# Patient Record
Sex: FEMALE | Race: WHITE | NOT HISPANIC OR LATINO | Employment: FULL TIME | URBAN - METROPOLITAN AREA
[De-identification: names, ages, dates, MRNs, and addresses within clinical notes are randomized per-mention and may not be internally consistent; named-entity substitution may affect disease eponyms.]

---

## 2019-11-05 ENCOUNTER — OFFICE VISIT (OUTPATIENT)
Dept: PAIN MEDICINE | Facility: CLINIC | Age: 54
End: 2019-11-05

## 2019-11-05 ENCOUNTER — RESULTS ENCOUNTER (OUTPATIENT)
Dept: PAIN MEDICINE | Facility: HOSPITAL | Age: 54
End: 2019-11-05

## 2019-11-05 VITALS
WEIGHT: 149 LBS | RESPIRATION RATE: 16 BRPM | BODY MASS INDEX: 22.58 KG/M2 | OXYGEN SATURATION: 100 % | HEART RATE: 60 BPM | HEIGHT: 68 IN | DIASTOLIC BLOOD PRESSURE: 78 MMHG | TEMPERATURE: 98.3 F | SYSTOLIC BLOOD PRESSURE: 125 MMHG

## 2019-11-05 DIAGNOSIS — G43.719 INTRACTABLE CHRONIC MIGRAINE WITHOUT AURA AND WITHOUT STATUS MIGRAINOSUS: Primary | ICD-10-CM

## 2019-11-05 DIAGNOSIS — F19.90 CURRENT DRUG USE: ICD-10-CM

## 2019-11-05 DIAGNOSIS — M25.50 PAIN IN JOINT INVOLVING MULTIPLE SITES: ICD-10-CM

## 2019-11-05 DIAGNOSIS — F19.90 CURRENT DRUG USE: Primary | ICD-10-CM

## 2019-11-05 DIAGNOSIS — M32.9 SYSTEMIC LUPUS ERYTHEMATOSUS, UNSPECIFIED SLE TYPE, UNSPECIFIED ORGAN INVOLVEMENT STATUS (HCC): ICD-10-CM

## 2019-11-05 DIAGNOSIS — R51.9 CHRONIC DAILY HEADACHE: ICD-10-CM

## 2019-11-05 PROCEDURE — G0463 HOSPITAL OUTPT CLINIC VISIT: HCPCS | Performed by: PHYSICAL MEDICINE & REHABILITATION

## 2019-11-05 PROCEDURE — 99204 OFFICE O/P NEW MOD 45 MIN: CPT | Performed by: PHYSICAL MEDICINE & REHABILITATION

## 2019-11-05 RX ORDER — DIPHENHYDRAMINE HCL 25 MG
25 TABLET ORAL NIGHTLY PRN
COMMUNITY
Start: 2017-01-01

## 2019-11-05 RX ORDER — MECLIZINE HYDROCHLORIDE 25 MG/1
25 TABLET ORAL 3 TIMES DAILY PRN
COMMUNITY

## 2019-11-05 RX ORDER — NITROGLYCERIN 0.4 MG/1
TABLET SUBLINGUAL
COMMUNITY
Start: 2010-06-01 | End: 2022-10-28 | Stop reason: SDUPTHER

## 2019-11-05 RX ORDER — FOLIC ACID 0.8 MG
500 TABLET ORAL DAILY
COMMUNITY
Start: 2014-01-01

## 2019-11-05 RX ORDER — TOPIRAMATE 100 MG/1
100 TABLET, FILM COATED ORAL 2 TIMES DAILY
COMMUNITY
Start: 2019-09-18 | End: 2021-03-11 | Stop reason: SDUPTHER

## 2019-11-05 RX ORDER — OMEPRAZOLE 20 MG/1
20 CAPSULE, DELAYED RELEASE ORAL DAILY
COMMUNITY
Start: 2019-10-07 | End: 2021-04-22 | Stop reason: SDUPTHER

## 2019-11-05 RX ORDER — POTASSIUM CITRATE 15 MEQ/1
TABLET, EXTENDED RELEASE ORAL
COMMUNITY
Start: 2018-08-01 | End: 2021-02-17

## 2019-11-05 RX ORDER — SUMATRIPTAN 100 MG/1
100 TABLET, FILM COATED ORAL ONCE AS NEEDED
Qty: 9 TABLET | Refills: 2 | Status: SHIPPED | OUTPATIENT
Start: 2019-11-05 | End: 2020-01-21 | Stop reason: SDUPTHER

## 2019-11-05 RX ORDER — DILTIAZEM HYDROCHLORIDE 360 MG/1
360 CAPSULE, EXTENDED RELEASE ORAL DAILY
COMMUNITY
Start: 2019-10-21 | End: 2023-02-23

## 2019-11-05 RX ORDER — HYDROXYCHLOROQUINE SULFATE 200 MG/1
200 TABLET, FILM COATED ORAL 2 TIMES DAILY
COMMUNITY
Start: 2012-06-01

## 2019-11-05 RX ORDER — ESTRADIOL 0.5 MG/1
0.5 TABLET ORAL DAILY
COMMUNITY
Start: 2019-04-01 | End: 2021-08-20

## 2019-11-05 RX ORDER — LEVOTHYROXINE SODIUM 75 MCG
TABLET ORAL
COMMUNITY
Start: 2019-08-22 | End: 2020-12-31 | Stop reason: SDUPTHER

## 2019-11-05 RX ORDER — HYDROCODONE BITARTRATE AND ACETAMINOPHEN 10; 325 MG/1; MG/1
TABLET ORAL
COMMUNITY
Start: 2012-08-01 | End: 2019-11-05 | Stop reason: SDUPTHER

## 2019-11-05 RX ORDER — SUMATRIPTAN 100 MG/1
TABLET, FILM COATED ORAL
COMMUNITY
Start: 2001-01-01 | End: 2019-11-05 | Stop reason: SDUPTHER

## 2019-11-05 RX ORDER — ONDANSETRON 4 MG/1
TABLET, FILM COATED ORAL
COMMUNITY
Start: 2001-01-01 | End: 2020-07-21 | Stop reason: SDUPTHER

## 2019-11-05 RX ORDER — CYANOCOBALAMIN 1000 UG/ML
INJECTION, SOLUTION INTRAMUSCULAR; SUBCUTANEOUS
COMMUNITY
Start: 2019-10-21 | End: 2021-03-22

## 2019-11-05 RX ORDER — HYDROCODONE BITARTRATE AND ACETAMINOPHEN 10; 325 MG/1; MG/1
1 TABLET ORAL EVERY 6 HOURS PRN
Qty: 28 TABLET | Refills: 0 | Status: SHIPPED | OUTPATIENT
Start: 2019-11-05 | End: 2019-11-21 | Stop reason: SDUPTHER

## 2019-11-05 RX ORDER — CARVEDILOL 3.12 MG/1
3.12 TABLET ORAL 2 TIMES DAILY WITH MEALS
COMMUNITY
Start: 2010-10-10 | End: 2022-12-13

## 2019-11-05 NOTE — PROGRESS NOTES
Subjective   Tasneem Valentino is a 54 y.o. female.     Chronic daily headaches, also widespread joint pain with SLE, 10/10 at worst, 1/10 at best, always present, varies, aching, stabbing, worse with weather changes, interferes with ADLs, sleep, failed chiropractor, PT, massage, occipital neurotomy, headaches stim trial. MRI brain wnl. Prior notes reviewed, as above, was seeing Dr. Laguerre with Norco 10mg QID prn and Imitrex 100mg #9/month with some relief, also started Amovig 70mg, uses Zofran 4mg prn. No FH of substance abuse.         The following portions of the patient's history were reviewed and updated as appropriate: allergies, current medications, past family history, past medical history, past social history, past surgical history and problem list.    Review of Systems   Constitutional: Negative for chills, fatigue and fever.   HENT: Positive for hearing loss. Negative for trouble swallowing.    Eyes: Positive for visual disturbance.   Respiratory: Negative for shortness of breath.    Cardiovascular: Negative for chest pain.   Gastrointestinal: Positive for constipation. Negative for abdominal pain, diarrhea, nausea and vomiting.   Genitourinary: Negative for urinary incontinence.   Musculoskeletal: Negative for arthralgias, back pain, joint swelling, myalgias and neck pain.   Neurological: Positive for dizziness and headache. Negative for weakness and numbness.       Objective   Physical Exam   Constitutional: She is oriented to person, place, and time. She appears well-developed and well-nourished.   HENT:   Head: Normocephalic and atraumatic.   Eyes: EOM are normal. Pupils are equal, round, and reactive to light.   Neck: Normal range of motion.   Cardiovascular: Normal rate, regular rhythm and intact distal pulses.   Murmur heard.  Pulmonary/Chest: Breath sounds normal.   Abdominal: Soft. Bowel sounds are normal. She exhibits no distension. There is no tenderness.   Neurological: She is alert and oriented to  person, place, and time. She has normal strength and normal reflexes. She displays normal reflexes. No sensory deficit.   Psychiatric: She has a normal mood and affect. Her behavior is normal. Thought content normal.         Assessment/Plan   Tasneem was seen today for headache.    Diagnoses and all orders for this visit:    Intractable chronic migraine without aura and without status migrainosus    Chronic daily headache    Systemic lupus erythematosus, unspecified SLE type, unspecified organ involvement status (CMS/HCC)    Pain in joint involving multiple sites        Discussed risks and benefits of opioid treatment for chonic pain with patient, including expectations related to prescription requests, alternative modalities to opioids for managing pain, her treatment plan, risks of dependency and addiction, and safe storage practices for prescribed opioids, as well as proper and improper disposal of all medications.  Will obtain UDS today, pain contract today.  Inspect report reviewed, prior notes reviewed, consistent with patient's stated history.  Treatment plan will consist of continuing current medication as long as it remains effective and is necessary, while evaluating patient at each visit and determining if the medication can be lowered or discontinued, while also using nonopioid therapies to reduce reliance on opioids.  Cont Norco 10mg QID prn, Imitrex 100mg #9.  Failed procedures.  RTC 2-3 months for f/u.

## 2019-11-21 RX ORDER — HYDROCODONE BITARTRATE AND ACETAMINOPHEN 10; 325 MG/1; MG/1
1 TABLET ORAL EVERY 6 HOURS PRN
Qty: 120 TABLET | Refills: 0 | Status: SHIPPED | OUTPATIENT
Start: 2019-11-21 | End: 2020-01-21 | Stop reason: SDUPTHER

## 2019-11-21 RX ORDER — HYDROCODONE BITARTRATE AND ACETAMINOPHEN 10; 325 MG/1; MG/1
1 TABLET ORAL EVERY 6 HOURS PRN
Qty: 120 TABLET | Refills: 0 | Status: SHIPPED | OUTPATIENT
Start: 2019-11-21 | End: 2019-11-21 | Stop reason: SDUPTHER

## 2020-01-21 ENCOUNTER — OFFICE VISIT (OUTPATIENT)
Dept: PAIN MEDICINE | Facility: CLINIC | Age: 55
End: 2020-01-21

## 2020-01-21 VITALS
HEIGHT: 68 IN | HEART RATE: 56 BPM | OXYGEN SATURATION: 100 % | WEIGHT: 144 LBS | DIASTOLIC BLOOD PRESSURE: 85 MMHG | SYSTOLIC BLOOD PRESSURE: 127 MMHG | RESPIRATION RATE: 16 BRPM | TEMPERATURE: 98.3 F | BODY MASS INDEX: 21.82 KG/M2

## 2020-01-21 DIAGNOSIS — M25.50 PAIN IN JOINT INVOLVING MULTIPLE SITES: ICD-10-CM

## 2020-01-21 DIAGNOSIS — G43.719 INTRACTABLE CHRONIC MIGRAINE WITHOUT AURA AND WITHOUT STATUS MIGRAINOSUS: Primary | ICD-10-CM

## 2020-01-21 PROCEDURE — 99213 OFFICE O/P EST LOW 20 MIN: CPT | Performed by: PHYSICAL MEDICINE & REHABILITATION

## 2020-01-21 PROCEDURE — G0463 HOSPITAL OUTPT CLINIC VISIT: HCPCS | Performed by: PHYSICAL MEDICINE & REHABILITATION

## 2020-01-21 RX ORDER — HYDROCODONE BITARTRATE AND ACETAMINOPHEN 10; 325 MG/1; MG/1
1 TABLET ORAL EVERY 6 HOURS PRN
Qty: 120 TABLET | Refills: 0 | Status: SHIPPED | OUTPATIENT
Start: 2020-01-21 | End: 2020-01-21 | Stop reason: SDUPTHER

## 2020-01-21 RX ORDER — SUMATRIPTAN 100 MG/1
100 TABLET, FILM COATED ORAL ONCE AS NEEDED
Qty: 9 TABLET | Refills: 2 | Status: SHIPPED | OUTPATIENT
Start: 2020-01-21 | End: 2020-04-28 | Stop reason: SDUPTHER

## 2020-01-21 RX ORDER — HYDROCODONE BITARTRATE AND ACETAMINOPHEN 10; 325 MG/1; MG/1
1 TABLET ORAL EVERY 6 HOURS PRN
Qty: 120 TABLET | Refills: 0 | Status: SHIPPED | OUTPATIENT
Start: 2020-01-21 | End: 2020-04-28 | Stop reason: SDUPTHER

## 2020-01-21 NOTE — PROGRESS NOTES
Subjective   Tasneem Valentino is a 55 y.o. female.     Chronic daily headaches, also widespread joint pain with SLE, 10/10 at worst, 1/10 at best, always present, varies, aching, stabbing, worse with weather changes, interferes with ADLs, sleep, failed chiropractor, PT, massage, occipital neurotomy, headaches stim trial. MRI brain wnl. Prior notes reviewed, as above, was seeing Dr. Laguerre with Norco 10mg QID prn and Imitrex 100mg #9/month with some relief, also started Amovig 70mg, uses Zofran 4mg prn. No FH of substance abuse.       The following portions of the patient's history were reviewed and updated as appropriate: allergies, current medications, past family history, past medical history, past social history, past surgical history and problem list.    Review of Systems   Constitutional: Negative for chills, fatigue and fever.   HENT: Positive for hearing loss. Negative for trouble swallowing.    Eyes: Positive for visual disturbance.   Respiratory: Negative for shortness of breath.    Cardiovascular: Negative for chest pain.   Gastrointestinal: Positive for constipation. Negative for abdominal pain, diarrhea, nausea and vomiting.   Genitourinary: Negative for urinary incontinence.   Musculoskeletal: Negative for arthralgias, back pain, joint swelling, myalgias and neck pain.   Neurological: Positive for dizziness and headache. Negative for weakness and numbness.       Objective   Physical Exam   Constitutional: She is oriented to person, place, and time. She appears well-developed and well-nourished.   HENT:   Head: Normocephalic and atraumatic.   Eyes: Pupils are equal, round, and reactive to light. EOM are normal.   Neck: Normal range of motion.   Cardiovascular: Normal rate, regular rhythm and intact distal pulses.   Murmur heard.  Pulmonary/Chest: Breath sounds normal.   Abdominal: Soft. Bowel sounds are normal. She exhibits no distension. There is no tenderness.   Neurological: She is alert and oriented to  person, place, and time. She has normal strength and normal reflexes. She displays normal reflexes. No sensory deficit.   Psychiatric: She has a normal mood and affect. Her behavior is normal. Thought content normal.         Assessment/Plan   Tasneem was seen today for migraine.    Diagnoses and all orders for this visit:    Intractable chronic migraine without aura and without status migrainosus    Pain in joint involving multiple sites        UDS in order 11/5/19. Inspect reviewed, in order.  Treatment plan will consist of continuing current medication as long as it remains effective and is necessary, while evaluating patient at each visit and determining if the medication can be lowered or discontinued, while also using nonopioid therapies to reduce reliance on opioids.  Cont Norco 10mg QID prn, Imitrex 100mg #9.  Failed procedures.  RTC 3 months for f/u.

## 2020-04-28 ENCOUNTER — TELEMEDICINE (OUTPATIENT)
Dept: PAIN MEDICINE | Facility: CLINIC | Age: 55
End: 2020-04-28

## 2020-04-28 DIAGNOSIS — R51.9 CHRONIC DAILY HEADACHE: Primary | ICD-10-CM

## 2020-04-28 DIAGNOSIS — M25.50 PAIN IN JOINT INVOLVING MULTIPLE SITES: ICD-10-CM

## 2020-04-28 PROCEDURE — 99213 OFFICE O/P EST LOW 20 MIN: CPT | Performed by: PHYSICAL MEDICINE & REHABILITATION

## 2020-04-28 RX ORDER — SUMATRIPTAN 100 MG/1
100 TABLET, FILM COATED ORAL ONCE AS NEEDED
Qty: 9 TABLET | Refills: 2 | Status: SHIPPED | OUTPATIENT
Start: 2020-04-28 | End: 2020-07-21 | Stop reason: SDUPTHER

## 2020-04-28 RX ORDER — HYDROCODONE BITARTRATE AND ACETAMINOPHEN 10; 325 MG/1; MG/1
1 TABLET ORAL EVERY 6 HOURS PRN
Qty: 120 TABLET | Refills: 0 | Status: SHIPPED | OUTPATIENT
Start: 2020-04-28 | End: 2020-06-19 | Stop reason: SDUPTHER

## 2020-04-28 NOTE — PROGRESS NOTES
Subjective   Tasneem Valentino is a 55 y.o. female.     Chronic daily headaches, also widespread joint pain with SLE, 10/10 at worst, 1/10 at best, always present, varies, aching, stabbing, worse with weather changes, interferes with ADLs, sleep, failed chiropractor, PT, massage, occipital neurotomy, headaches stim trial. MRI brain wnl. Prior notes reviewed, as above, was seeing Dr. Laguerre with Norco 10mg QID prn and Imitrex 100mg #9/month with some relief, also started Amovig 70mg, uses Zofran 4mg prn. No FH of substance abuse.       The following portions of the patient's history were reviewed and updated as appropriate: allergies, current medications, past family history, past medical history, past social history, past surgical history and problem list.    Review of Systems   Constitutional: Negative for chills, fatigue and fever.   HENT: Positive for hearing loss. Negative for trouble swallowing.    Eyes: Positive for visual disturbance.   Respiratory: Negative for shortness of breath.    Cardiovascular: Negative for chest pain.   Gastrointestinal: Positive for constipation. Negative for abdominal pain, diarrhea, nausea and vomiting.   Genitourinary: Negative for urinary incontinence.   Musculoskeletal: Negative for arthralgias, back pain, joint swelling, myalgias and neck pain.   Neurological: Positive for dizziness and headache. Negative for weakness and numbness.       Objective   Physical Exam   Constitutional: She is oriented to person, place, and time. She appears well-developed and well-nourished.   Pulmonary/Chest: Effort normal.   Neurological: She is alert and oriented to person, place, and time. She has normal strength and normal reflexes.   Psychiatric: She has a normal mood and affect. Her behavior is normal. Thought content normal.         Assessment/Plan   Diagnoses and all orders for this visit:    Chronic daily headache    Pain in joint involving multiple sites        UDS in order 11/5/19. Inspect  reviewed, in order.  Treatment plan will consist of continuing current medication as long as it remains effective and is necessary, while evaluating patient at each visit and determining if the medication can be lowered or discontinued, while also using nonopioid therapies to reduce reliance on opioids.  Cont Norco 10mg QID prn, Imitrex 100mg #9.  Failed procedures.  RTC 3 months for f/u. Video visit.

## 2020-06-19 DIAGNOSIS — M25.50 PAIN IN JOINT INVOLVING MULTIPLE SITES: ICD-10-CM

## 2020-06-19 RX ORDER — HYDROCODONE BITARTRATE AND ACETAMINOPHEN 10; 325 MG/1; MG/1
1 TABLET ORAL EVERY 6 HOURS PRN
Qty: 120 TABLET | Refills: 0 | Status: SHIPPED | OUTPATIENT
Start: 2020-06-19 | End: 2020-06-25 | Stop reason: SDUPTHER

## 2020-06-22 PROBLEM — G43.909 MIGRAINE: Status: ACTIVE | Noted: 2020-06-22

## 2020-06-22 PROBLEM — I10 BENIGN ESSENTIAL HYPERTENSION: Status: ACTIVE | Noted: 2017-07-12

## 2020-06-22 PROBLEM — E03.9 HYPOTHYROIDISM: Status: ACTIVE | Noted: 2017-07-12

## 2020-06-22 PROBLEM — F41.9 ANXIETY: Status: ACTIVE | Noted: 2020-06-22

## 2020-06-22 PROBLEM — J02.9 VIRAL PHARYNGITIS: Status: ACTIVE | Noted: 2020-06-22

## 2020-06-22 PROBLEM — F32.A DEPRESSIVE DISORDER: Status: ACTIVE | Noted: 2017-07-12

## 2020-06-22 PROBLEM — I35.1 AORTIC INSUFFICIENCY: Status: ACTIVE | Noted: 2019-05-30

## 2020-06-22 PROBLEM — D64.9 ANEMIA: Status: ACTIVE | Noted: 2020-06-22

## 2020-06-23 ENCOUNTER — OFFICE VISIT (OUTPATIENT)
Dept: FAMILY MEDICINE CLINIC | Facility: CLINIC | Age: 55
End: 2020-06-23

## 2020-06-23 VITALS
OXYGEN SATURATION: 99 % | TEMPERATURE: 99.3 F | DIASTOLIC BLOOD PRESSURE: 71 MMHG | SYSTOLIC BLOOD PRESSURE: 108 MMHG | HEART RATE: 56 BPM | WEIGHT: 136 LBS | BODY MASS INDEX: 20.61 KG/M2 | HEIGHT: 68 IN

## 2020-06-23 DIAGNOSIS — R29.898 WEAKNESS OF RIGHT UPPER EXTREMITY: ICD-10-CM

## 2020-06-23 DIAGNOSIS — R20.0 NUMBNESS: ICD-10-CM

## 2020-06-23 DIAGNOSIS — E55.9 VITAMIN D DEFICIENCY: ICD-10-CM

## 2020-06-23 DIAGNOSIS — I10 BENIGN ESSENTIAL HYPERTENSION: Primary | ICD-10-CM

## 2020-06-23 DIAGNOSIS — D51.9 ANEMIA DUE TO VITAMIN B12 DEFICIENCY, UNSPECIFIED B12 DEFICIENCY TYPE: ICD-10-CM

## 2020-06-23 DIAGNOSIS — E03.9 ACQUIRED HYPOTHYROIDISM: ICD-10-CM

## 2020-06-23 DIAGNOSIS — I10 ESSENTIAL HYPERTENSION: ICD-10-CM

## 2020-06-23 DIAGNOSIS — E53.8 VITAMIN B 12 DEFICIENCY: ICD-10-CM

## 2020-06-23 DIAGNOSIS — M79.601 PAIN OF RIGHT UPPER EXTREMITY: ICD-10-CM

## 2020-06-23 DIAGNOSIS — M25.521 RIGHT ELBOW PAIN: ICD-10-CM

## 2020-06-23 DIAGNOSIS — M32.9 SYSTEMIC LUPUS ERYTHEMATOSUS, UNSPECIFIED SLE TYPE, UNSPECIFIED ORGAN INVOLVEMENT STATUS (HCC): ICD-10-CM

## 2020-06-23 DIAGNOSIS — R22.32 NODULE OF SKIN OF LEFT UPPER EXTREMITY: ICD-10-CM

## 2020-06-23 PROBLEM — Z87.442 HISTORY OF RENAL STONE: Status: ACTIVE | Noted: 2020-06-23

## 2020-06-23 PROBLEM — Z86.19 HISTORY OF LYME DISEASE: Status: ACTIVE | Noted: 2020-06-23

## 2020-06-23 PROBLEM — N18.2 KIDNEY DISEASE, CHRONIC, STAGE II (GFR 60-89 ML/MIN): Status: ACTIVE | Noted: 2020-06-23

## 2020-06-23 PROCEDURE — 99204 OFFICE O/P NEW MOD 45 MIN: CPT | Performed by: FAMILY MEDICINE

## 2020-06-23 RX ORDER — UBIDECARENONE 100 MG
100 CAPSULE ORAL DAILY
COMMUNITY
End: 2022-04-08

## 2020-06-23 NOTE — PROGRESS NOTES
Chief Complaint   Patient presents with   • weakness   • right arm pain       Subjective     Tasneem Valentino is a 55 y.o. female.  Patient presents as a new patient to myself and this practice to establish care.  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   Biggest concern today is weakness in her right arm that started on May 22 as well as a knots on right arm that has gotten somewhat painful over the last couple of years and 2 knots on left arm that have just recently come up.  Patient has history of lupus, does see rheumatology at [facility] in [place].  Did have some labs a couple of months ago but do not know any specifics on what was ordered.    Patient has history of stage II to stage III chronic renal insufficiency, followed by Dr. Good since August 2018.  Per his recommendation she is to avoid ACE inhibitors and arms and diuretics if possible.  January he did diagnosed her with vitamin D deficiency and started her on cholecalciferol 50,000 units monthly.  She states she did have some tingling in her left fingertips but have improved since starting the vitamin D.  Additionally she has a history of kidney stones and he started her on potassium citrate to help reduce stone formation.  She states on June 18, she had 1 of these tablets lodged in her throat and had to go to [facility] to have an EGD to remove the tablet.  She denies ongoing dysphasia or foreign body sensation but does report she has had a little more cough since this time.  She is not currently taking the potassium but does have a call in to Dr. Good for direction.    Patient has a history of migraine headaches, she had seen a neurologist in [place] but not for several years.  She reports having an occipital neurotomy 10 years ago.  She was started on Ajovi by her previous primary care last year, she has continue Topamax.  And is also seeing pain management, currently   Supriya, and prescribed hydrocodone 4 times daily.    Previously she has been diagnosed with anemia and has been on B12 injections for a few years.    The following portions of the patient's history were reviewed and updated as appropriate: allergies, current medications, past family history, past medical history, past social history, past surgical history and problem list.    Current Outpatient Medications on File Prior to Visit   Medication Sig   • carvedilol (COREG) 3.125 MG tablet carvedilol 3.125 mg tablet   • Cetirizine HCl 10 MG capsule    • coenzyme Q10 100 MG capsule Take 100 mg by mouth Daily.   • cyanocobalamin 1000 MCG/ML injection    • dilTIAZem CD (CARDIZEM CD) 360 MG 24 hr capsule    • diphenhydrAMINE (BENADRYL ALLERGY) 25 MG tablet    • Erenumab-aooe (AIMOVIG) 70 MG/ML prefilled syringe    • estradiol (ESTRACE) 0.5 MG tablet estradiol 0.5 mg tablet   • HYDROcodone-acetaminophen (NORCO)  MG per tablet Take 1 tablet by mouth Every 6 (Six) Hours As Needed for Moderate Pain  for up to 30 days.   • hydroxychloroquine (PLAQUENIL) 200 MG tablet hydroxychloroquine 200 mg tablet   • Magnesium 500 MG capsule    • meclizine (ANTIVERT) 25 MG tablet Every 8 (Eight) Hours.   • Melatonin 5 MG capsule Take  by mouth.   • nitroglycerin (NITROSTAT) 0.4 MG SL tablet Nitrostat 0.4 mg sublingual tablet   TAKE AS DIRECTED   • omeprazole (priLOSEC) 20 MG capsule    • ondansetron (ZOFRAN) 4 MG tablet ondansetron HCl 4 mg tablet   • progesterone (PROMETRIUM) 100 MG capsule progesterone micronized 100 mg capsule   • sertraline (ZOLOFT) 50 MG tablet sertraline 50 mg tablet   TAKE 1/2 TABLET BY MOUTH IN MORNING AND ONE TABLET BY MOUTH AT BEDTIME.   • SUMAtriptan (IMITREX) 100 MG tablet Take 1 tablet by mouth 1 (One) Time As Needed for Migraine for up to 1 dose. At onset of headache. Repeat one time in 2 hours if needed.   • SYNTHROID 75 MCG tablet    • topiramate (TOPAMAX) 100 MG tablet    • Potassium Citrate ER 15 MEQ  "(1620 MG) tablet controlled-release potassium citrate ER 15 mEq (1,620 mg) tablet,extended release     No current facility-administered medications on file prior to visit.      There are no discontinued medications.    Review of Systems   Constitutional: Negative for appetite change, fatigue, fever and unexpected weight loss.   HENT: Positive for hearing loss ( Bilateral hearing aids) and tinnitus. Negative for congestion.    Eyes: Negative for visual disturbance.   Respiratory: Negative for cough, shortness of breath and wheezing.    Cardiovascular: Positive for chest pain. Negative for palpitations and leg swelling.   Gastrointestinal: Negative for abdominal pain, constipation, diarrhea, nausea, vomiting and indigestion.   Musculoskeletal: Positive for arthralgias. Negative for neck pain ( Mild stiffness on the left occasionally).   Skin: Positive for skin lesions. Negative for rash.   Neurological: Positive for weakness, numbness and headache.   Psychiatric/Behavioral: Negative for sleep disturbance and depressed mood.    cervical spine x-ray does show some mild anterior listhesis at C4 on C5 and C5 on C6, as well as some mild arthritic type bone spur formation.  Right elbow x-ray is negative  Objective   Vitals:    06/23/20 1451   BP: 108/71   BP Location: Left arm   Patient Position: Sitting   Cuff Size: Adult   Pulse: 56   Temp: 99.3 °F (37.4 °C)   TempSrc: Infrared   SpO2: 99%   Weight: 61.7 kg (136 lb)   Height: 172.7 cm (68\")      Body mass index is 20.68 kg/m².    Physical Exam   Constitutional: She is oriented to person, place, and time. She appears well-developed and well-nourished. No distress.   Eyes: Pupils are equal, round, and reactive to light. Conjunctivae and EOM are normal.   Neck: Normal range of motion.   Cardiovascular: Normal rate and regular rhythm.   No murmur heard.  Pulmonary/Chest: Effort normal. She has no wheezes.   Musculoskeletal: Normal range of motion. She exhibits no edema. "   Normal range of motion of both upper extremities and fingers wrists elbows and shoulders  Patient has minimally decreased 4+ out of 5 grasp strength, finger strength, wrist strength and upper arm strength on the right compared with 5 out of 5 on the left.  Negative Tinel's, negative Phalen's negative Finkelstein's bilaterally.   Neurological: She is alert and oriented to person, place, and time.   Skin: Skin is warm and dry.   There is a subcentimeter mobile firm minimally tender round nodule just above the right posterior elbow.  There are 2 smaller nontender lesions 1 in the left forearm and one in the left upper arm.  These are most consistent with lipomas.   Psychiatric: She has a normal mood and affect.   Nursing note and vitals reviewed.          No results found for: HGBA1C     Procedures     Assessment/Plan   Diagnoses and all orders for this visit:    1. Benign essential hypertension (Primary)  -     Comprehensive Metabolic Panel  -     CBC & Differential    2. Essential hypertension    3. Systemic lupus erythematosus, unspecified SLE type, unspecified organ involvement status (CMS/[facility], located within [facility])  -     Sedimentation Rate    4. Acquired hypothyroidism  -     TSH  -     T4, free    5. Vitamin D deficiency  -     cholecalciferol (VITAMIN D3) 1.25 MG (16468 UT) capsule; Monthly (prescribed by Dr Rojo)  Dispense: 12 capsule; Refill: 3  -     Vitamin D 25 Hydroxy    6. Numbness  -     XR Elbow 2 View Right (In Office)    7. Weakness of right upper extremity  -     XR Elbow 2 View Right (In Office)  -     Ambulatory Referral to Neurology  -     XR Spine Cervical 2 or 3 View    8. Nodule of skin of left upper extremity  -     XR Elbow 2 View Right (In Office)    9. Vitamin B 12 deficiency    10. Anemia due to vitamin B12 deficiency, unspecified B12 deficiency type  -     Vitamin B12    11. Right elbow pain  -     XR Elbow 2 View Right (In Office)    12. Pain of right upper extremity  -     Ambulatory Referral to  Neurology  -     XR Spine Cervical 2 or 3 View    Right arm pain and weakness, and subcutaneous nodules with history of lupus, vitamin D and B12 deficiencies.  Labs as ordered, cervical spondylolisthesis is not likely of clinical significance but if no other cause of arm weakness and pain is identified would recommend cervical MRI to make sure there is not any nerve impingement.  referral to neurology for further evaluation including nerve conduction testing      There are no discontinued medications.       Return if symptoms worsen or fail to improve.        AVS given

## 2020-06-24 LAB
25(OH)D3+25(OH)D2 SERPL-MCNC: 21.9 NG/ML (ref 30–100)
ALBUMIN SERPL-MCNC: 4.4 G/DL (ref 3.8–4.9)
ALBUMIN/GLOB SERPL: 1.7 {RATIO} (ref 1.2–2.2)
ALP SERPL-CCNC: 75 IU/L (ref 39–117)
ALT SERPL-CCNC: 9 IU/L (ref 0–32)
AST SERPL-CCNC: 15 IU/L (ref 0–40)
BASOPHILS # BLD AUTO: 0 X10E3/UL (ref 0–0.2)
BASOPHILS NFR BLD AUTO: 1 %
BILIRUB SERPL-MCNC: <0.2 MG/DL (ref 0–1.2)
BUN SERPL-MCNC: 15 MG/DL (ref 6–24)
BUN/CREAT SERPL: 13 (ref 9–23)
CALCIUM SERPL-MCNC: 9.2 MG/DL (ref 8.7–10.2)
CHLORIDE SERPL-SCNC: 108 MMOL/L (ref 96–106)
CO2 SERPL-SCNC: 23 MMOL/L (ref 20–29)
CREAT SERPL-MCNC: 1.16 MG/DL (ref 0.57–1)
EOSINOPHIL # BLD AUTO: 0.1 X10E3/UL (ref 0–0.4)
EOSINOPHIL NFR BLD AUTO: 2 %
ERYTHROCYTE [DISTWIDTH] IN BLOOD BY AUTOMATED COUNT: 14.2 % (ref 11.7–15.4)
ERYTHROCYTE [SEDIMENTATION RATE] IN BLOOD BY WESTERGREN METHOD: 2 MM/HR (ref 0–40)
GLOBULIN SER CALC-MCNC: 2.6 G/DL (ref 1.5–4.5)
GLUCOSE SERPL-MCNC: 98 MG/DL (ref 65–99)
HCT VFR BLD AUTO: 35.9 % (ref 34–46.6)
HGB BLD-MCNC: 11.5 G/DL (ref 11.1–15.9)
IMM GRANULOCYTES # BLD AUTO: 0 X10E3/UL (ref 0–0.1)
IMM GRANULOCYTES NFR BLD AUTO: 1 %
LYMPHOCYTES # BLD AUTO: 1.3 X10E3/UL (ref 0.7–3.1)
LYMPHOCYTES NFR BLD AUTO: 25 %
MCH RBC QN AUTO: 29.4 PG (ref 26.6–33)
MCHC RBC AUTO-ENTMCNC: 32 G/DL (ref 31.5–35.7)
MCV RBC AUTO: 92 FL (ref 79–97)
MONOCYTES # BLD AUTO: 0.5 X10E3/UL (ref 0.1–0.9)
MONOCYTES NFR BLD AUTO: 10 %
NEUTROPHILS # BLD AUTO: 3.2 X10E3/UL (ref 1.4–7)
NEUTROPHILS NFR BLD AUTO: 61 %
PLATELET # BLD AUTO: 192 X10E3/UL (ref 150–450)
POTASSIUM SERPL-SCNC: 4.2 MMOL/L (ref 3.5–5.2)
PROT SERPL-MCNC: 7 G/DL (ref 6–8.5)
RBC # BLD AUTO: 3.91 X10E6/UL (ref 3.77–5.28)
SODIUM SERPL-SCNC: 141 MMOL/L (ref 134–144)
T4 FREE SERPL-MCNC: 1.19 NG/DL (ref 0.82–1.77)
TSH SERPL DL<=0.005 MIU/L-ACNC: 2.3 UIU/ML (ref 0.45–4.5)
VIT B12 SERPL-MCNC: 1427 PG/ML (ref 232–1245)
WBC # BLD AUTO: 5.2 X10E3/UL (ref 3.4–10.8)

## 2020-06-25 ENCOUNTER — TELEPHONE (OUTPATIENT)
Dept: PAIN MEDICINE | Facility: CLINIC | Age: 55
End: 2020-06-25

## 2020-06-25 DIAGNOSIS — M25.50 PAIN IN JOINT INVOLVING MULTIPLE SITES: ICD-10-CM

## 2020-06-25 RX ORDER — HYDROCODONE BITARTRATE AND ACETAMINOPHEN 10; 325 MG/1; MG/1
1 TABLET ORAL EVERY 6 HOURS PRN
Qty: 120 TABLET | Refills: 0 | Status: SHIPPED | OUTPATIENT
Start: 2020-06-25 | End: 2020-07-21 | Stop reason: SDUPTHER

## 2020-06-25 NOTE — TELEPHONE ENCOUNTER
[facility] pharmacy ordered her Hydrocodone last Friday they will not have any until next week and she is out of medication. Could you order it again and send it to [facility] in [place]? I will put her Inspect on your desk for review.

## 2020-06-26 NOTE — PROGRESS NOTES
Please inform patient thyroid testing and sed rate and CBC are normal.  Vitamin D is still very low increase cholecalciferol/vitamin D 50,000 units to once weekly for the next 8 weeks and then continue every other week. (Originally prescribed by Dr. Good may send prescription if she does not have adequate supply or refills).  Renal function shows a GFR of 54 this is the higher end of stage III chronic renal insufficiency.  If she would like these labs can be sent to Dr. Good, her nephrologist, for his review.  Vitamin B12 level is above therapeutic level, please ask when her last B12 shot was in relation to the lab draw if it was within the first week this may not be a problem if she is due for another injection this may need to be postponed, may be able to discontinue vitamin B12, reduce dosing quantity or frequency.

## 2020-07-17 DIAGNOSIS — E55.9 VITAMIN D DEFICIENCY: ICD-10-CM

## 2020-07-21 ENCOUNTER — OFFICE VISIT (OUTPATIENT)
Dept: PAIN MEDICINE | Facility: CLINIC | Age: 55
End: 2020-07-21

## 2020-07-21 VITALS
WEIGHT: 135 LBS | SYSTOLIC BLOOD PRESSURE: 123 MMHG | BODY MASS INDEX: 20.46 KG/M2 | DIASTOLIC BLOOD PRESSURE: 75 MMHG | HEIGHT: 68 IN | RESPIRATION RATE: 16 BRPM | HEART RATE: 57 BPM | TEMPERATURE: 97.7 F | OXYGEN SATURATION: 98 %

## 2020-07-21 DIAGNOSIS — M25.50 PAIN IN JOINT INVOLVING MULTIPLE SITES: Primary | ICD-10-CM

## 2020-07-21 DIAGNOSIS — R51.9 CHRONIC DAILY HEADACHE: ICD-10-CM

## 2020-07-21 PROBLEM — R11.0 NAUSEA: Status: ACTIVE | Noted: 2020-07-21

## 2020-07-21 PROCEDURE — 99214 OFFICE O/P EST MOD 30 MIN: CPT | Performed by: PHYSICAL MEDICINE & REHABILITATION

## 2020-07-21 PROCEDURE — G0463 HOSPITAL OUTPT CLINIC VISIT: HCPCS | Performed by: PHYSICAL MEDICINE & REHABILITATION

## 2020-07-21 RX ORDER — HYDROCODONE BITARTRATE AND ACETAMINOPHEN 10; 325 MG/1; MG/1
1 TABLET ORAL EVERY 6 HOURS PRN
Qty: 120 TABLET | Refills: 0 | Status: SHIPPED | OUTPATIENT
Start: 2020-07-21 | End: 2020-07-21 | Stop reason: SDUPTHER

## 2020-07-21 RX ORDER — SUMATRIPTAN 100 MG/1
100 TABLET, FILM COATED ORAL ONCE AS NEEDED
Qty: 9 TABLET | Refills: 2 | Status: SHIPPED | OUTPATIENT
Start: 2020-07-21 | End: 2021-02-02 | Stop reason: SDUPTHER

## 2020-07-21 RX ORDER — HYDROCODONE BITARTRATE AND ACETAMINOPHEN 10; 325 MG/1; MG/1
1 TABLET ORAL EVERY 6 HOURS PRN
Qty: 120 TABLET | Refills: 0 | Status: SHIPPED | OUTPATIENT
Start: 2020-07-21 | End: 2020-08-20

## 2020-07-21 RX ORDER — ONDANSETRON 4 MG/1
4 TABLET, FILM COATED ORAL EVERY 8 HOURS PRN
Qty: 90 TABLET | Refills: 2 | Status: SHIPPED | OUTPATIENT
Start: 2020-07-21 | End: 2021-08-09 | Stop reason: SDUPTHER

## 2020-07-21 NOTE — PROGRESS NOTES
Subjective   Tasneem Valentino is a 55 y.o. female.     Chronic daily headaches, also widespread joint pain with SLE, 10/10 at worst, 1/10 at best, always present, varies, aching, stabbing, worse with weather changes, interferes with ADLs, sleep, failed chiropractor, PT, massage, occipital neurotomy, headaches stim trial. MRI brain wnl. Prior notes reviewed, as above, was seeing Dr. Laguerre with Norco 10mg QID prn and Imitrex 100mg #9/month with some relief, also started Amovig 70mg, uses Zofran 4mg prn. No FH of substance abuse. Worsening pain and weakness in RUE, X-ray with listhesis with PCP, upcoming MRI.       The following portions of the patient's history were reviewed and updated as appropriate: allergies, current medications, past family history, past medical history, past social history, past surgical history and problem list.    Review of Systems   Constitutional: Negative for chills, fatigue and fever.   HENT: Positive for hearing loss. Negative for trouble swallowing.    Eyes: Positive for visual disturbance.   Respiratory: Negative for shortness of breath.    Cardiovascular: Negative for chest pain.   Gastrointestinal: Positive for constipation. Negative for abdominal pain, diarrhea, nausea and vomiting.   Genitourinary: Negative for urinary incontinence.   Musculoskeletal: Negative for arthralgias, back pain, joint swelling, myalgias and neck pain.   Neurological: Positive for dizziness and headache. Negative for weakness and numbness.       Objective   Physical Exam   Constitutional: She is oriented to person, place, and time. She appears well-developed and well-nourished.   HENT:   Head: Normocephalic and atraumatic.   Eyes: Pupils are equal, round, and reactive to light. EOM are normal.   Neck: Normal range of motion.   Cardiovascular: Normal rate, regular rhythm and intact distal pulses.   Murmur heard.  Pulmonary/Chest: Breath sounds normal.   Abdominal: Soft. Bowel sounds are normal. She exhibits no  distension. There is no tenderness.   Neurological: She is alert and oriented to person, place, and time. She has normal strength and normal reflexes. She displays normal reflexes. No sensory deficit.   Psychiatric: She has a normal mood and affect. Her behavior is normal. Thought content normal.         Assessment/Plan   Tasneem was seen today for migraine.    Diagnoses and all orders for this visit:    Pain in joint involving multiple sites    Chronic daily headache        UDS in order 11/5/19. Inspect reviewed, in order.  Treatment plan will consist of continuing current medication as long as it remains effective and is necessary, while evaluating patient at each visit and determining if the medication can be lowered or discontinued, while also using nonopioid therapies to reduce reliance on opioids.  Cont Norco 10mg QID prn, Imitrex 100mg #9.  Begin zofran 4mg TID prn.  May need right cervical ESIs pending MRI.  Failed headache procedures.  RTC 3 months for f/u.

## 2020-08-05 ENCOUNTER — TELEPHONE (OUTPATIENT)
Dept: PAIN MEDICINE | Facility: HOSPITAL | Age: 55
End: 2020-08-05

## 2020-08-05 NOTE — TELEPHONE ENCOUNTER
Patient called and would like the results of her MRI and possibly some injections. MRI faxed to the [place] office for you to look at

## 2020-08-06 NOTE — TELEPHONE ENCOUNTER
PER TOMI SHELTON @ [facility] NO AUTH REQUIRED FOR [facility] CALL REF [id number] TOMI SHELTON 08/06/2020 1029

## 2020-09-01 ENCOUNTER — HOSPITAL ENCOUNTER (OUTPATIENT)
Dept: PAIN MEDICINE | Facility: HOSPITAL | Age: 55
Discharge: HOME | End: 2020-09-01

## 2020-09-01 ENCOUNTER — HOSPITAL ENCOUNTER (OUTPATIENT)
Dept: PAIN MEDICINE | Facility: HOSPITAL | Age: 55
Discharge: HOME | End: 2020-09-01
Admitting: PHYSICAL MEDICINE & REHABILITATION

## 2020-09-01 VITALS
SYSTOLIC BLOOD PRESSURE: 126 MMHG | HEART RATE: 53 BPM | RESPIRATION RATE: 16 BRPM | DIASTOLIC BLOOD PRESSURE: 78 MMHG | TEMPERATURE: 98.3 F | BODY MASS INDEX: 20.31 KG/M2 | OXYGEN SATURATION: 100 % | HEIGHT: 68 IN | WEIGHT: 134 LBS

## 2020-09-01 DIAGNOSIS — M54.12 CERVICAL RADICULOPATHY: Primary | ICD-10-CM

## 2020-09-01 DIAGNOSIS — R52 PAIN: ICD-10-CM

## 2020-09-01 DIAGNOSIS — M47.812 CERVICAL SPONDYLOSIS WITHOUT MYELOPATHY: ICD-10-CM

## 2020-09-01 DIAGNOSIS — M54.2 CERVICALGIA: ICD-10-CM

## 2020-09-01 DIAGNOSIS — M48.02 NEUROFORAMINAL STENOSIS OF CERVICAL SPINE: ICD-10-CM

## 2020-09-01 PROCEDURE — 25010000002 DEXAMETHASONE SODIUM PHOSPHATE 10 MG/ML SOLUTION

## 2020-09-01 PROCEDURE — 77003 FLUOROGUIDE FOR SPINE INJECT: CPT

## 2020-09-01 PROCEDURE — 62321 NJX INTERLAMINAR CRV/THRC: CPT | Performed by: PHYSICAL MEDICINE & REHABILITATION

## 2020-09-01 RX ORDER — DEXAMETHASONE SODIUM PHOSPHATE 10 MG/ML
INJECTION, SOLUTION INTRAMUSCULAR; INTRAVENOUS
Status: DISCONTINUED
Start: 2020-09-01 | End: 2020-09-02 | Stop reason: HOSPADM

## 2020-09-01 RX ORDER — DEXAMETHASONE SODIUM PHOSPHATE 10 MG/ML
10 INJECTION, SOLUTION INTRAMUSCULAR; INTRAVENOUS ONCE
Status: COMPLETED | OUTPATIENT
Start: 2020-09-01 | End: 2020-09-01

## 2020-09-01 RX ADMIN — DEXAMETHASONE SODIUM PHOSPHATE 10 MG: 10 INJECTION, SOLUTION INTRAMUSCULAR; INTRAVENOUS at 10:36

## 2020-09-01 NOTE — H&P
Patient Care Team:  Ema Moore MD as PCP - General (Family Medicine)  Shanita Rodriguez as Technologist    Chief complaint Headaches    Subjective     Chronic daily headaches, also widespread joint pain with SLE, 10/10 at worst, 1/10 at best, always present, varies, aching, stabbing, worse with weather changes, interferes with ADLs, sleep, failed chiropractor, PT, massage, occipital neurotomy, headaches stim trial. MRI brain wnl. Prior notes reviewed, as above, was seeing Dr. Laguerre with Norco 10mg QID prn and Imitrex 100mg #9/month with some relief, also started Amovig 70mg, uses Zofran 4mg prn. No FH of substance abuse. 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 Here for 1st of 3 cervical ESIs. Denies anticoagulation, current infection or ABX, does have an allergy to IV contrast.      Review of Systems     Past Medical History:   Diagnosis Date   • Headache    • Heart disease    • Hypertension    • Kidney disease    • Lupus (CMS/HCC)    • Thyroid disease      Past Surgical History:   Procedure Laterality Date   • APPENDECTOMY     • CARDIAC CATHETERIZATION     • CHOLECYSTECTOMY     • OCCIPITAL NEURECTOMY  2010   • OVARY SURGERY       Family History   Problem Relation Age of Onset   • Hypertension Mother    • Cancer Father    • Arthritis Sister    • Heart disease Brother    • No Known Problems Brother    • No Known Problems Brother      Social History     Tobacco Use   • Smoking status: Never Smoker   • Smokeless tobacco: Never Used   Substance Use Topics   • Alcohol use: No     Frequency: Never   • Drug use: Never       (Not in a hospital admission)  Allergies:  Contrast dye; Iodinated diagnostic agents; Sulfa antibiotics; Tizanidine; and Zanaflex  [tizanidine hcl]    Objective      Vital Signs  Temp:  [98.3 °F (36.8 °C)] 98.3 °F (36.8 °C)  Heart Rate:  [57] 57  Resp:  [16] 16  BP: (122)/(78)  122/78    Physical Exam    Results Review:   None      Assessment/Plan       * No active hospital problems. *      ICD-10-CM ICD-9-CM   1. Cervical radiculopathy M54.12 723.4   2. Neuroforaminal stenosis of cervical spine M99.81 723.0   3. Cervical spondylosis without myelopathy M47.812 721.0   4. Cervicalgia M54.2 723.1         Assessment & Plan    I discussed the patients findings and my recommendations with patient     UDS in order 11/5/19. Inspect reviewed, in order.  Treatment plan will consist of continuing current medication as long as it remains effective and is necessary, while evaluating patient at each visit and determining if the medication can be lowered or discontinued, while also using nonopioid therapies to reduce reliance on opioids.  Cont Norco 10mg QID prn, Imitrex 100mg #9.  Began zofran 4mg TID prn.  1st of 3 right cervical ESIs per MRI. No contrast per allergy.  Failed headache procedures.  RTC 3 months for f/u.      Cervical Epidural Steroid Injection    PREOPERATIVE DIAGNOSIS: Cervical spinal stenosis    POSTOPERATIVE DIAGNOSIS: Cervical spinal stenosis    PROCEDURE PERFORMED: Cervical Epidural Steroid Injection    The patient presents with a history of  [ cervical ] degenerative disc disease. The patient presents today for a [ cervical ]  epidural steroid injection at level right C7-T1. The patient understands the risks and benefits of the procedure and wishes to proceed.  The patient was seen in the preoperative area.  Patient's consent was obtained and updated.  Vitals were taken.  Patient was then brought to the procedure suite and placed in a prone position. The appropriate anatomic area was widely prepped with Chloraprep and draped in a sterile fashion. Under fluoroscopic guidance using AP view a 20 gauge styleted tuohy needle was passed through skin anesthetized with 1% Lidocaine without epinephrine.  The needle was advanced using the continuous loss of resistance technique into the  C7-T1 epidural space after first advancing to the T1 lamina, then redirecting in a superior and medial fashion. Needle tip placement in the epidural space was confirmed by loss of resistance. Preservative free contrast not used due to allergy.  Following this [ 3 ] mL of a solution containing [ Dexamethasone 10mg and saline 2ml ] was carefully administered in the epidural space.A sterile dressing was placed over the puncture site.    The patient tolerated the procedure with [ no complications ]. They were then brought to the post procedure area where they recovered nicely.       Abdirahman Epps MD  09/01/20  10:17    Time: Discharge 15 min

## 2020-09-29 ENCOUNTER — APPOINTMENT (OUTPATIENT)
Dept: PAIN MEDICINE | Facility: HOSPITAL | Age: 55
End: 2020-09-29

## 2020-10-20 ENCOUNTER — APPOINTMENT (OUTPATIENT)
Dept: PAIN MEDICINE | Facility: CLINIC | Age: 55
End: 2020-10-20

## 2020-10-27 ENCOUNTER — HOSPITAL ENCOUNTER (OUTPATIENT)
Dept: PAIN MEDICINE | Facility: HOSPITAL | Age: 55
Discharge: HOME | End: 2020-10-27

## 2020-10-27 ENCOUNTER — RESULTS ENCOUNTER (OUTPATIENT)
Dept: PAIN MEDICINE | Facility: HOSPITAL | Age: 55
End: 2020-10-27

## 2020-10-27 VITALS
SYSTOLIC BLOOD PRESSURE: 123 MMHG | WEIGHT: 135 LBS | TEMPERATURE: 96.9 F | DIASTOLIC BLOOD PRESSURE: 82 MMHG | OXYGEN SATURATION: 100 % | RESPIRATION RATE: 16 BRPM | HEART RATE: 63 BPM | BODY MASS INDEX: 20.46 KG/M2 | HEIGHT: 68 IN

## 2020-10-27 DIAGNOSIS — F19.90 CURRENT DRUG USE: Primary | ICD-10-CM

## 2020-10-27 DIAGNOSIS — M48.02 NEUROFORAMINAL STENOSIS OF CERVICAL SPINE: ICD-10-CM

## 2020-10-27 DIAGNOSIS — M54.12 CERVICAL RADICULOPATHY: ICD-10-CM

## 2020-10-27 DIAGNOSIS — F19.90 CURRENT DRUG USE: ICD-10-CM

## 2020-10-27 DIAGNOSIS — R52 PAIN: ICD-10-CM

## 2020-10-27 DIAGNOSIS — M54.2 CERVICALGIA: Primary | ICD-10-CM

## 2020-10-27 PROCEDURE — 62321 NJX INTERLAMINAR CRV/THRC: CPT | Performed by: PHYSICAL MEDICINE & REHABILITATION

## 2020-10-27 PROCEDURE — 77003 FLUOROGUIDE FOR SPINE INJECT: CPT

## 2020-10-27 PROCEDURE — 25010000002 DEXAMETHASONE SODIUM PHOSPHATE 10 MG/ML SOLUTION

## 2020-10-27 RX ORDER — DEXAMETHASONE SODIUM PHOSPHATE 10 MG/ML
INJECTION, SOLUTION INTRAMUSCULAR; INTRAVENOUS
Status: DISPENSED
Start: 2020-10-27 | End: 2020-10-27

## 2020-10-27 RX ORDER — HYDROCODONE BITARTRATE AND ACETAMINOPHEN 10; 325 MG/1; MG/1
1 TABLET ORAL EVERY 6 HOURS PRN
Qty: 120 TABLET | Refills: 0 | Status: SHIPPED | OUTPATIENT
Start: 2020-10-27 | End: 2020-10-27 | Stop reason: SDUPTHER

## 2020-10-27 RX ORDER — HYDROCODONE BITARTRATE AND ACETAMINOPHEN 10; 325 MG/1; MG/1
1 TABLET ORAL EVERY 6 HOURS PRN
Qty: 120 TABLET | Refills: 0 | Status: SHIPPED | OUTPATIENT
Start: 2020-10-27 | End: 2021-02-02 | Stop reason: SDUPTHER

## 2020-10-27 RX ORDER — DEXAMETHASONE SODIUM PHOSPHATE 10 MG/ML
10 INJECTION, SOLUTION INTRAMUSCULAR; INTRAVENOUS ONCE
Status: COMPLETED | OUTPATIENT
Start: 2020-10-27 | End: 2020-10-27

## 2020-10-27 RX ADMIN — DEXAMETHASONE SODIUM PHOSPHATE 10 MG: 10 INJECTION, SOLUTION INTRAMUSCULAR; INTRAVENOUS at 10:29

## 2020-10-27 NOTE — H&P
Patient Care Team:  Ema Moore MD as PCP - General (Family Medicine)  Shanita Rodriguez as Technologist    Chief complaint Headaches    Subjective     Chronic daily headaches, also widespread joint pain with SLE, 10/10 at worst, 1/10 at best, always present, varies, aching, stabbing, worse with weather changes, interferes with ADLs, sleep, failed chiropractor, PT, massage, occipital neurotomy, headaches stim trial. MRI brain wnl. Prior notes reviewed, as above, was seeing Dr. Laguerre with Norco 10mg QID prn and Imitrex 100mg #9/month with some relief, also started Amovig 70mg, uses Zofran 4mg prn. No FH of substance abuse. 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 Here for 2nd of 3 cervical ESIs, symptoms essentially resolved after 1st, beginning to return. Denies anticoagulation, current infection or ABX, does have an allergy to IV contrast.      Review of Systems     Past Medical History:   Diagnosis Date   • Headache    • Heart disease    • Hypertension    • Kidney disease    • Lupus (CMS/HCC)    • Thyroid disease      Past Surgical History:   Procedure Laterality Date   • APPENDECTOMY     • CARDIAC CATHETERIZATION     • CHOLECYSTECTOMY     • OCCIPITAL NEURECTOMY  2010   • OVARY SURGERY       Family History   Problem Relation Age of Onset   • Hypertension Mother    • Cancer Father    • Arthritis Sister    • Heart disease Brother    • No Known Problems Brother    • No Known Problems Brother      Social History     Tobacco Use   • Smoking status: Never Smoker   • Smokeless tobacco: Never Used   Substance Use Topics   • Alcohol use: No     Frequency: Never   • Drug use: Never     (Not in a hospital admission)    Allergies:  Contrast dye, Iodinated diagnostic agents, Sulfa antibiotics, Tizanidine, and Zanaflex  [tizanidine hcl]    Objective      Vital Signs  Temp:  [96.9 °F (36.1 °C)] 96.9 °F (36.1 °C)  Heart  Rate:  [59] 59  Resp:  [16] 16  BP: (123)/(78) 123/78    Physical Exam    Results Review:   None      Assessment/Plan       * No active hospital problems. *      ICD-10-CM ICD-9-CM   1. Cervicalgia  M54.2 723.1   2. Neuroforaminal stenosis of cervical spine  M99.71 723.0   3. Cervical radiculopathy  M54.12 723.4         Assessment & Plan    I discussed the patients findings and my recommendations with patient     UDS in order 11/5/19. Inspect reviewed, in order.  Treatment plan will consist of continuing current medication as long as it remains effective and is necessary, while evaluating patient at each visit and determining if the medication can be lowered or discontinued, while also using nonopioid therapies to reduce reliance on opioids.  Cont Norco 10mg QID prn, Imitrex 100mg #9.  Began zofran 4mg TID prn.  2nd of 3 cervical ESIs per MRI. No contrast per allergy. Some discomfort with left paramedian approach, will perform on right in future.  Failed headache procedures.  RTC 3 months for f/u.      Cervical Epidural Steroid Injection    PREOPERATIVE DIAGNOSIS: Cervical radiculopathy    POSTOPERATIVE DIAGNOSIS: Cervical radiculopathy    PROCEDURE PERFORMED: Cervical Epidural Steroid Injection    The patient presents with a history of  [ cervical ] degenerative disc disease. The patient presents today for a [ cervical ]  epidural steroid injection at level left C7-T1. The patient understands the risks and benefits of the procedure and wishes to proceed.  The patient was seen in the preoperative area.  Patient's consent was obtained and updated.  Vitals were taken.  Patient was then brought to the procedure suite and placed in a prone position. The appropriate anatomic area was widely prepped with Chloraprep and draped in a sterile fashion. Under fluoroscopic guidance using AP view a 20 gauge styleted tuohy needle was passed through skin anesthetized with 1% Lidocaine without epinephrine.  The needle was advanced  using the continuous loss of resistance technique into the C7-T1 epidural space after first advancing to the T1 lamina, then redirecting in a superior and medial fashion. Needle tip placement in the epidural space was confirmed by loss of resistance. Preservative free contrast not used due to allergy.  Following this [ 3 ] mL of a solution containing [ Dexamethasone 10mg and saline 2ml ] was carefully administered in the epidural space.A sterile dressing was placed over the puncture site.    The patient tolerated the procedure with [ no complications ]. They were then brought to the post procedure area where they recovered nicely.       Abdirahman Epps MD  10/27/20  10:09 EDT    Time: Discharge 15 min    Physical Exam

## 2020-12-01 ENCOUNTER — HOSPITAL ENCOUNTER (OUTPATIENT)
Dept: PAIN MEDICINE | Facility: HOSPITAL | Age: 55
Discharge: HOME | End: 2020-12-01

## 2020-12-01 VITALS
BODY MASS INDEX: 20.31 KG/M2 | HEIGHT: 68 IN | SYSTOLIC BLOOD PRESSURE: 108 MMHG | DIASTOLIC BLOOD PRESSURE: 75 MMHG | WEIGHT: 134 LBS | HEART RATE: 65 BPM | RESPIRATION RATE: 16 BRPM | OXYGEN SATURATION: 98 % | TEMPERATURE: 97.3 F

## 2020-12-01 DIAGNOSIS — M54.12 CERVICAL RADICULOPATHY: ICD-10-CM

## 2020-12-01 DIAGNOSIS — M48.02 NEUROFORAMINAL STENOSIS OF CERVICAL SPINE: ICD-10-CM

## 2020-12-01 DIAGNOSIS — R52 PAIN: ICD-10-CM

## 2020-12-01 DIAGNOSIS — M54.2 CERVICALGIA: Primary | ICD-10-CM

## 2020-12-01 PROCEDURE — 25010000002 DEXAMETHASONE SODIUM PHOSPHATE 10 MG/ML SOLUTION

## 2020-12-01 PROCEDURE — 77003 FLUOROGUIDE FOR SPINE INJECT: CPT

## 2020-12-01 PROCEDURE — 62321 NJX INTERLAMINAR CRV/THRC: CPT | Performed by: PHYSICAL MEDICINE & REHABILITATION

## 2020-12-01 RX ORDER — DEXAMETHASONE SODIUM PHOSPHATE 10 MG/ML
10 INJECTION, SOLUTION INTRAMUSCULAR; INTRAVENOUS ONCE
Status: COMPLETED | OUTPATIENT
Start: 2020-12-01 | End: 2020-12-01

## 2020-12-01 RX ORDER — DEXAMETHASONE SODIUM PHOSPHATE 10 MG/ML
INJECTION, SOLUTION INTRAMUSCULAR; INTRAVENOUS
Status: COMPLETED
Start: 2020-12-01 | End: 2020-12-01

## 2020-12-01 RX ADMIN — DEXAMETHASONE SODIUM PHOSPHATE 10 MG: 10 INJECTION, SOLUTION INTRAMUSCULAR; INTRAVENOUS at 10:28

## 2020-12-01 NOTE — H&P
Patient Care Team:  Ema Moore MD as PCP - General (Family Medicine)  Shanita Rodriguez as Technologist    Chief complaint Headaches    Subjective     Chronic daily headaches, also widespread joint pain with SLE, 10/10 at worst, 1/10 at best, always present, varies, aching, stabbing, worse with weather changes, interferes with ADLs, sleep, failed chiropractor, PT, massage, occipital neurotomy, headaches stim trial. MRI brain wnl. Prior notes reviewed, as above, was seeing Dr. Laguerre with Norco 10mg QID prn and Imitrex 100mg #9/month with some relief, also started Amovig 70mg, uses Zofran 4mg prn. No FH of substance abuse. 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 Here for 3rd of 3 cervical ESIs, symptoms essentially resolved after 1st, improved after 2nd, weakness worsening with a lot of lifting at work. Denies anticoagulation, current infection or ABX, does have an allergy to IV contrast.      Review of Systems     Past Medical History:   Diagnosis Date   • Headache    • Heart disease    • Hypertension    • Kidney disease    • Lupus (CMS/HCC)    • Thyroid disease      Past Surgical History:   Procedure Laterality Date   • APPENDECTOMY     • CARDIAC CATHETERIZATION     • CHOLECYSTECTOMY     • OCCIPITAL NEURECTOMY  2010   • OVARY SURGERY       Family History   Problem Relation Age of Onset   • Hypertension Mother    • Cancer Father    • Arthritis Sister    • Heart disease Brother    • No Known Problems Brother    • No Known Problems Brother      Social History     Tobacco Use   • Smoking status: Never Smoker   • Smokeless tobacco: Never Used   Substance Use Topics   • Alcohol use: No     Frequency: Never   • Drug use: Never     (Not in a hospital admission)    Allergies:  Contrast dye, Iodinated diagnostic agents, Sulfa antibiotics, Tizanidine, and Zanaflex  [tizanidine hcl]    Objective      Vital  Signs  Temp:  [97.3 °F (36.3 °C)] 97.3 °F (36.3 °C)  Heart Rate:  [64] 64  Resp:  [16] 16  BP: (119)/(75) 119/75    Physical Exam    Results Review:   None      Assessment/Plan       * No active hospital problems. *      ICD-10-CM ICD-9-CM   1. Cervicalgia  M54.2 723.1   2. Cervical radiculopathy  M54.12 723.4   3. Neuroforaminal stenosis of cervical spine  M99.71 723.0         Assessment & Plan    I discussed the patients findings and my recommendations with patient     UDS in order 11/5/19. Inspect reviewed, in order.  Treatment plan will consist of continuing current medication as long as it remains effective and is necessary, while evaluating patient at each visit and determining if the medication can be lowered or discontinued, while also using nonopioid therapies to reduce reliance on opioids.  Cont Norco 10mg QID prn, Imitrex 100mg #9.  Began zofran 4mg TID prn.  3rd of 3 cervical ESIs per MRI. No contrast per allergy. Some discomfort with left paramedian approach, will perform on right in future.  Failed headache procedures.  RTC by 2/3/21 for f/u.      Cervical Epidural Steroid Injection    PREOPERATIVE DIAGNOSIS: Cervical radiculopathy    POSTOPERATIVE DIAGNOSIS: Cervical radiculopathy    PROCEDURE PERFORMED: Cervical Epidural Steroid Injection    The patient presents with a history of  [ cervical ] degenerative disc disease. The patient presents today for a [ cervical ]  epidural steroid injection at level right C7-T1. The patient understands the risks and benefits of the procedure and wishes to proceed.  The patient was seen in the preoperative area.  Patient's consent was obtained and updated.  Vitals were taken.  Patient was then brought to the procedure suite and placed in a prone position. The appropriate anatomic area was widely prepped with Chloraprep and draped in a sterile fashion. Under fluoroscopic guidance using AP view a 20 gauge styleted tuohy needle was passed through skin anesthetized with  1% Lidocaine without epinephrine.  The needle was advanced using the continuous loss of resistance technique into the C7-T1 epidural space after first advancing to the T1 lamina, then redirecting in a superior and medial fashion. Needle tip placement in the epidural space was confirmed by clear and dramatic loss of resistance. Preservative free contrast not used due to allergy.  Following this [ 3 ] mL of a solution containing [ Dexamethasone 10mg and saline 2ml ] was carefully administered in the epidural space.A sterile dressing was placed over the puncture site.    The patient tolerated the procedure with [ no complications ]. They were then brought to the post procedure area where they recovered nicely.       Abdirahman Epps MD  12/01/20  10:11 EST    Time: Discharge 15 min    Physical Exam

## 2020-12-10 RX ORDER — ERENUMAB-AOOE 70 MG/ML
INJECTION SUBCUTANEOUS
Qty: 1 PEN | Refills: 12 | Status: SHIPPED | OUTPATIENT
Start: 2020-12-10 | End: 2021-08-20 | Stop reason: SDUPTHER

## 2020-12-31 RX ORDER — LEVOTHYROXINE SODIUM 75 MCG
75 TABLET ORAL DAILY
Qty: 90 TABLET | Refills: 3 | Status: SHIPPED | OUTPATIENT
Start: 2020-12-31 | End: 2021-12-06 | Stop reason: SDUPTHER

## 2021-01-12 NOTE — TELEPHONE ENCOUNTER
Please inform patient I have renewed sertraline but would like her to schedule appointment prior to next refill for follow-up on depression

## 2021-02-01 ENCOUNTER — TELEPHONE (OUTPATIENT)
Dept: PAIN MEDICINE | Facility: CLINIC | Age: 56
End: 2021-02-01

## 2021-02-01 NOTE — TELEPHONE ENCOUNTER
[employer]FF ATTEMPTED WARM TRANSFER PROCESS & WAS UNSUCCESSFUL     Caller: Tasneem Valentino    Relationship to patient: Self    Best call back number: [phone]     Chief complaint: PATIENT SEEN AT URGENT CARE TODAY 02/01/21 AND HAS HAD COVID SYMPTOMS. AWAITING TEST RESULTS.     Type of visit: 2 MONTH FOLLOW UP / CHRONIC MIGRAINES / gave address    Requested date: 2 WEEKS FROM 02/02/21 - 02/16/21 OR LATER    If rescheduling, when is the original appointment: 02/02/21 @1400    PATIENT CALL BACK [phone]     THANKS

## 2021-02-02 ENCOUNTER — OFFICE VISIT (OUTPATIENT)
Dept: PAIN MEDICINE | Facility: CLINIC | Age: 56
End: 2021-02-02

## 2021-02-02 VITALS — WEIGHT: 134 LBS | BODY MASS INDEX: 20.31 KG/M2 | HEIGHT: 68 IN

## 2021-02-02 DIAGNOSIS — M47.812 CERVICAL SPONDYLOSIS WITHOUT MYELOPATHY: ICD-10-CM

## 2021-02-02 DIAGNOSIS — M48.02 NEUROFORAMINAL STENOSIS OF CERVICAL SPINE: ICD-10-CM

## 2021-02-02 DIAGNOSIS — M25.50 PAIN IN JOINT INVOLVING MULTIPLE SITES: ICD-10-CM

## 2021-02-02 DIAGNOSIS — M54.2 CERVICALGIA: Primary | ICD-10-CM

## 2021-02-02 DIAGNOSIS — M54.12 CERVICAL RADICULOPATHY: ICD-10-CM

## 2021-02-02 PROCEDURE — 99441 PR PHYS/QHP TELEPHONE EVALUATION 5-10 MIN: CPT | Performed by: PHYSICAL MEDICINE & REHABILITATION

## 2021-02-02 RX ORDER — HYDROCODONE BITARTRATE AND ACETAMINOPHEN 10; 325 MG/1; MG/1
1 TABLET ORAL EVERY 6 HOURS PRN
Qty: 120 TABLET | Refills: 0 | Status: SHIPPED | OUTPATIENT
Start: 2021-02-02 | End: 2021-04-29 | Stop reason: SDUPTHER

## 2021-02-02 RX ORDER — SUMATRIPTAN 100 MG/1
100 TABLET, FILM COATED ORAL ONCE AS NEEDED
Qty: 9 TABLET | Refills: 2 | Status: SHIPPED | OUTPATIENT
Start: 2021-02-02 | End: 2021-05-03

## 2021-02-02 NOTE — PROGRESS NOTES
Subjective   Tasneem Valentino is a 56 y.o. female.     Chronic daily headaches, also widespread joint pain with SLE, 10/10 at worst, 1/10 at best, always present, varies, aching, stabbing, worse with weather changes, interferes with ADLs, sleep, failed chiropractor, PT, massage, occipital neurotomy, headaches stim trial. MRI brain wnl. Prior notes reviewed, as above, was seeing Dr. Laguerre with Norco 10mg QID prn and Imitrex 100mg #9/month with some relief, also started Amovig 70mg, uses Zofran 4mg prn. No FH of substance abuse. 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 Had 3 cervical ESIs, symptoms essentially resolved after 1st, improved after 2nd, weakness worsening with a lot of lifting at work.        The following portions of the patient's history were reviewed and updated as appropriate: allergies, current medications, past family history, past medical history, past social history, past surgical history and problem list.    Review of Systems   Constitutional: Negative for chills, fatigue and fever.   HENT: Positive for hearing loss. Negative for trouble swallowing.    Eyes: Positive for visual disturbance.   Respiratory: Negative for shortness of breath.    Cardiovascular: Negative for chest pain.   Gastrointestinal: Positive for constipation. Negative for abdominal pain, diarrhea, nausea and vomiting.   Genitourinary: Negative for urinary incontinence.   Musculoskeletal: Negative for arthralgias, back pain, joint swelling, myalgias and neck pain.   Neurological: Positive for dizziness and headache. Negative for weakness and numbness.       Objective   Physical Exam   Constitutional: She is oriented to person, place, and time. She appears well-developed and well-nourished.   Neurological: She is alert and oriented to person, place, and time. She has normal reflexes.   Psychiatric: Her behavior is normal. Mood, judgment and  thought content normal.         Assessment/Plan   Diagnoses and all orders for this visit:    1. Cervicalgia (Primary)    2. Pain in joint involving multiple sites    3. Cervical radiculopathy    4. Cervical spondylosis without myelopathy    5. Neuroforaminal stenosis of cervical spine        UDS in order 10/27/20. Inspect reviewed, in order.  Treatment plan will consist of continuing current medication as long as it remains effective and is necessary, while evaluating patient at each visit and determining if the medication can be lowered or discontinued, while also using nonopioid therapies to reduce reliance on opioids.  Cont Norco 10mg QID prn, Imitrex 100mg #9.  Began zofran 4mg TID prn.  Had 3 cervical ESIs per MRI. No contrast per allergy. Some discomfort with left paramedian approach, will perform on right in future.  Failed headache procedures.  RTC in 3 months for f/u. Telephone visit, spent 6 minutes discussing her medications and plan of care, sinus infection.

## 2021-02-17 ENCOUNTER — OFFICE VISIT (OUTPATIENT)
Dept: FAMILY MEDICINE CLINIC | Facility: CLINIC | Age: 56
End: 2021-02-17

## 2021-02-17 VITALS
OXYGEN SATURATION: 100 % | HEIGHT: 68 IN | SYSTOLIC BLOOD PRESSURE: 123 MMHG | TEMPERATURE: 97.5 F | HEART RATE: 73 BPM | BODY MASS INDEX: 21.55 KG/M2 | RESPIRATION RATE: 16 BRPM | WEIGHT: 142.2 LBS | DIASTOLIC BLOOD PRESSURE: 65 MMHG

## 2021-02-17 DIAGNOSIS — F41.9 ANXIETY: ICD-10-CM

## 2021-02-17 DIAGNOSIS — J01.00 SUBACUTE MAXILLARY SINUSITIS: Primary | ICD-10-CM

## 2021-02-17 PROCEDURE — 99214 OFFICE O/P EST MOD 30 MIN: CPT | Performed by: FAMILY MEDICINE

## 2021-02-17 RX ORDER — FLUTICASONE PROPIONATE 50 MCG
2 SPRAY, SUSPENSION (ML) NASAL AS NEEDED
COMMUNITY

## 2021-02-17 RX ORDER — DOXYCYCLINE HYCLATE 100 MG/1
100 TABLET, DELAYED RELEASE ORAL 2 TIMES DAILY
Qty: 42 TABLET | Refills: 0 | Status: SHIPPED | OUTPATIENT
Start: 2021-02-17 | End: 2021-03-22

## 2021-02-17 NOTE — PROGRESS NOTES
Chief Complaint  Annual Exam and Anxiety    Subjective          History of Present Illness  Tasneem Valentino presents to [facility] to follow-up on anxiety. Patient is taking Zoloft  50 mg half tablet in the morning and a whole tablet in the pm. Patient states it does help well with anxiety.    She states she is having ongoing trouble with her sinuses. She has completed 10 day course of Augmentin on February 10. She is still complaining of sinus pressure and tenderness especially on left check and with yellow sputum, especially in am. Using flonase and zyrtec daily and benadryl at night. Some help. Have seen ENT in past last over 1 year ago, have had sinus surgery. ENT in past has told her she should take 21 day course.     Current Outpatient Medications on File Prior to Visit   Medication Sig   • Aimovig 70 MG/ML prefilled syringe INJECT 70 MG SUBCUTANEOUSLY EVERY 4 WEEKS (G43.909)   • carvedilol (COREG) 3.125 MG tablet Take 3.125 mg by mouth 2 (Two) Times a Day With Meals.   • Cetirizine HCl 10 MG capsule Take 10 mg by mouth Daily.   • cholecalciferol (VITAMIN D3) 1.25 MG (54571 UT) capsule 1 p.o. weekly for 8 weeks then 1 every other week (Patient taking differently: Take once monthly)   • coenzyme Q10 100 MG capsule Take 100 mg by mouth Daily.   • dilTIAZem CD (CARDIZEM CD) 360 MG 24 hr capsule Take 360 mg by mouth Daily.   • diphenhydrAMINE (BENADRYL ALLERGY) 25 MG tablet Take 25 mg by mouth At Night As Needed.   • estradiol (ESTRACE) 0.5 MG tablet Take 0.5 mg by mouth Daily. Half tablet daily   • fluticasone (Flonase Allergy Relief) 50 MCG/ACT nasal spray 2 sprays into the nostril(s) as directed by provider As Needed for Rhinitis.   • HYDROcodone-acetaminophen (Norco)  MG per tablet Take 1 tablet by mouth Every 6 (Six) Hours As Needed for Moderate Pain .   • hydroxychloroquine (PLAQUENIL) 200 MG tablet Take 200 mg by mouth 2 (Two) Times a Day.   • Magnesium 500 MG capsule Take 500 mg  "by mouth Daily.   • meclizine (ANTIVERT) 25 MG tablet Take 25 mg by mouth 3 (Three) Times a Day As Needed.   • Melatonin 5 MG capsule Take 5 mg by mouth Every Night.   • nitroglycerin (NITROSTAT) 0.4 MG SL tablet Nitrostat 0.4 mg sublingual tablet   TAKE AS DIRECTED   • omeprazole (priLOSEC) 20 MG capsule Take 20 mg by mouth Daily.   • ondansetron (ZOFRAN) 4 MG tablet Take 1 tablet by mouth Every 8 (Eight) Hours As Needed for Nausea or Vomiting.   • progesterone (PROMETRIUM) 100 MG capsule Take 100 mg by mouth Daily.   • SUMAtriptan (IMITREX) 100 MG tablet Take 1 tablet by mouth 1 (One) Time As Needed for Migraine for up to 1 dose. At onset of headache. Repeat one time in 2 hours if needed.   • Synthroid 75 MCG tablet Take 1 tablet by mouth Daily.   • topiramate (TOPAMAX) 100 MG tablet Take 100 mg by mouth 2 (Two) Times a Day.   • [DISCONTINUED] sertraline (ZOLOFT) 50 MG tablet 1/2 TABLET IN THE MORNING AND 1 TABLET AT BEDTIME   • cyanocobalamin 1000 MCG/ML injection    • HYDROcodone-acetaminophen (Norco)  MG per tablet Take 1 tablet by mouth Every 6 (Six) Hours As Needed for Moderate Pain .   • HYDROcodone-acetaminophen (Norco)  MG per tablet Take 1 tablet by mouth Every 6 (Six) Hours As Needed for Moderate Pain .   • [DISCONTINUED] Potassium Citrate ER 15 MEQ (1620 MG) tablet controlled-release potassium citrate ER 15 mEq (1,620 mg) tablet,extended release     No current facility-administered medications on file prior to visit.        Objective   Vital Signs:   /65 (BP Location: Left arm, Patient Position: Sitting, Cuff Size: Adult)   Pulse 73   Temp 97.5 °F (36.4 °C) (Infrared)   Resp 16   Ht 172.7 cm (68\")   Wt 64.5 kg (142 lb 3.2 oz)   SpO2 100%   BMI 21.62 kg/m²     Physical Exam  Vitals signs and nursing note reviewed.   Constitutional:       General: She is not in acute distress.     Appearance: She is well-developed.      Comments: Somewhat ill-appearing, squinting and looking " downward.   HENT:      Head: Normocephalic and atraumatic.      Nose: No congestion or rhinorrhea.      Comments: Nasal mucosa somewhat dry.  There is moderate tenderness palpation left maxillary sinus and less so to left frontal sinus     Mouth/Throat:      Mouth: Mucous membranes are moist.      Pharynx: No oropharyngeal exudate or posterior oropharyngeal erythema.   Eyes:      Extraocular Movements: Extraocular movements intact.      Pupils: Pupils are equal, round, and reactive to light.   Neck:      Musculoskeletal: Normal range of motion and neck supple. No muscular tenderness.   Cardiovascular:      Rate and Rhythm: Normal rate and regular rhythm.      Heart sounds: No murmur.   Pulmonary:      Effort: Pulmonary effort is normal.      Breath sounds: Normal breath sounds. No wheezing.   Musculoskeletal: Normal range of motion.   Skin:     General: Skin is warm and dry.      Findings: No rash.   Neurological:      Mental Status: She is alert and oriented to person, place, and time.                No results found for: HGBA1C    Result Review :                       Assessment and Plan    Diagnoses and all orders for this visit:    1. Subacute maxillary sinusitis (Primary)  -     doxycycline (DORYX) 100 MG enteric coated tablet; Take 1 tablet by mouth 2 (Two) Times a Day.  Dispense: 42 tablet; Refill: 0    2. Anxiety  -     sertraline (ZOLOFT) 50 MG tablet; 1/2 in am 1 at hs  Dispense: 45 tablet; Refill: 1    Other orders  -     Discontinue: sertraline (ZOLOFT) 50 MG tablet; 1/2 in am 1 at hs  Dispense: 135 tablet; Refill: 3    Anxiety well-controlled continue Zoloft, renewing as requested both to mail order and local pharmacies as she will run out prior to receiving mail order supply.  Recurrent/persistent sinusitis following completion of Augmentin.  Prescription for doxycycline.  Continue Flonase, consider addition of guaifenesin to thin mucus and highly recommend nasal saline.    Medications Discontinued  During This Encounter   Medication Reason   • Potassium Citrate ER 15 MEQ (1620 MG) tablet controlled-release *Therapy completed   • sertraline (ZOLOFT) 50 MG tablet Reorder   • sertraline (ZOLOFT) 50 MG tablet          Follow Up     Return in about 3 weeks (around 3/10/2021) for Annual physical (no pap- get with Dr Hetal Lundberg in [place]- need report) .  Patient is also due for colonoscopy or Cologuard, not interested in scheduling at this point will discuss further at upcoming annual physical.  Patient was given instructions and counseling regarding her condition or for health maintenance advice. Please see specific information pulled into the AVS if appropriate.

## 2021-03-11 RX ORDER — TOPIRAMATE 100 MG/1
100 TABLET, FILM COATED ORAL 2 TIMES DAILY
Qty: 180 TABLET | Refills: 3 | Status: SHIPPED | OUTPATIENT
Start: 2021-03-11 | End: 2021-12-06 | Stop reason: SDUPTHER

## 2021-03-22 ENCOUNTER — OFFICE VISIT (OUTPATIENT)
Dept: FAMILY MEDICINE CLINIC | Facility: CLINIC | Age: 56
End: 2021-03-22

## 2021-03-22 VITALS
SYSTOLIC BLOOD PRESSURE: 113 MMHG | WEIGHT: 140.38 LBS | HEIGHT: 68 IN | HEART RATE: 74 BPM | TEMPERATURE: 97.5 F | DIASTOLIC BLOOD PRESSURE: 66 MMHG | OXYGEN SATURATION: 99 % | BODY MASS INDEX: 21.28 KG/M2 | RESPIRATION RATE: 16 BRPM

## 2021-03-22 DIAGNOSIS — E03.9 ACQUIRED HYPOTHYROIDISM: ICD-10-CM

## 2021-03-22 DIAGNOSIS — Z11.59 NEED FOR HEPATITIS C SCREENING TEST: ICD-10-CM

## 2021-03-22 DIAGNOSIS — J30.1 SEASONAL ALLERGIC RHINITIS DUE TO POLLEN: ICD-10-CM

## 2021-03-22 DIAGNOSIS — N18.2 KIDNEY DISEASE, CHRONIC, STAGE II (GFR 60-89 ML/MIN): ICD-10-CM

## 2021-03-22 DIAGNOSIS — M32.9 SYSTEMIC LUPUS ERYTHEMATOSUS, UNSPECIFIED SLE TYPE, UNSPECIFIED ORGAN INVOLVEMENT STATUS (HCC): ICD-10-CM

## 2021-03-22 DIAGNOSIS — E55.9 VITAMIN D DEFICIENCY: ICD-10-CM

## 2021-03-22 DIAGNOSIS — Z00.00 ANNUAL PHYSICAL EXAM: Primary | ICD-10-CM

## 2021-03-22 DIAGNOSIS — I10 ESSENTIAL HYPERTENSION: ICD-10-CM

## 2021-03-22 DIAGNOSIS — I10 BENIGN ESSENTIAL HYPERTENSION: ICD-10-CM

## 2021-03-22 PROCEDURE — 99396 PREV VISIT EST AGE 40-64: CPT | Performed by: FAMILY MEDICINE

## 2021-03-22 RX ORDER — CYCLOSPORINE 0.5 MG/ML
1 EMULSION OPHTHALMIC DAILY
COMMUNITY
Start: 2021-03-03 | End: 2021-07-22

## 2021-03-22 NOTE — PROGRESS NOTES
Chief Complaint  Annual Exam and Sinusitis    Subjective          History of Present Illness  Tasneem Valentino presents to [facility] for annual physical exam and fu on her siniusitis. Patient has recently seen her rheumatologist Dr Parnell with . Patient states she is better from the sinusitis she does have allergies and wakes up in the morning with mucus in the back her throat and eye matter.     Mammogram 10/9/2019 BI Rad 1 Negative   Dexa Scan Never  [facility]/20 [facility]   Colonoscopy at [facility] >10 years ago GI issues including ulcers and abd pain.     Persistant nausea in ams improved in afternoons, believe due to post nasal drainage. Do use zyrtiec, flonase and benadryl daily. Help some. guafinasin may help but dont like taking the extra medication. Did not go through allergy testing.     Dr. Good did reduce vitamin D to  once monthly.  She had labs with Dr. Good in January, those records are not available.  Prior to that with Dr. Parnell at  rheumatology in November, vitamin D in November was 46.1,, B12 was 642      Current Outpatient Medications on File Prior to Visit   Medication Sig   • Aimovig 70 MG/ML prefilled syringe INJECT 70 MG SUBCUTANEOUSLY EVERY 4 WEEKS (G43.909)   • carvedilol (COREG) 3.125 MG tablet Take 3.125 mg by mouth 2 (Two) Times a Day With Meals.   • Cetirizine HCl 10 MG capsule Take 10 mg by mouth Daily.   • coenzyme Q10 100 MG capsule Take 100 mg by mouth Daily.   • dilTIAZem CD (CARDIZEM CD) 360 MG 24 hr capsule Take 360 mg by mouth Daily.   • diphenhydrAMINE (BENADRYL ALLERGY) 25 MG tablet Take 25 mg by mouth At Night As Needed.   • estradiol (ESTRACE) 0.5 MG tablet Take 0.5 mg by mouth Daily. Half tablet daily   • fluticasone (Flonase Allergy Relief) 50 MCG/ACT nasal spray 2 sprays into the nostril(s) as directed by provider As Needed for Rhinitis.   • HYDROcodone-acetaminophen (Norco)   MG per tablet Take 1 tablet by mouth Every 6 (Six) Hours As Needed for Moderate Pain .   • hydroxychloroquine (PLAQUENIL) 200 MG tablet Take 200 mg by mouth 2 (Two) Times a Day.   • Magnesium 500 MG capsule Take 500 mg by mouth Daily.   • meclizine (ANTIVERT) 25 MG tablet Take 25 mg by mouth 3 (Three) Times a Day As Needed.   • Melatonin 5 MG capsule Take 5 mg by mouth Every Night.   • nitroglycerin (NITROSTAT) 0.4 MG SL tablet Nitrostat 0.4 mg sublingual tablet   TAKE AS DIRECTED   • omeprazole (priLOSEC) 20 MG capsule Take 20 mg by mouth Daily.   • ondansetron (ZOFRAN) 4 MG tablet Take 1 tablet by mouth Every 8 (Eight) Hours As Needed for Nausea or Vomiting.   • progesterone (PROMETRIUM) 100 MG capsule Take 100 mg by mouth Daily.   • sertraline (ZOLOFT) 50 MG tablet 1/2 in am 1 at hs   • SUMAtriptan (IMITREX) 100 MG tablet Take 1 tablet by mouth 1 (One) Time As Needed for Migraine for up to 1 dose. At onset of headache. Repeat one time in 2 hours if needed.   • Synthroid 75 MCG tablet Take 1 tablet by mouth Daily.   • topiramate (TOPAMAX) 100 MG tablet Take 1 tablet by mouth 2 (Two) Times a Day.   • [DISCONTINUED] cholecalciferol (VITAMIN D3) 1.25 MG (25834 UT) capsule 1 p.o. weekly for 8 weeks then 1 every other week (Patient taking differently: Take once monthly)   • HYDROcodone-acetaminophen (Norco)  MG per tablet Take 1 tablet by mouth Every 6 (Six) Hours As Needed for Moderate Pain .   • HYDROcodone-acetaminophen (Norco)  MG per tablet Take 1 tablet by mouth Every 6 (Six) Hours As Needed for Moderate Pain .   • Restasis 0.05 % ophthalmic emulsion Administer 1 drop to both eyes Daily.   • [DISCONTINUED] cyanocobalamin 1000 MCG/ML injection    • [DISCONTINUED] doxycycline (DORYX) 100 MG enteric coated tablet Take 1 tablet by mouth 2 (Two) Times a Day.     No current facility-administered medications on file prior to visit.       Objective   Vital Signs:   /66 (BP Location: Left arm,  "Patient Position: Sitting, Cuff Size: Adult)   Pulse 74   Temp 97.5 °F (36.4 °C) (Infrared)   Resp 16   Ht 172.7 cm (68\")   Wt 63.7 kg (140 lb 6.1 oz)   SpO2 99%   BMI 21.34 kg/m²     Physical Exam  Vitals and nursing note reviewed.   Constitutional:       General: She is not in acute distress.     Appearance: Normal appearance. She is well-developed.      Comments: thin   HENT:      Head: Normocephalic and atraumatic.   Eyes:      Conjunctiva/sclera: Conjunctivae normal.      Pupils: Pupils are equal, round, and reactive to light.   Neck:      Thyroid: No thyromegaly.      Vascular: No JVD.   Cardiovascular:      Rate and Rhythm: Normal rate and regular rhythm.      Heart sounds: Normal heart sounds. No murmur heard.     Pulmonary:      Breath sounds: Normal breath sounds. No wheezing or rales.   Musculoskeletal:         General: Normal range of motion.      Cervical back: Normal range of motion.   Lymphadenopathy:      Cervical: No cervical adenopathy.   Skin:     General: Skin is warm and dry.      Findings: No rash.   Neurological:      Mental Status: She is alert and oriented to person, place, and time.   Psychiatric:         Mood and Affect: Mood normal.         Behavior: Behavior normal.                No results found for: HGBA1C  No visits with results within 3 Month(s) from this visit.   Latest known visit with results is:   Office Visit on 06/23/2020   Component Date Value Ref Range Status   • Glucose 06/23/2020 98  65 - 99 mg/dL Final   • BUN 06/23/2020 15  6 - 24 mg/dL Final   • Creatinine 06/23/2020 1.16* 0.57 - 1.00 mg/dL Final   • eGFR Non African Am 06/23/2020 54* >59 mL/min/1.73 Final   • eGFR African Am 06/23/2020 62  >59 mL/min/1.73 Final   • BUN/Creatinine Ratio 06/23/2020 13  9 - 23 Final   • Sodium 06/23/2020 141  134 - 144 mmol/L Final   • Potassium 06/23/2020 4.2  3.5 - 5.2 mmol/L Final   • Chloride 06/23/2020 108* 96 - 106 mmol/L Final   • Total CO2 06/23/2020 23  20 - 29 mmol/L " Final   • Calcium 06/23/2020 9.2  8.7 - 10.2 mg/dL Final   • Total Protein 06/23/2020 7.0  6.0 - 8.5 g/dL Final   • Albumin 06/23/2020 4.4  3.8 - 4.9 g/dL Final   • Globulin 06/23/2020 2.6  1.5 - 4.5 g/dL Final   • A/G Ratio 06/23/2020 1.7  1.2 - 2.2 Final   • Total Bilirubin 06/23/2020 <0.2  0.0 - 1.2 mg/dL Final   • Alkaline Phosphatase 06/23/2020 75  39 - 117 IU/L Final   • AST (SGOT) 06/23/2020 15  0 - 40 IU/L Final   • ALT (SGPT) 06/23/2020 9  0 - 32 IU/L Final   • WBC 06/23/2020 5.2  3.4 - 10.8 x10E3/uL Final   • RBC 06/23/2020 3.91  3.77 - 5.28 x10E6/uL Final   • Hemoglobin 06/23/2020 11.5  11.1 - 15.9 g/dL Final   • Hematocrit 06/23/2020 35.9  34.0 - 46.6 % Final   • MCV 06/23/2020 92  79 - 97 fL Final   • MCH 06/23/2020 29.4  26.6 - 33.0 pg Final   • MCHC 06/23/2020 32.0  31.5 - 35.7 g/dL Final   • RDW 06/23/2020 14.2  11.7 - 15.4 % Final   • Platelets 06/23/2020 192  150 - 450 x10E3/uL Final   • Neutrophil Rel % 06/23/2020 61  Not Estab. % Final   • Lymphocyte Rel % 06/23/2020 25  Not Estab. % Final   • Monocyte Rel % 06/23/2020 10  Not Estab. % Final   • Eosinophil Rel % 06/23/2020 2  Not Estab. % Final   • Basophil Rel % 06/23/2020 1  Not Estab. % Final   • Neutrophils Absolute 06/23/2020 3.2  1.4 - 7.0 x10E3/uL Final   • Lymphocytes Absolute 06/23/2020 1.3  0.7 - 3.1 x10E3/uL Final   • Monocytes Absolute 06/23/2020 0.5  0.1 - 0.9 x10E3/uL Final   • Eosinophils Absolute 06/23/2020 0.1  0.0 - 0.4 x10E3/uL Final   • Basophils Absolute 06/23/2020 0.0  0.0 - 0.2 x10E3/uL Final   • Immature Granulocyte Rel % 06/23/2020 1  Not Estab. % Final   • Immature Grans Absolute 06/23/2020 0.0  0.0 - 0.1 x10E3/uL Final   • 25 Hydroxy, Vitamin D 06/23/2020 21.9* 30.0 - 100.0 ng/mL Final    Comment: Vitamin D deficiency has been defined by the [place] [employer]  [employer] and an Endocrine Society practice guideline as a  level of serum 25-OH vitamin D less than 20 ng/mL (1,2).  The Endocrine Society went on to further define  vitamin D  insufficiency as a level between 21 and 29 ng/mL (2).  1. IOM ([place] of [employer]). 2010. Dietary reference     intakes for calcium and D. [place]: The     National Academies Press.  2. Alejandrina MF, Sergo ANTON, Beth ROGERS, et al.     Evaluation, treatment, and prevention of vitamin D     deficiency: an Endocrine Society clinical practice     guideline. JCEM. 2011 Jul; 96(7):1911-30.     • Vitamin B-12 06/23/2020 1,427* 232 - 1,245 pg/mL Final   • TSH 06/23/2020 2.300  0.450 - 4.500 uIU/mL Final   • Free T4 06/23/2020 1.19  0.82 - 1.77 ng/dL Final   • Sed Rate 06/23/2020 2  0 - 40 mm/hr Final     Result Review :                       Assessment and Plan    Diagnoses and all orders for this visit:    1. Annual physical exam (Primary)    2. Benign essential hypertension    3. Essential hypertension    4. Acquired hypothyroidism    5. Vitamin D deficiency  -     cholecalciferol (VITAMIN D3) 1.25 MG (05932 UT) capsule; Take once monthly  Dispense: 10 capsule; Refill: 3    6. Seasonal allergic rhinitis due to pollen    7. Need for hepatitis C screening test  -     Hepatitis C Antibody    8. Systemic lupus erythematosus, unspecified SLE type, unspecified organ involvement status (CMS/HCC)    9. Kidney disease, chronic, stage II (GFR 60-89 ml/min)    We have called for colonoscopy report from [facility].  Patient reports Tdap [facility] January 2019.    She will check with insurance for coverage of zoster vaccine.    Patient has follow-up with cardiologist, Dr. Davies, at [facility] in May, reports he is following her cholesterol.    Patient keep follow-up with rheumatology, cardiology, nephrology, neurology, gynecology and schedule appointment with gastroenterology.    Did encourage to schedule for Covid 19 vaccination    Medications Discontinued During This Encounter   Medication Reason   • doxycycline (DORYX) 100 MG enteric coated tablet *Therapy completed   • cyanocobalamin 1000  MCG/ML injection *Therapy completed   • cholecalciferol (VITAMIN D3) 1.25 MG (30873 UT) capsule          Follow Up     Return in about 1 year (around 3/22/2022) for Annual physical or as needed.    Patient was given instructions and counseling regarding her condition or for health maintenance advice. Please see specific information pulled into the AVS if appropriate.

## 2021-03-23 LAB — HCV AB S/CO SERPL IA: <0.1 S/CO RATIO (ref 0–0.9)

## 2021-03-25 ENCOUNTER — TELEPHONE (OUTPATIENT)
Dept: FAMILY MEDICINE CLINIC | Facility: CLINIC | Age: 56
End: 2021-03-25

## 2021-03-26 NOTE — TELEPHONE ENCOUNTER
Caller: Tasneem Valentino    Relationship to patient: Self    Best call back number: [phone]    Patient is needing: PATIENT RETURNED CALL    HUB READ HUB TO READ MESSAGE    PATIENT VOICED UNDERSTANDING AND HAD NO FURTHER QUESTIONS

## 2021-04-21 ENCOUNTER — PATIENT MESSAGE (OUTPATIENT)
Dept: FAMILY MEDICINE CLINIC | Facility: CLINIC | Age: 56
End: 2021-04-21

## 2021-04-22 RX ORDER — OMEPRAZOLE 20 MG/1
20 CAPSULE, DELAYED RELEASE ORAL DAILY
Qty: 90 CAPSULE | Refills: 3 | Status: SHIPPED | OUTPATIENT
Start: 2021-04-22 | End: 2022-05-11 | Stop reason: SDUPTHER

## 2021-04-22 NOTE — TELEPHONE ENCOUNTER
From: Tasneem Valentino  To: Ema Moore MD  Sent: 4/21/2021 8:03 PM EDT  Subject: Prescription Question    Request refill on omeprazole 20 mg daily from [facility] pharmacy. Thank you!

## 2021-04-29 ENCOUNTER — OFFICE VISIT (OUTPATIENT)
Dept: PAIN MEDICINE | Facility: CLINIC | Age: 56
End: 2021-04-29

## 2021-04-29 VITALS
WEIGHT: 140 LBS | BODY MASS INDEX: 21.22 KG/M2 | SYSTOLIC BLOOD PRESSURE: 106 MMHG | DIASTOLIC BLOOD PRESSURE: 69 MMHG | OXYGEN SATURATION: 99 % | HEART RATE: 66 BPM | RESPIRATION RATE: 16 BRPM | TEMPERATURE: 97.3 F | HEIGHT: 68 IN

## 2021-04-29 DIAGNOSIS — M48.02 NEUROFORAMINAL STENOSIS OF CERVICAL SPINE: ICD-10-CM

## 2021-04-29 DIAGNOSIS — M54.12 CERVICAL RADICULOPATHY: ICD-10-CM

## 2021-04-29 DIAGNOSIS — M25.50 PAIN IN JOINT INVOLVING MULTIPLE SITES: ICD-10-CM

## 2021-04-29 DIAGNOSIS — M54.2 CERVICALGIA: Primary | ICD-10-CM

## 2021-04-29 DIAGNOSIS — M47.812 CERVICAL SPONDYLOSIS WITHOUT MYELOPATHY: ICD-10-CM

## 2021-04-29 PROCEDURE — 99213 OFFICE O/P EST LOW 20 MIN: CPT | Performed by: PHYSICAL MEDICINE & REHABILITATION

## 2021-04-29 RX ORDER — HYDROCODONE BITARTRATE AND ACETAMINOPHEN 10; 325 MG/1; MG/1
1 TABLET ORAL EVERY 6 HOURS PRN
Qty: 120 TABLET | Refills: 0 | Status: SHIPPED | OUTPATIENT
Start: 2021-04-29 | End: 2021-07-22 | Stop reason: SDUPTHER

## 2021-04-29 NOTE — PROGRESS NOTES
Subjective   Tasneem Valentino is a 56 y.o. female.     Chronic daily headaches, also widespread joint pain with SLE, 10/10 at worst, 1/10 at best, always present, varies, aching, stabbing, worse with weather changes, interferes with ADLs, sleep, failed chiropractor, PT, massage, occipital neurotomy, headaches stim trial. MRI brain wnl. Prior notes reviewed, as above, was seeing Dr. Laguerre with Norco 10mg QID prn and Imitrex 100mg #9/month with some relief, also started Amovig 70mg, uses Zofran 4mg prn. No FH of substance abuse. 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 Had 3 cervical ESIs, symptoms essentially resolved after 1st, improved after 2nd, weakness worsening with a lot of lifting at work.        The following portions of the patient's history were reviewed and updated as appropriate: allergies, current medications, past family history, past medical history, past social history, past surgical history and problem list.    Review of Systems   Constitutional: Negative for chills, fatigue and fever.   HENT: Positive for hearing loss. Negative for trouble swallowing.    Eyes: Positive for visual disturbance.   Respiratory: Negative for shortness of breath.    Cardiovascular: Negative for chest pain.   Gastrointestinal: Positive for constipation. Negative for abdominal pain, diarrhea, nausea and vomiting.   Genitourinary: Negative for urinary incontinence.   Musculoskeletal: Negative for arthralgias, back pain, joint swelling, myalgias and neck pain.   Neurological: Positive for dizziness and headache. Negative for weakness and numbness.       Objective   Physical Exam   Constitutional: She is oriented to person, place, and time. She appears well-developed and well-nourished.   HENT:   Head: Normocephalic and atraumatic.   Eyes: Pupils are equal, round, and reactive to light.   Cardiovascular: Normal rate and regular rhythm.    Murmur heard.  Pulmonary/Chest: Breath sounds normal.   Abdominal: Soft. Bowel sounds are normal. She exhibits no distension. There is no abdominal tenderness.   Neurological: She is alert and oriented to person, place, and time. She has normal reflexes. She displays normal reflexes. No sensory deficit.   Psychiatric: Her behavior is normal. Thought content normal.         Assessment/Plan   Diagnoses and all orders for this visit:    1. Cervicalgia (Primary)    2. Pain in joint involving multiple sites    3. Cervical radiculopathy    4. Cervical spondylosis without myelopathy    5. Neuroforaminal stenosis of cervical spine        UDS in order 10/27/20. Inspect reviewed, in order.  Treatment plan will consist of continuing current medication as long as it remains effective and is necessary, while evaluating patient at each visit and determining if the medication can be lowered or discontinued, while also using nonopioid therapies to reduce reliance on opioids.  Cont Norco 10mg QID prn, Imitrex 100mg #9.  Began zofran 4mg TID prn.  Had 3 cervical ESIs per MRI. No contrast per allergy. Some discomfort with left paramedian approach, will perform on right in future.  Failed headache procedures.  RTC in 3 months for f/u.

## 2021-05-03 ENCOUNTER — TELEPHONE (OUTPATIENT)
Dept: FAMILY MEDICINE CLINIC | Facility: CLINIC | Age: 56
End: 2021-05-03

## 2021-05-03 RX ORDER — SUMATRIPTAN 100 MG/1
TABLET, FILM COATED ORAL
Qty: 9 TABLET | Refills: 2 | Status: SHIPPED | OUTPATIENT
Start: 2021-05-03 | End: 2021-07-22 | Stop reason: SDUPTHER

## 2021-05-03 NOTE — TELEPHONE ENCOUNTER
Lexi with [employer] called on patient, they are wanting the nurse to call and go over a few things   Reference number- [id number]

## 2021-07-22 ENCOUNTER — OFFICE VISIT (OUTPATIENT)
Dept: PAIN MEDICINE | Facility: CLINIC | Age: 56
End: 2021-07-22

## 2021-07-22 VITALS
OXYGEN SATURATION: 99 % | HEART RATE: 60 BPM | RESPIRATION RATE: 16 BRPM | WEIGHT: 140 LBS | BODY MASS INDEX: 21.22 KG/M2 | HEIGHT: 68 IN

## 2021-07-22 DIAGNOSIS — M54.2 CERVICALGIA: Primary | ICD-10-CM

## 2021-07-22 DIAGNOSIS — M47.812 CERVICAL SPONDYLOSIS WITHOUT MYELOPATHY: ICD-10-CM

## 2021-07-22 DIAGNOSIS — M25.50 PAIN IN JOINT INVOLVING MULTIPLE SITES: ICD-10-CM

## 2021-07-22 DIAGNOSIS — M54.12 CERVICAL RADICULOPATHY: ICD-10-CM

## 2021-07-22 DIAGNOSIS — M48.02 NEUROFORAMINAL STENOSIS OF CERVICAL SPINE: ICD-10-CM

## 2021-07-22 DIAGNOSIS — R51.9 CHRONIC DAILY HEADACHE: ICD-10-CM

## 2021-07-22 PROCEDURE — 99213 OFFICE O/P EST LOW 20 MIN: CPT | Performed by: PHYSICAL MEDICINE & REHABILITATION

## 2021-07-22 RX ORDER — HYDROCODONE BITARTRATE AND ACETAMINOPHEN 10; 325 MG/1; MG/1
1 TABLET ORAL EVERY 6 HOURS PRN
Qty: 120 TABLET | Refills: 0 | Status: SHIPPED | OUTPATIENT
Start: 2021-07-22 | End: 2021-10-21 | Stop reason: SDUPTHER

## 2021-07-22 RX ORDER — SUMATRIPTAN 100 MG/1
100 TABLET, FILM COATED ORAL SEE ADMIN INSTRUCTIONS
Qty: 9 TABLET | Refills: 2 | Status: SHIPPED | OUTPATIENT
Start: 2021-07-22 | End: 2021-09-23

## 2021-07-22 NOTE — PROGRESS NOTES
Subjective   Tasneem Valentino is a 56 y.o. female.     Chronic daily headaches, also widespread joint pain with SLE, 10/10 at worst, 1/10 at best, always present, varies, aching, stabbing, worse with weather changes, interferes with ADLs, sleep, failed chiropractor, PT, massage, occipital neurotomy, headaches stim trial. MRI brain wnl. Prior notes reviewed, as above, was seeing Dr. Lgauerre with Norco 10mg QID prn and Imitrex 100mg #9/month with some relief, also started Amovig 70mg, uses Zofran 4mg prn. No FH of substance abuse. 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 Had 3 cervical ESIs, symptoms essentially resolved after 1st, improved after 2nd, weakness worsening with a lot of lifting at work.        The following portions of the patient's history were reviewed and updated as appropriate: allergies, current medications, past family history, past medical history, past social history, past surgical history and problem list.    Review of Systems   Constitutional: Negative for chills, fatigue and fever.   HENT: Positive for hearing loss. Negative for trouble swallowing.    Eyes: Positive for visual disturbance.   Respiratory: Negative for shortness of breath.    Cardiovascular: Negative for chest pain.   Gastrointestinal: Positive for constipation. Negative for abdominal pain, diarrhea, nausea and vomiting.   Genitourinary: Negative for urinary incontinence.   Musculoskeletal: Negative for arthralgias, back pain, joint swelling, myalgias and neck pain.   Neurological: Positive for dizziness and headache. Negative for weakness and numbness.       Objective   Physical Exam   Constitutional: She is oriented to person, place, and time. She appears well-developed and well-nourished.   HENT:   Head: Normocephalic and atraumatic.   Eyes: Pupils are equal, round, and reactive to light.   Cardiovascular: Normal rate and regular rhythm.    Murmur heard.  Pulmonary/Chest: Breath sounds normal.   Abdominal: Soft. Bowel sounds are normal. She exhibits no distension. There is no abdominal tenderness.   Neurological: She is alert and oriented to person, place, and time. She has normal reflexes. She displays normal reflexes. No sensory deficit.   Psychiatric: Her behavior is normal. Thought content normal.         Assessment/Plan   Diagnoses and all orders for this visit:    1. Cervicalgia (Primary)    2. Cervical radiculopathy    3. Cervical spondylosis without myelopathy    4. Chronic daily headache    5. Pain in joint involving multiple sites    6. Neuroforaminal stenosis of cervical spine        UDS in order 10/27/20. Inspect reviewed, in order.  Treatment plan will consist of continuing current medication as long as it remains effective and is necessary, while evaluating patient at each visit and determining if the medication can be lowered or discontinued, while also using nonopioid therapies to reduce reliance on opioids.  Cont Norco 10mg QID prn, Imitrex 100mg #9.  Began zofran 4mg TID prn.  Had 3 cervical ESIs per MRI. No contrast per allergy. Some discomfort with left paramedian approach, will perform on right in future.  Failed headache procedures.  RTC in 3 months for f/u.

## 2021-07-28 ENCOUNTER — TELEPHONE (OUTPATIENT)
Dept: FAMILY MEDICINE CLINIC | Facility: CLINIC | Age: 56
End: 2021-07-28

## 2021-07-28 DIAGNOSIS — U07.1 COVID-19 VIRUS INFECTION: Primary | ICD-10-CM

## 2021-07-28 RX ORDER — PREDNISONE 20 MG/1
40 TABLET ORAL DAILY
Qty: 10 TABLET | Refills: 0 | Status: SHIPPED | OUTPATIENT
Start: 2021-07-28 | End: 2021-08-20

## 2021-07-28 NOTE — TELEPHONE ENCOUNTER
Please inform patient that while Covid is a virus and there are not any great antiviral treatments that are recommended to be used as an outpatient I can prescribe prednisone, one of the medicines that Dr. Parker prescribed for your  and see if steroids can help improve symptoms.      Unfortunately, macrolides (azithromycin and Biaxin) the only antibiotics that have been recommended for Covid related bronchitis or pneumonia are contraindicated while taking Cardizem.      Of course, other recommended treatment is to stay hydrated, drink plenty of fluids, use over-the-counter Tylenol as needed for the fever, sore throat, body aches, headache etc. ibuprofen is less recommended but can use that as well.  And of course if symptoms become severe, especially shortness of breath do recommend seeking appropriate medical care through the emergency department.

## 2021-07-28 NOTE — TELEPHONE ENCOUNTER
Patient has tested positive for COVID.  She had her test done at [facility] since she works there.  Symptoms: sore throat, nasal drainage, nausea, diarrhea, HA, fever, body aches.  Her  has also tested positive on Monday but is being treated by Dr. Parker.    She is requesting medication.

## 2021-08-09 ENCOUNTER — TELEPHONE (OUTPATIENT)
Dept: PAIN MEDICINE | Facility: CLINIC | Age: 56
End: 2021-08-09

## 2021-08-09 RX ORDER — ONDANSETRON 4 MG/1
4 TABLET, FILM COATED ORAL EVERY 8 HOURS PRN
Qty: 90 TABLET | Refills: 11 | Status: SHIPPED | OUTPATIENT
Start: 2021-08-09 | End: 2023-04-03 | Stop reason: SDUPTHER

## 2021-08-20 ENCOUNTER — OFFICE VISIT (OUTPATIENT)
Dept: FAMILY MEDICINE CLINIC | Facility: CLINIC | Age: 56
End: 2021-08-20

## 2021-08-20 VITALS
SYSTOLIC BLOOD PRESSURE: 108 MMHG | HEART RATE: 78 BPM | WEIGHT: 144 LBS | TEMPERATURE: 98 F | RESPIRATION RATE: 18 BRPM | BODY MASS INDEX: 21.82 KG/M2 | OXYGEN SATURATION: 97 % | HEIGHT: 68 IN | DIASTOLIC BLOOD PRESSURE: 67 MMHG

## 2021-08-20 DIAGNOSIS — G43.709 CHRONIC MIGRAINE WITHOUT AURA WITHOUT STATUS MIGRAINOSUS, NOT INTRACTABLE: ICD-10-CM

## 2021-08-20 DIAGNOSIS — U07.1 COVID-19 VIRUS INFECTION: ICD-10-CM

## 2021-08-20 DIAGNOSIS — J32.9 SINUSITIS, UNSPECIFIED CHRONICITY, UNSPECIFIED LOCATION: Primary | ICD-10-CM

## 2021-08-20 PROCEDURE — 99214 OFFICE O/P EST MOD 30 MIN: CPT | Performed by: FAMILY MEDICINE

## 2021-08-20 RX ORDER — ERENUMAB-AOOE 70 MG/ML
70 INJECTION SUBCUTANEOUS
Qty: 1 PEN | Refills: 12 | Status: SHIPPED | OUTPATIENT
Start: 2021-08-20 | End: 2021-11-08

## 2021-08-20 RX ORDER — AMOXICILLIN AND CLAVULANATE POTASSIUM 500; 125 MG/1; MG/1
1 TABLET, FILM COATED ORAL 2 TIMES DAILY WITH MEALS
Qty: 20 TABLET | Refills: 0 | Status: SHIPPED | OUTPATIENT
Start: 2021-08-20 | End: 2021-11-08

## 2021-08-20 NOTE — PROGRESS NOTES
Chief Complaint  Sinusitis      Subjective            History of Present Illness     Tasneem Valentino presents today for sinusitis.    Patient states she was tested positive for Covid July 28. Patient states she has symptoms that started on July 24. In bed for most of 3 weeks, just started back at work on Saturday, 6 days ago.  Patient states she can not get rid of the sinus issues. She states she has a lot of tenderness in the right sinus cavity. She states she has a little in the left side. Patient is complaining of green drainage in the back of her throat. The drainage clears up in the afternoon but gets worse again in the morning and evenings. Patient states she is using flonase and saline sprays. She also takes benadryl and zyrtec daily.  Nasal steroids helped a little. No antibiotics.    Current Outpatient Medications on File Prior to Visit   Medication Sig   • carvedilol (COREG) 3.125 MG tablet Take 3.125 mg by mouth 2 (Two) Times a Day With Meals.   • Cetirizine HCl 10 MG capsule Take 10 mg by mouth Daily.   • cholecalciferol (VITAMIN D3) 1.25 MG (60425 UT) capsule Take once monthly   • coenzyme Q10 100 MG capsule Take 100 mg by mouth Daily.   • dilTIAZem CD (CARDIZEM CD) 360 MG 24 hr capsule Take 360 mg by mouth Daily.   • diphenhydrAMINE (BENADRYL ALLERGY) 25 MG tablet Take 25 mg by mouth At Night As Needed.   • fluticasone (Flonase Allergy Relief) 50 MCG/ACT nasal spray 2 sprays into the nostril(s) as directed by provider As Needed for Rhinitis.   • HYDROcodone-acetaminophen (Norco)  MG per tablet Take 1 tablet by mouth Every 6 (Six) Hours As Needed for Moderate Pain .   • hydroxychloroquine (PLAQUENIL) 200 MG tablet Take 200 mg by mouth 2 (Two) Times a Day.   • Magnesium 500 MG capsule Take 500 mg by mouth Daily.   • meclizine (ANTIVERT) 25 MG tablet Take 25 mg by mouth 3 (Three) Times a Day As Needed.   • Melatonin 5 MG capsule Take 5 mg by mouth Every Night.   • omeprazole (priLOSEC) 20 MG capsule  "Take 1 capsule by mouth Daily.   • ondansetron (ZOFRAN) 4 MG tablet Take 1 tablet by mouth Every 8 (Eight) Hours As Needed for Nausea or Vomiting.   • sertraline (ZOLOFT) 50 MG tablet 1/2 in am 1 at hs   • SUMAtriptan (IMITREX) 100 MG tablet Take 1 tablet by mouth See Admin Instructions for 1 dose. Take one tablet at onset of headache. May repeat dose one time in 2 hours if headache not relieved.   • Synthroid 75 MCG tablet Take 1 tablet by mouth Daily.   • topiramate (TOPAMAX) 100 MG tablet Take 1 tablet by mouth 2 (Two) Times a Day.   • HYDROcodone-acetaminophen (Norco)  MG per tablet Take 1 tablet by mouth Every 6 (Six) Hours As Needed for Moderate Pain .   • HYDROcodone-acetaminophen (Norco)  MG per tablet Take 1 tablet by mouth Every 6 (Six) Hours As Needed for Moderate Pain .   • nitroglycerin (NITROSTAT) 0.4 MG SL tablet Nitrostat 0.4 mg sublingual tablet   TAKE AS DIRECTED     No current facility-administered medications on file prior to visit.       Objective   Vital Signs:   /67 (BP Location: Right arm, Patient Position: Sitting, Cuff Size: Adult)   Pulse 78   Temp 98 °F (36.7 °C) (Infrared)   Resp 18   Ht 172.7 cm (68\")   Wt 65.3 kg (144 lb)   SpO2 97%   BMI 21.90 kg/m²     Physical Exam  Vitals and nursing note reviewed.   Constitutional:       General: She is not in acute distress.     Appearance: She is well-developed.   HENT:      Head: Normocephalic and atraumatic.      Comments: There is tenderness palpation of the right frontal and maxillary sinuses.  No significant tenderness on the left.  There is cobblestoning of the posterior pharynx and some nasal mucosal bogginess  Eyes:      Extraocular Movements: Extraocular movements intact.      Conjunctiva/sclera: Conjunctivae normal.      Pupils: Pupils are equal, round, and reactive to light.   Cardiovascular:      Rate and Rhythm: Normal rate and regular rhythm.      Heart sounds: No murmur heard.     Pulmonary:      Effort: " Pulmonary effort is normal.      Breath sounds: Normal breath sounds. No wheezing.   Musculoskeletal:         General: Normal range of motion.   Skin:     General: Skin is warm and dry.      Findings: No rash.   Neurological:      Mental Status: She is alert and oriented to person, place, and time.            No visits with results within 1 Day(s) from this visit.   Latest known visit with results is:   Office Visit on 03/22/2021   Component Date Value Ref Range Status   • Hep C Virus Ab 03/22/2021 <0.1  0.0 - 0.9 s/co ratio Final    Comment:                                   Negative:     < 0.8                               Indeterminate: 0.8 - 0.9                                    Positive:     > 0.9   The CDC recommends that a positive HCV antibody result   be followed up with a HCV Nucleic Acid Amplification   test ([id number]).               No results found for: HGBA1C             Assessment and Plan    Diagnoses and all orders for this visit:    1. Sinusitis, subacute right frontal and maxillary (Primary)  -     amoxicillin-clavulanate (Augmentin) 500-125 MG per tablet; Take 1 tablet by mouth 2 (Two) Times a Day With Meals.  Dispense: 20 tablet; Refill: 0    2. Chronic migraine without aura without status migrainosus, not intractable  -     Erenumab-aooe (Aimovig) 70 MG/ML prefilled syringe; Inject 1 mL under the skin into the appropriate area as directed Every 30 (Thirty) Days.  Dispense: 1 pen; Refill: 12    3. COVID-19 virus infection  Comments:  Positive test July 28, 2021      Right frontal and maxillary sinusitis following Covid infection less than 1 month ago.  (Both of these are new problem to myself) prescription for Augmentin (avoiding macrolides due to also taking diltiazem) continue Flonase, saline spray.  Renewing Aimovig.      Medications Discontinued During This Encounter   Medication Reason   • estradiol (ESTRACE) 0.5 MG tablet *Therapy completed   • predniSONE (DELTASONE) 20 MG tablet *Therapy  completed   • progesterone (PROMETRIUM) 100 MG capsule *Therapy completed   • Aimovig 70 MG/ML prefilled syringe Reorder         Follow Up     Return if symptoms worsen or fail to improve.    Patient was given instructions and counseling regarding her condition or for health maintenance advice. Please see specific information pulled into the AVS if appropriate.

## 2021-09-10 ENCOUNTER — TELEPHONE (OUTPATIENT)
Dept: FAMILY MEDICINE CLINIC | Facility: CLINIC | Age: 56
End: 2021-09-10

## 2021-09-10 NOTE — TELEPHONE ENCOUNTER
Caller: [id number] - [place], [place] - [address] [place] [facility] [address] [facility] [address] [facility] - [phone]  - [phone] FX    Relationship to patient: Pharmacy    Best call back number:[phone]    Patient is needing:PHARMACY CALLED STATING PRIOR AUTHORIZATION IS NEEDED ON THE Erenumab-aooe (Aimovig) 70 MG/ML prefilled syringe. PLEASE ADVISE. THANK YOU.

## 2021-09-10 NOTE — TELEPHONE ENCOUNTER
PATIENT CALLING WANTING TO FOLLOW UP ON THE PRIOR AUTH SHE STATED THE INSURANCE COMPANY TOLD HER ALL THAT'S NEEDED IS TO STATE THE MEDICATION IS FOR MIGRAINES.    PLEASE ADVISE  [phone]

## 2021-09-23 RX ORDER — SUMATRIPTAN 100 MG/1
TABLET, FILM COATED ORAL
Qty: 9 TABLET | Refills: 2 | Status: SHIPPED | OUTPATIENT
Start: 2021-09-23 | End: 2021-10-21 | Stop reason: SDUPTHER

## 2021-10-21 ENCOUNTER — OFFICE VISIT (OUTPATIENT)
Dept: PAIN MEDICINE | Facility: CLINIC | Age: 56
End: 2021-10-21

## 2021-10-21 VITALS
BODY MASS INDEX: 21.82 KG/M2 | HEIGHT: 68 IN | SYSTOLIC BLOOD PRESSURE: 140 MMHG | HEART RATE: 61 BPM | RESPIRATION RATE: 16 BRPM | OXYGEN SATURATION: 98 % | WEIGHT: 144 LBS | DIASTOLIC BLOOD PRESSURE: 70 MMHG

## 2021-10-21 DIAGNOSIS — Z79.899 HIGH RISK MEDICATION USE: Primary | ICD-10-CM

## 2021-10-21 DIAGNOSIS — M48.02 NEUROFORAMINAL STENOSIS OF CERVICAL SPINE: ICD-10-CM

## 2021-10-21 DIAGNOSIS — M54.12 CERVICAL RADICULOPATHY: ICD-10-CM

## 2021-10-21 DIAGNOSIS — M25.50 PAIN IN JOINT INVOLVING MULTIPLE SITES: ICD-10-CM

## 2021-10-21 DIAGNOSIS — M47.812 CERVICAL SPONDYLOSIS WITHOUT MYELOPATHY: ICD-10-CM

## 2021-10-21 DIAGNOSIS — R51.9 CHRONIC DAILY HEADACHE: ICD-10-CM

## 2021-10-21 DIAGNOSIS — M54.2 CERVICALGIA: Primary | ICD-10-CM

## 2021-10-21 PROCEDURE — 99213 OFFICE O/P EST LOW 20 MIN: CPT | Performed by: PHYSICAL MEDICINE & REHABILITATION

## 2021-10-21 RX ORDER — HYDROCODONE BITARTRATE AND ACETAMINOPHEN 10; 325 MG/1; MG/1
1 TABLET ORAL EVERY 6 HOURS PRN
Qty: 120 TABLET | Refills: 0 | Status: SHIPPED | OUTPATIENT
Start: 2021-10-21 | End: 2021-11-08 | Stop reason: SDUPTHER

## 2021-10-21 RX ORDER — HYDROCODONE BITARTRATE AND ACETAMINOPHEN 10; 325 MG/1; MG/1
1 TABLET ORAL EVERY 6 HOURS PRN
Qty: 120 TABLET | Refills: 0 | Status: SHIPPED | OUTPATIENT
Start: 2021-10-21 | End: 2022-01-10 | Stop reason: SDUPTHER

## 2021-10-21 RX ORDER — SUMATRIPTAN 100 MG/1
100 TABLET, FILM COATED ORAL
Qty: 9 TABLET | Refills: 3 | Status: SHIPPED | OUTPATIENT
Start: 2021-10-21 | End: 2022-01-03

## 2021-10-21 NOTE — PROGRESS NOTES
Subjective   Tasneem Valentino is a 56 y.o. female.     Chronic daily headaches, also widespread joint pain with SLE, 10/10 at worst, 1/10 at best, always present, varies, aching, stabbing, worse with weather changes, interferes with ADLs, sleep, failed chiropractor, PT, massage, occipital neurotomy, headaches stim trial. MRI brain wnl. Prior notes reviewed, as above, was seeing Dr. Laguerre with Norco 10mg QID prn and Imitrex 100mg #9/month with some relief, also started Amovig 70mg, uses Zofran 4mg prn. No FH of substance abuse. 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 Had 3 cervical ESIs, symptoms essentially resolved after 1st, improved after 2nd, weakness worsening with a lot of lifting at work.        The following portions of the patient's history were reviewed and updated as appropriate: allergies, current medications, past family history, past medical history, past social history, past surgical history and problem list.    Review of Systems   Constitutional: Negative for chills, fatigue and fever.   HENT: Positive for hearing loss. Negative for trouble swallowing.    Eyes: Positive for visual disturbance.   Respiratory: Negative for shortness of breath.    Cardiovascular: Negative for chest pain.   Gastrointestinal: Positive for constipation. Negative for abdominal pain, diarrhea, nausea and vomiting.   Genitourinary: Negative for urinary incontinence.   Musculoskeletal: Negative for arthralgias, back pain, joint swelling, myalgias and neck pain.   Neurological: Positive for dizziness and headache. Negative for weakness and numbness.       Objective   Physical Exam   Constitutional: She is oriented to person, place, and time. She appears well-developed and well-nourished.   HENT:   Head: Normocephalic and atraumatic.   Eyes: Pupils are equal, round, and reactive to light.   Cardiovascular: Normal rate and regular rhythm.    Murmur heard.  Pulmonary/Chest: Breath sounds normal.   Abdominal: Soft. Bowel sounds are normal. She exhibits no distension. There is no abdominal tenderness.   Neurological: She is alert and oriented to person, place, and time. She has normal reflexes. She displays normal reflexes. No sensory deficit.   Psychiatric: Her behavior is normal. Thought content normal.         Assessment/Plan   Diagnoses and all orders for this visit:    1. Cervicalgia (Primary)    2. Cervical spondylosis without myelopathy    3. Cervical radiculopathy    4. Chronic daily headache    5. Neuroforaminal stenosis of cervical spine    6. Pain in joint involving multiple sites        UDS in order 10/27/20. Inspect reviewed, in order.  Treatment plan will consist of continuing current medication as long as it remains effective and is necessary, while evaluating patient at each visit and determining if the medication can be lowered or discontinued, while also using nonopioid therapies to reduce reliance on opioids.  Cont Norco 10mg QID prn, Imitrex 100mg #9.  Began zofran 4mg TID prn.  Had 3 cervical ESIs per MRI. No contrast per allergy. Some discomfort with left paramedian approach, will perform on right in future.  Failed headache procedures.  RTC in 3 months for f/u.

## 2021-11-08 ENCOUNTER — OFFICE VISIT (OUTPATIENT)
Dept: FAMILY MEDICINE CLINIC | Facility: CLINIC | Age: 56
End: 2021-11-08

## 2021-11-08 VITALS
OXYGEN SATURATION: 99 % | WEIGHT: 153 LBS | BODY MASS INDEX: 23.19 KG/M2 | HEART RATE: 67 BPM | SYSTOLIC BLOOD PRESSURE: 123 MMHG | RESPIRATION RATE: 16 BRPM | DIASTOLIC BLOOD PRESSURE: 78 MMHG | HEIGHT: 68 IN | TEMPERATURE: 98 F

## 2021-11-08 DIAGNOSIS — J06.9 UPPER RESPIRATORY TRACT INFECTION, UNSPECIFIED TYPE: Primary | ICD-10-CM

## 2021-11-08 DIAGNOSIS — R49.0 HOARSENESS OF VOICE: ICD-10-CM

## 2021-11-08 DIAGNOSIS — R09.81 SINUS CONGESTION: ICD-10-CM

## 2021-11-08 PROCEDURE — 99213 OFFICE O/P EST LOW 20 MIN: CPT | Performed by: NURSE PRACTITIONER

## 2021-11-08 RX ORDER — AMOXICILLIN AND CLAVULANATE POTASSIUM 875; 125 MG/1; MG/1
1 TABLET, FILM COATED ORAL 2 TIMES DAILY
Qty: 20 TABLET | Refills: 0 | Status: SHIPPED | OUTPATIENT
Start: 2021-11-08 | End: 2022-02-10

## 2021-11-08 RX ORDER — METHYLPREDNISOLONE 4 MG/1
TABLET ORAL
Qty: 21 EACH | Refills: 0 | Status: SHIPPED | OUTPATIENT
Start: 2021-11-08 | End: 2021-11-13

## 2021-11-08 NOTE — ASSESSMENT & PLAN NOTE
Encourage fluids, rest, food as tolerated, finish antibiotic and Medrol pack.  Patient to follow-up if no improvement in symptoms or they worsen.

## 2021-11-08 NOTE — PROGRESS NOTES
"Chief Complaint  Establish Care, Nasal Congestion (Patient states it started on Saturday), and Sore Throat    Subjective          Tasneem Valentino presents to [facility]     56-year-old female patient presents today with nasal congestion, sore throat, and hoarse voice. She reports that she has sinus issues 2-4 times a year.  She reports that aside from the weather change in harvest season her  recently brought for stray cats home.  She reports she is not used to so many animals in the home is wondering if maybe she has an allergy to these animals.  Patient reports that she has had the symptoms for over a week and they recently got worse over the weekend.  She denies shortness of breath.  She reports low-grade temperatures of 99 which she takes Tylenol for.  She reports a hoarse voice and yellow-green nasal drainage.  She denies any other sick contacts in the home.  She reports she has taken Tylenol, Mucinex, and fluids increased.         Objective   Vital Signs:   /78 (BP Location: Right arm, Patient Position: Sitting, Cuff Size: Adult)   Pulse 67   Temp 98 °F (36.7 °C) (Infrared)   Resp 16   Ht 172.7 cm (67.99\")   Wt 69.4 kg (153 lb)   SpO2 99%   BMI 23.27 kg/m²     Physical Exam  Constitutional:       Appearance: Normal appearance. She is well-developed.      Comments: Wearing a face mask     HENT:      Head: Normocephalic and atraumatic.      Right Ear: Tympanic membrane, ear canal and external ear normal.      Left Ear: Tympanic membrane, ear canal and external ear normal.      Ears:      Comments: Bilateral haring aides present and removed for ear exam       Nose: Congestion and rhinorrhea present. No nasal tenderness. Rhinorrhea is purulent.      Right Turbinates: Enlarged and swollen.      Left Turbinates: Enlarged and swollen.      Right Sinus: No maxillary sinus tenderness or frontal sinus tenderness.      Left Sinus: No maxillary sinus tenderness or " frontal sinus tenderness.      Mouth/Throat:      Mouth: Mucous membranes are moist.      Pharynx: Oropharynx is clear. Posterior oropharyngeal erythema present. No oropharyngeal exudate.   Eyes:      Conjunctiva/sclera: Conjunctivae normal.   Cardiovascular:      Rate and Rhythm: Normal rate and regular rhythm.      Pulses: Normal pulses.      Heart sounds: Normal heart sounds.   Pulmonary:      Effort: Pulmonary effort is normal.      Breath sounds: Normal breath sounds and air entry.   Musculoskeletal:         General: Normal range of motion.      Cervical back: Normal range of motion.   Skin:     General: Skin is warm and dry.      Findings: No rash.   Neurological:      Mental Status: She is alert and oriented to person, place, and time.   Psychiatric:         Behavior: Behavior normal.        Result Review :                 Assessment and Plan    Diagnoses and all orders for this visit:    1. Upper respiratory tract infection, unspecified type (Primary)  Assessment & Plan:  Encourage fluids, rest, food as tolerated, finish antibiotic and Medrol pack.  Patient to follow-up if no improvement in symptoms or they worsen.      2. Sinus congestion    3. Hoarseness of voice  Assessment & Plan:  Encouraged to avoid caffeine, rest voice, increased fluid intake.      Other orders  -     amoxicillin-clavulanate (Augmentin) 875-125 MG per tablet; Take 1 tablet by mouth 2 (Two) Times a Day.  Dispense: 20 tablet; Refill: 0  -     methylPREDNISolone (MEDROL) 4 MG dose pack; Take as directed on package instructions.  Dispense: 21 each; Refill: 0      Follow Up   Return for Next scheduled follow up.  Patient was given instructions and counseling regarding her condition or for health maintenance advice. Please see specific information pulled into the AVS if appropriate.

## 2021-12-06 RX ORDER — LEVOTHYROXINE SODIUM 0.07 MG/1
TABLET ORAL
Qty: 90 TABLET | Refills: 3 | OUTPATIENT
Start: 2021-12-06

## 2021-12-06 RX ORDER — TOPIRAMATE 100 MG/1
100 TABLET, FILM COATED ORAL 2 TIMES DAILY
Qty: 180 TABLET | Refills: 3 | Status: SHIPPED | OUTPATIENT
Start: 2021-12-06 | End: 2022-06-16 | Stop reason: SDUPTHER

## 2021-12-06 RX ORDER — LEVOTHYROXINE SODIUM 75 MCG
75 TABLET ORAL DAILY
Qty: 90 TABLET | Refills: 3 | Status: SHIPPED | OUTPATIENT
Start: 2021-12-06 | End: 2022-04-25 | Stop reason: CLARIF

## 2021-12-06 NOTE — TELEPHONE ENCOUNTER
I will refill for patient however, we will need to check her thyroid levels in January as it has been jerry a year and half since last assessed.

## 2021-12-06 NOTE — TELEPHONE ENCOUNTER
Spoke with patient and she states she has had labs by her rheumatologist and cardiologist called to get records

## 2022-01-03 RX ORDER — SUMATRIPTAN 100 MG/1
TABLET, FILM COATED ORAL
Qty: 9 TABLET | Refills: 3 | Status: SHIPPED | OUTPATIENT
Start: 2022-01-03 | End: 2022-01-10 | Stop reason: SDUPTHER

## 2022-01-10 ENCOUNTER — OFFICE VISIT (OUTPATIENT)
Dept: PAIN MEDICINE | Facility: CLINIC | Age: 57
End: 2022-01-10

## 2022-01-10 VITALS
DIASTOLIC BLOOD PRESSURE: 80 MMHG | RESPIRATION RATE: 16 BRPM | HEIGHT: 68 IN | SYSTOLIC BLOOD PRESSURE: 133 MMHG | OXYGEN SATURATION: 59 % | HEART RATE: 59 BPM | TEMPERATURE: 97.5 F | WEIGHT: 153 LBS | BODY MASS INDEX: 23.19 KG/M2

## 2022-01-10 DIAGNOSIS — M54.2 CERVICALGIA: Primary | ICD-10-CM

## 2022-01-10 DIAGNOSIS — M48.02 NEUROFORAMINAL STENOSIS OF CERVICAL SPINE: ICD-10-CM

## 2022-01-10 DIAGNOSIS — M54.12 CERVICAL RADICULOPATHY: ICD-10-CM

## 2022-01-10 DIAGNOSIS — M47.812 CERVICAL SPONDYLOSIS WITHOUT MYELOPATHY: ICD-10-CM

## 2022-01-10 DIAGNOSIS — M25.50 PAIN IN JOINT INVOLVING MULTIPLE SITES: ICD-10-CM

## 2022-01-10 PROCEDURE — G0463 HOSPITAL OUTPT CLINIC VISIT: HCPCS | Performed by: PHYSICAL MEDICINE & REHABILITATION

## 2022-01-10 PROCEDURE — 99213 OFFICE O/P EST LOW 20 MIN: CPT | Performed by: PHYSICAL MEDICINE & REHABILITATION

## 2022-01-10 RX ORDER — HYDROCODONE BITARTRATE AND ACETAMINOPHEN 10; 325 MG/1; MG/1
1 TABLET ORAL EVERY 6 HOURS PRN
Qty: 120 TABLET | Refills: 0 | Status: SHIPPED | OUTPATIENT
Start: 2022-01-10 | End: 2022-04-08 | Stop reason: SDUPTHER

## 2022-01-10 RX ORDER — SUMATRIPTAN 100 MG/1
100 TABLET, FILM COATED ORAL ONCE AS NEEDED
Qty: 9 TABLET | Refills: 3 | Status: SHIPPED | OUTPATIENT
Start: 2022-01-10 | End: 2022-04-08 | Stop reason: SDUPTHER

## 2022-01-10 RX ORDER — HYDROCODONE BITARTRATE AND ACETAMINOPHEN 10; 325 MG/1; MG/1
1 TABLET ORAL EVERY 6 HOURS PRN
Qty: 120 TABLET | Refills: 0 | Status: SHIPPED | OUTPATIENT
Start: 2022-01-10 | End: 2022-02-10 | Stop reason: SDUPTHER

## 2022-01-10 NOTE — PROGRESS NOTES
Subjective   Tasneem Valentino is a 56 y.o. female.     Chronic daily headaches, also widespread joint pain with SLE, 10/10 at worst, 1/10 at best, always present, varies, aching, stabbing, worse with weather changes, interferes with ADLs, sleep, failed chiropractor, PT, massage, occipital neurotomy, headaches stim trial. MRI brain wnl. Prior notes reviewed, as above, was seeing Dr. Laguerre with Norco 10mg QID prn and Imitrex 100mg #9/month with some relief, also started Amovig 70mg, uses Zofran 4mg prn. No FH of substance abuse. 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 Had 3 cervical ESIs, symptoms essentially resolved after 1st, improved after 2nd, weakness worsening with a lot of lifting at work.        The following portions of the patient's history were reviewed and updated as appropriate: allergies, current medications, past family history, past medical history, past social history, past surgical history and problem list.    Review of Systems   Constitutional: Negative for chills, fatigue and fever.   HENT: Positive for hearing loss. Negative for trouble swallowing.    Eyes: Positive for visual disturbance.   Respiratory: Negative for shortness of breath.    Cardiovascular: Negative for chest pain.   Gastrointestinal: Positive for constipation. Negative for abdominal pain, diarrhea, nausea and vomiting.   Genitourinary: Negative for urinary incontinence.   Musculoskeletal: Negative for arthralgias, back pain, joint swelling, myalgias and neck pain.   Neurological: Positive for dizziness and headache. Negative for weakness and numbness.       Objective   Physical Exam   Constitutional: She is oriented to person, place, and time. She appears well-developed and well-nourished.   HENT:   Head: Normocephalic and atraumatic.   Eyes: Pupils are equal, round, and reactive to light.   Cardiovascular: Normal rate and regular rhythm.    Murmur heard.  Pulmonary/Chest: Breath sounds normal.   Abdominal: Soft. Bowel sounds are normal. She exhibits no distension. There is no abdominal tenderness.   Neurological: She is alert and oriented to person, place, and time. She has normal reflexes. She displays normal reflexes. No sensory deficit.   Psychiatric: Her behavior is normal. Thought content normal.         Assessment/Plan   Diagnoses and all orders for this visit:    1. Cervicalgia (Primary)    2. Cervical spondylosis without myelopathy    3. Cervical radiculopathy    4. Neuroforaminal stenosis of cervical spine    5. Pain in joint involving multiple sites        UDS in order 10/21/21. Inspect reviewed, in order.  Treatment plan will consist of continuing current medication as long as it remains effective and is necessary, while evaluating patient at each visit and determining if the medication can be lowered or discontinued, while also using nonopioid therapies to reduce reliance on opioids.  Cont Norco 10mg QID prn, Imitrex 100mg #9.  Began zofran 4mg TID prn.  Had 3 cervical ESIs per MRI. No contrast per allergy. Some discomfort with left paramedian approach, will perform on right in future.  Failed headache procedures.  RTC in 3 months for f/u.

## 2022-02-10 ENCOUNTER — OFFICE VISIT (OUTPATIENT)
Dept: FAMILY MEDICINE CLINIC | Facility: CLINIC | Age: 57
End: 2022-02-10

## 2022-02-10 VITALS
HEIGHT: 68 IN | OXYGEN SATURATION: 98 % | SYSTOLIC BLOOD PRESSURE: 111 MMHG | HEART RATE: 70 BPM | BODY MASS INDEX: 23.92 KG/M2 | WEIGHT: 157.8 LBS | RESPIRATION RATE: 16 BRPM | TEMPERATURE: 97.7 F | DIASTOLIC BLOOD PRESSURE: 66 MMHG

## 2022-02-10 DIAGNOSIS — J01.01 ACUTE RECURRENT MAXILLARY SINUSITIS: Primary | ICD-10-CM

## 2022-02-10 DIAGNOSIS — R11.0 NAUSEA: ICD-10-CM

## 2022-02-10 DIAGNOSIS — Z12.11 SCREEN FOR COLON CANCER: ICD-10-CM

## 2022-02-10 PROCEDURE — 99213 OFFICE O/P EST LOW 20 MIN: CPT | Performed by: NURSE PRACTITIONER

## 2022-02-10 RX ORDER — AMOXICILLIN AND CLAVULANATE POTASSIUM 875; 125 MG/1; MG/1
1 TABLET, FILM COATED ORAL 2 TIMES DAILY
Qty: 14 TABLET | Refills: 0 | Status: SHIPPED | OUTPATIENT
Start: 2022-02-10 | End: 2022-04-08

## 2022-02-10 RX ORDER — AMOXICILLIN AND CLAVULANATE POTASSIUM 875; 125 MG/1; MG/1
1 TABLET, FILM COATED ORAL 2 TIMES DAILY
Qty: 14 TABLET | Refills: 0 | Status: SHIPPED | OUTPATIENT
Start: 2022-02-10 | End: 2022-02-10

## 2022-02-10 NOTE — ASSESSMENT & PLAN NOTE
Antibiotic prescribed.  Patient advised to take all of medication.  She is to return for follow-up if symptoms do not resolve after treatment.  Advised to take Tylenol for any discomforts.  Continue using Flonase.

## 2022-02-10 NOTE — PROGRESS NOTES
"Chief Complaint  Sinus Problem, Nasal Congestion, Laryngitis, Generalized Body Aches, and Sore Throat    Subjective          Tasneem Valentino presents to [facility]      History of Present Illness    Tasneem is a 57-year-old female patient who presents today with acute sinus issues.  She complains of nasal congestion, body aches, sore throat, laryngitis.     She reports she has had symptoms for approximately 2 weeks.  Started out mild with some postnasal drip and congestion and has progressed since. She did a home Covid test last week and it was negative. She was positive for Covid in July/August.  She has been taking Flonase daily without any relief.  She does have a history of chronic sinus issues.  She reports a \"scratchy voice \"and this is audible.  She is without fever, cough, headache.  She is slightly nauseous from the postnasal drip.  I offered Zofran however she reports that she still has Zofran from her neurologist that is for her migraines and she is taking that with nausea relief.  She reports that her right sinuses are worse than her left as far as discomfort.  She is generally achy.  She reports this is normal when she is with sinus issues.    She reports that Dr. Good, her nephrologist, would like her to get a colonoscopy due to her hemoglobin dropping.  She did provide a copy of that lab work today and I have placed it to scanned in her chart.  Her hemoglobin was 10.3 on January 10 according to the lab work.    Patient is evidence of acute sinusitis.  I will place her on Augmentin twice daily for 7 days.  She is to continue using Flonase.  I do adviseTylenol for discomfort, pain, headaches.  Vital signs stable today.  She is to return if no improvement after completion of antibiotic.    Objective     Vital Signs:   /66 (BP Location: Left arm, Patient Position: Sitting, Cuff Size: Adult)   Pulse 70   Temp 97.7 °F (36.5 °C) (Infrared)   Resp 16   Ht 172.7 cm " "(67.99\")   Wt 71.6 kg (157 lb 12.8 oz)   SpO2 98%   BMI 24.00 kg/m²           Physical Exam  Constitutional:       Appearance: She is well-developed.      Comments: Wearing a face mask     HENT:      Head: Normocephalic and atraumatic.      Right Ear: Ear canal and external ear normal. No tenderness. A middle ear effusion is present. There is no impacted cerumen.      Left Ear: Ear canal and external ear normal. No tenderness. A middle ear effusion is present. There is no impacted cerumen.      Ears:      Comments: Bilateral hearing aides removed for exam       Nose: Congestion and rhinorrhea present. Rhinorrhea is purulent and bloody.      Right Nostril: No occlusion.      Left Nostril: No occlusion.      Right Turbinates: Enlarged.      Left Turbinates: Enlarged.      Right Sinus: Maxillary sinus tenderness present. No frontal sinus tenderness.      Left Sinus: Maxillary sinus tenderness present. No frontal sinus tenderness.      Comments: Scant rhinorrhea      Mouth/Throat:      Pharynx: Posterior oropharyngeal erythema present.   Eyes:      Conjunctiva/sclera: Conjunctivae normal.   Cardiovascular:      Rate and Rhythm: Normal rate and regular rhythm.      Pulses: Normal pulses.      Heart sounds: Normal heart sounds.   Pulmonary:      Effort: Pulmonary effort is normal. No respiratory distress.      Breath sounds: Normal breath sounds.   Musculoskeletal:         General: Normal range of motion.      Cervical back: Normal range of motion and neck supple.   Lymphadenopathy:      Head:      Right side of head: No submental, submandibular, tonsillar, preauricular, posterior auricular or occipital adenopathy.      Left side of head: No submental, submandibular, tonsillar, preauricular, posterior auricular or occipital adenopathy.      Cervical: No cervical adenopathy.   Skin:     General: Skin is warm and dry.      Findings: No rash.   Neurological:      Mental Status: She is alert and oriented to person, place, " and time.   Psychiatric:         Behavior: Behavior normal.                Result Review :                                   Assessment and Plan      Diagnoses and all orders for this visit:    1. Acute recurrent maxillary sinusitis (Primary)  Assessment & Plan:  Antibiotic prescribed.  Patient advised to take all of medication.  She is to return for follow-up if symptoms do not resolve after treatment.  Advised to take Tylenol for any discomforts.  Continue using Flonase.      2. Nausea  Assessment & Plan:  Nauseated from PND.   Patient has medication at home, Zofran for relief.      3. Screen for colon cancer  Assessment & Plan:  Patient is due for colonoscopy.  She was found to have a low hemoglobin when she went to see her nephrologist.  I have placed the order for gastroenterology.    Orders:  -     Ambulatory Referral to Gastroenterology    Other orders  -     Discontinue: amoxicillin-clavulanate (Augmentin) 875-125 MG per tablet; Take 1 tablet by mouth 2 (Two) Times a Day.  Dispense: 14 tablet; Refill: 0  -     amoxicillin-clavulanate (Augmentin) 875-125 MG per tablet; Take 1 tablet by mouth 2 (Two) Times a Day.  Dispense: 14 tablet; Refill: 0          Follow Up       Return if symptoms worsen or fail to improve.      Patient was given instructions and counseling regarding her condition or for health maintenance advice. Please see specific information pulled into the AVS if appropriate.     Piper Levine, [id number]/[id number] EST  This note has been electronically signed

## 2022-02-10 NOTE — ASSESSMENT & PLAN NOTE
Patient is due for colonoscopy.  She was found to have a low hemoglobin when she went to see her nephrologist.  I have placed the order for gastroenterology.

## 2022-02-18 DIAGNOSIS — F41.9 ANXIETY: ICD-10-CM

## 2022-04-08 ENCOUNTER — OFFICE VISIT (OUTPATIENT)
Dept: PAIN MEDICINE | Facility: CLINIC | Age: 57
End: 2022-04-08

## 2022-04-08 VITALS
WEIGHT: 157 LBS | RESPIRATION RATE: 16 BRPM | HEART RATE: 65 BPM | DIASTOLIC BLOOD PRESSURE: 73 MMHG | BODY MASS INDEX: 23.79 KG/M2 | OXYGEN SATURATION: 98 % | HEIGHT: 68 IN | SYSTOLIC BLOOD PRESSURE: 131 MMHG

## 2022-04-08 DIAGNOSIS — M48.02 NEUROFORAMINAL STENOSIS OF CERVICAL SPINE: ICD-10-CM

## 2022-04-08 DIAGNOSIS — M47.812 CERVICAL SPONDYLOSIS WITHOUT MYELOPATHY: ICD-10-CM

## 2022-04-08 DIAGNOSIS — M54.2 CERVICALGIA: Primary | ICD-10-CM

## 2022-04-08 DIAGNOSIS — M54.12 CERVICAL RADICULOPATHY: ICD-10-CM

## 2022-04-08 PROCEDURE — 99213 OFFICE O/P EST LOW 20 MIN: CPT | Performed by: PHYSICAL MEDICINE & REHABILITATION

## 2022-04-08 RX ORDER — SUMATRIPTAN 100 MG/1
100 TABLET, FILM COATED ORAL ONCE AS NEEDED
Qty: 9 TABLET | Refills: 3 | Status: SHIPPED | OUTPATIENT
Start: 2022-04-08 | End: 2022-07-07 | Stop reason: SDUPTHER

## 2022-04-08 RX ORDER — HYDROCODONE BITARTRATE AND ACETAMINOPHEN 10; 325 MG/1; MG/1
1 TABLET ORAL EVERY 6 HOURS PRN
Qty: 120 TABLET | Refills: 0 | Status: SHIPPED | OUTPATIENT
Start: 2022-04-08 | End: 2022-07-07 | Stop reason: SDUPTHER

## 2022-04-08 NOTE — PROGRESS NOTES
Subjective   Tasneem Valentino is a 57 y.o. female.     Chronic daily headaches, also widespread joint pain with SLE, 10/10 at worst, 1/10 at best, always present, varies, aching, stabbing, worse with weather changes, interferes with ADLs, sleep, failed chiropractor, PT, massage, occipital neurotomy, headaches stim trial. MRI brain wnl. Prior notes reviewed, as above, was seeing Dr. Laguerre with Norco 10mg QID prn and Imitrex 100mg #9/month with some relief, also started Amovig 70mg, uses Zofran 4mg prn. No FH of substance abuse. 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 Had 3 cervical ESIs, symptoms essentially resolved after 1st, improved after 2nd, weakness worsening with a lot of lifting at work.        The following portions of the patient's history were reviewed and updated as appropriate: allergies, current medications, past family history, past medical history, past social history, past surgical history and problem list.    Review of Systems   Constitutional: Negative for chills, fatigue and fever.   HENT: Positive for hearing loss. Negative for trouble swallowing.    Eyes: Positive for visual disturbance.   Respiratory: Negative for shortness of breath.    Cardiovascular: Negative for chest pain.   Gastrointestinal: Positive for constipation. Negative for abdominal pain, diarrhea, nausea and vomiting.   Genitourinary: Negative for urinary incontinence.   Musculoskeletal: Negative for arthralgias, back pain, joint swelling, myalgias and neck pain.   Neurological: Positive for dizziness and headache. Negative for weakness and numbness.       Objective   Physical Exam   Constitutional: She is oriented to person, place, and time. She appears well-developed and well-nourished.   HENT:   Head: Normocephalic and atraumatic.   Eyes: Pupils are equal, round, and reactive to light.   Cardiovascular: Normal rate and regular rhythm.    Murmur heard.  Pulmonary/Chest: Breath sounds normal.   Abdominal: Soft. Bowel sounds are normal. She exhibits no distension. There is no abdominal tenderness.   Neurological: She is alert and oriented to person, place, and time. She has normal reflexes. She displays normal reflexes. No sensory deficit.   Psychiatric: Her behavior is normal. Thought content normal.         Assessment/Plan   Diagnoses and all orders for this visit:    1. Cervicalgia (Primary)    2. Cervical radiculopathy    3. Cervical spondylosis without myelopathy    4. Neuroforaminal stenosis of cervical spine        UDS in order 10/21/21. Inspect reviewed, in order.  Treatment plan will consist of continuing current medication as long as it remains effective and is necessary, while evaluating patient at each visit and determining if the medication can be lowered or discontinued, while also using nonopioid therapies to reduce reliance on opioids.  Cont Norco 10mg QID prn, Imitrex 100mg #9.  Began zofran 4mg TID prn.  Had 3 cervical ESIs per MRI. No contrast per allergy. Some discomfort with left paramedian approach, will perform on right in future.  Failed headache procedures.  RTC in 3 months for f/u.

## 2022-04-25 RX ORDER — LEVOTHYROXINE SODIUM 0.07 MG/1
75 TABLET ORAL DAILY
Qty: 90 TABLET | Refills: 1 | Status: SHIPPED | OUTPATIENT
Start: 2022-04-25 | End: 2022-07-18 | Stop reason: SDUPTHER

## 2022-04-25 RX ORDER — LEVOTHYROXINE SODIUM 75 MCG
75 TABLET ORAL DAILY
Qty: 90 TABLET | Refills: 3 | Status: CANCELLED | OUTPATIENT
Start: 2022-04-25

## 2022-04-25 NOTE — TELEPHONE ENCOUNTER
I changed her Synthroid to levothyroxine.  Could you please remind her she has not been in to see me in a while and ask if we can schedule  her for labs/follow up or a physical.

## 2022-04-25 NOTE — TELEPHONE ENCOUNTER
Pt requesting 3 months supply of generic synthroid, levothyroxine 75 mcg, sent to [facility] in [place]. Pt states the previous script to [facility] was marked for synthroid only and she had to pay over $100 just for one months supply of it.

## 2022-05-11 ENCOUNTER — OFFICE VISIT (OUTPATIENT)
Dept: FAMILY MEDICINE CLINIC | Facility: CLINIC | Age: 57
End: 2022-05-11

## 2022-05-11 VITALS
BODY MASS INDEX: 24.46 KG/M2 | WEIGHT: 161.4 LBS | HEART RATE: 61 BPM | DIASTOLIC BLOOD PRESSURE: 70 MMHG | TEMPERATURE: 98.2 F | SYSTOLIC BLOOD PRESSURE: 112 MMHG | HEIGHT: 68 IN | OXYGEN SATURATION: 98 %

## 2022-05-11 DIAGNOSIS — E03.9 ACQUIRED HYPOTHYROIDISM: Primary | ICD-10-CM

## 2022-05-11 DIAGNOSIS — I10 ESSENTIAL HYPERTENSION: ICD-10-CM

## 2022-05-11 DIAGNOSIS — J01.00 ACUTE NON-RECURRENT MAXILLARY SINUSITIS: ICD-10-CM

## 2022-05-11 DIAGNOSIS — E55.9 VITAMIN D DEFICIENCY: ICD-10-CM

## 2022-05-11 PROCEDURE — 99214 OFFICE O/P EST MOD 30 MIN: CPT | Performed by: NURSE PRACTITIONER

## 2022-05-11 RX ORDER — OMEPRAZOLE 20 MG/1
20 CAPSULE, DELAYED RELEASE ORAL DAILY
Qty: 90 CAPSULE | Refills: 3 | Status: SHIPPED | OUTPATIENT
Start: 2022-05-11

## 2022-05-11 RX ORDER — AMOXICILLIN AND CLAVULANATE POTASSIUM 875; 125 MG/1; MG/1
1 TABLET, FILM COATED ORAL 2 TIMES DAILY
Qty: 14 TABLET | Refills: 0 | Status: SHIPPED | OUTPATIENT
Start: 2022-05-11 | End: 2022-07-07

## 2022-05-11 RX ORDER — DULOXETIN HYDROCHLORIDE 30 MG/1
30 CAPSULE, DELAYED RELEASE ORAL DAILY
COMMUNITY
Start: 2022-05-05 | End: 2022-09-27 | Stop reason: DRUGHIGH

## 2022-05-11 NOTE — ASSESSMENT & PLAN NOTE
Treating patient with Augmentin.  Advised her to increase her Flonase from once daily to both nostrils twice daily during antibiotic duration.  Patient to return if symptoms worsen or do not improve.

## 2022-05-11 NOTE — ASSESSMENT & PLAN NOTE
Assessing thyroid panel today.  We will make any changes to medication once labs have been returned and reviewed

## 2022-05-11 NOTE — PROGRESS NOTES
"Chief Complaint  Hypothyroidism and Sinusitis    Subjective          Tasneem Valentino presents to [facility]      History of Present Illness    Tasneem is a 57-year-old female patient who presents today to follow-up on her thyroid.  She has a history of aortic insufficiency, hypertension, Ménière's, hypothyroidism, vitamin D deficiency, anemia, anxiety, depression, lupus, cervicalgia and cervical radiculopathy.     Hypertension -her blood pressure is excellent today at 112/70.  She denies any chest pain, headaches, palpitations, SOB, visual disturbances.  She takes Coreg 3.125 mg twice daily.  Condition is stable.    Hypothyroidism- Her last TSH was in June 2020 and it was 2.3. T4 was 1.19.  She has been stable on Synthroid 75 mcg daily.  She denies any hot or cold intolerances, hair loss, palpitations.    Sinuses- She has been dealing with her symptoms for over two weeks. She has been using Flonase once daily and with minimal relief. She is with right sided sinus pressure and congestion.  She has had yellow to dark brown nasal drainage when she blows her nose.  She is with a hoarse voice. No fever, N/V/D. She is feeling run down and fatigued due to this. Advised to increase Flonase to twice daily while on atbx therapy.       Objective     Vital Signs:   /70 (BP Location: Left arm, Patient Position: Sitting, Cuff Size: Adult)   Pulse 61   Temp 98.2 °F (36.8 °C) (Oral)   Ht 172.7 cm (67.99\")   Wt 73.2 kg (161 lb 6.4 oz)   SpO2 98%   BMI 24.55 kg/m²           Physical Exam  Constitutional:       Appearance: She is well-developed.      Comments: Wearing a face mask     HENT:      Head: Normocephalic and atraumatic.      Right Ear: Tympanic membrane, ear canal and external ear normal.      Left Ear: Tympanic membrane, ear canal and external ear normal.      Nose: Congestion and rhinorrhea present. Rhinorrhea is purulent.      Right Turbinates: Enlarged.      Left Turbinates: Not " enlarged.      Right Sinus: Frontal sinus tenderness present.      Left Sinus: No frontal sinus tenderness.      Mouth/Throat:      Mouth: Mucous membranes are moist.      Pharynx: Oropharynx is clear.   Eyes:      Conjunctiva/sclera: Conjunctivae normal.      Pupils: Pupils are equal, round, and reactive to light.   Neck:      Thyroid: No thyroid mass, thyromegaly or thyroid tenderness.   Cardiovascular:      Rate and Rhythm: Normal rate and regular rhythm.      Pulses: Normal pulses.      Heart sounds: Normal heart sounds.   Pulmonary:      Effort: Pulmonary effort is normal.      Breath sounds: Normal breath sounds.   Musculoskeletal:         General: Normal range of motion.      Cervical back: Normal range of motion and neck supple.   Lymphadenopathy:      Head:      Right side of head: No submental, submandibular, tonsillar, preauricular, posterior auricular or occipital adenopathy.      Left side of head: No submental, submandibular, tonsillar, preauricular, posterior auricular or occipital adenopathy.      Cervical: No cervical adenopathy.   Skin:     General: Skin is warm and dry.      Findings: No rash.   Neurological:      Mental Status: She is alert and oriented to person, place, and time.   Psychiatric:         Behavior: Behavior normal.                Result Review :                                   Assessment and Plan      Diagnoses and all orders for this visit:    1. Acquired hypothyroidism (Primary)  Assessment & Plan:  Assessing thyroid panel today.  We will make any changes to medication once labs have been returned and reviewed    Orders:  -     TSH  -     T3, free  -     T4, free    2. Acute non-recurrent maxillary sinusitis  Assessment & Plan:  Treating patient with Augmentin.  Advised her to increase her Flonase from once daily to both nostrils twice daily during antibiotic duration.  Patient to return if symptoms worsen or do not improve.      3. Essential hypertension  Assessment &  Plan:  Hypertension is improving with treatment.  Continue current treatment regimen.  Blood pressure will be reassessed in 3 months.    Orders:  -     CBC (No Diff)  -     Comprehensive metabolic panel    4. Vitamin D deficiency  -     Vitamin D 25 Hydroxy    Other orders  -     amoxicillin-clavulanate (Augmentin) 875-125 MG per tablet; Take 1 tablet by mouth 2 (Two) Times a Day.  Dispense: 14 tablet; Refill: 0  -     omeprazole (priLOSEC) 20 MG capsule; Take 1 capsule by mouth Daily.  Dispense: 90 capsule; Refill: 3          Follow Up {Instructions Charge Capture  Follow-up Communications :23}      No follow-ups on file.      Patient was given instructions and counseling regarding her condition or for health maintenance advice. Please see specific information pulled into the AVS if appropriate.     Piper Levine, APRN5/11/202216:09 EDT  This note has been electronically signed

## 2022-05-12 LAB
25(OH)D3+25(OH)D2 SERPL-MCNC: 24.2 NG/ML (ref 30–100)
ALBUMIN SERPL-MCNC: 4.1 G/DL (ref 3.8–4.9)
ALBUMIN/GLOB SERPL: 2 {RATIO} (ref 1.2–2.2)
ALP SERPL-CCNC: 114 IU/L (ref 44–121)
ALT SERPL-CCNC: 9 IU/L (ref 0–32)
AST SERPL-CCNC: 18 IU/L (ref 0–40)
BILIRUB SERPL-MCNC: <0.2 MG/DL (ref 0–1.2)
BUN SERPL-MCNC: 19 MG/DL (ref 6–24)
BUN/CREAT SERPL: 18 (ref 9–23)
CALCIUM SERPL-MCNC: 9.1 MG/DL (ref 8.7–10.2)
CHLORIDE SERPL-SCNC: 107 MMOL/L (ref 96–106)
CO2 SERPL-SCNC: 20 MMOL/L (ref 20–29)
CREAT SERPL-MCNC: 1.05 MG/DL (ref 0.57–1)
EGFRCR SERPLBLD CKD-EPI 2021: 62 ML/MIN/1.73
ERYTHROCYTE [DISTWIDTH] IN BLOOD BY AUTOMATED COUNT: 14.7 % (ref 11.7–15.4)
GLOBULIN SER CALC-MCNC: 2.1 G/DL (ref 1.5–4.5)
GLUCOSE SERPL-MCNC: 94 MG/DL (ref 65–99)
HCT VFR BLD AUTO: 29.3 % (ref 34–46.6)
HGB BLD-MCNC: 9.3 G/DL (ref 11.1–15.9)
MCH RBC QN AUTO: 25.5 PG (ref 26.6–33)
MCHC RBC AUTO-ENTMCNC: 31.7 G/DL (ref 31.5–35.7)
MCV RBC AUTO: 80 FL (ref 79–97)
PLATELET # BLD AUTO: 182 X10E3/UL (ref 150–450)
POTASSIUM SERPL-SCNC: 4.3 MMOL/L (ref 3.5–5.2)
PROT SERPL-MCNC: 6.2 G/DL (ref 6–8.5)
RBC # BLD AUTO: 3.65 X10E6/UL (ref 3.77–5.28)
SODIUM SERPL-SCNC: 140 MMOL/L (ref 134–144)
T3FREE SERPL-MCNC: 2.2 PG/ML (ref 2–4.4)
T4 FREE SERPL-MCNC: 0.83 NG/DL (ref 0.82–1.77)
TSH SERPL DL<=0.005 MIU/L-ACNC: 1.15 UIU/ML (ref 0.45–4.5)
WBC # BLD AUTO: 4.2 X10E3/UL (ref 3.4–10.8)

## 2022-05-12 NOTE — PROGRESS NOTES
Please advise here that her labs look well overall.  She does still have a slightly decreased vitamin D level of 24.2.  We are getting closer to that normal range.  Want to continue her taking vitamin D supplementation.

## 2022-05-20 ENCOUNTER — TELEPHONE (OUTPATIENT)
Dept: FAMILY MEDICINE CLINIC | Facility: CLINIC | Age: 57
End: 2022-05-20

## 2022-05-20 RX ORDER — AZITHROMYCIN 250 MG/1
TABLET, FILM COATED ORAL
Qty: 6 TABLET | Refills: 0 | Status: SHIPPED | OUTPATIENT
Start: 2022-05-20 | End: 2022-05-20

## 2022-05-20 RX ORDER — AZITHROMYCIN 250 MG/1
TABLET, FILM COATED ORAL
Qty: 6 TABLET | Refills: 0 | Status: CANCELLED | OUTPATIENT
Start: 2022-05-20 | End: 2022-05-25

## 2022-05-20 RX ORDER — AZITHROMYCIN 250 MG/1
TABLET, FILM COATED ORAL
Qty: 6 TABLET | Refills: 0 | Status: SHIPPED | OUTPATIENT
Start: 2022-05-20 | End: 2022-05-25

## 2022-05-20 NOTE — TELEPHONE ENCOUNTER
Pt calling.  Has finished abx Wednesday.  All symptoms have returned.  States you had mentioned you would extend abx?  Please advise.

## 2022-05-20 NOTE — TELEPHONE ENCOUNTER
Sent in a Z-Raúl at this time.  I do not like to do penicillins back to back due to the possibility of causing C. difficile.  Please advise.

## 2022-06-16 RX ORDER — TOPIRAMATE 100 MG/1
100 TABLET, FILM COATED ORAL 2 TIMES DAILY
Qty: 180 TABLET | Refills: 3 | Status: SHIPPED | OUTPATIENT
Start: 2022-06-16

## 2022-06-16 NOTE — TELEPHONE ENCOUNTER
Caller: Tasneem Valentino    Relationship: Self    Best call back number: [phone]    Requested Prescriptions:   Requested Prescriptions     Pending Prescriptions Disp Refills   • topiramate (TOPAMAX) 100 MG tablet 180 tablet 3     Sig: Take 1 tablet by mouth 2 (Two) Times a Day.        Pharmacy where request should be sent: [id number] - [place], [place] - [address] AT [facility] [address] - [phone] [facility] [phone] FX     Additional details provided by patient: ONLY HAS 1 DAY LEFT    Does the patient have less than a 3 day supply:  [x] Yes  [] No    Bashir Ndiaye Rep   06/16/22 14:22 EDT

## 2022-06-29 ENCOUNTER — TELEPHONE (OUTPATIENT)
Dept: FAMILY MEDICINE CLINIC | Facility: CLINIC | Age: 57
End: 2022-06-29

## 2022-06-29 NOTE — TELEPHONE ENCOUNTER
Caller: Tasneem Valentino    Relationship: Self    Best call back number: [phone] (H)      PATIENT IS A R/N AND STRUGGLES WITH SHORTNESS OF BREATHE FROM PAST COVID. SHE IS REQUESTING A STATEMENT FROM THE PROVIDER THAT STATES SHE IS RESTRICTED FROM DOING COMPRESSIONS DURING ANY KIND OF CODES THAT ARE CALLED.    SHE REQUESTS THIS FOR THE NEXT 3 MONTHS UNTIL TESTING COMES AROUND AGAIN.    PATIENT CANNOT PASS THE COMPRESSIONS ON CPR BECAUSE SHE IS TOO SHORT OF AIR.    THIS SIGNED STATEMENT CAN BE SENT BY EMAIL TO [email or web address]    PATIENT HAS AN UPCOMING APPOINTMENT THIS NEXT WEEK ON Thursday 07/7/22 AND WONDERS ABOUT GETTING AN X-RAY TO SEE WHY SHE IS SO SHORT OF BREATHE.  PLEASE GIVE PATIENT A CALLBACK.

## 2022-06-30 NOTE — TELEPHONE ENCOUNTER
We can do an xray. However, if she is having this much SOB we may want to consider pulmonary referral. I will print her a note off if you can fax if please.

## 2022-07-07 ENCOUNTER — OFFICE VISIT (OUTPATIENT)
Dept: PAIN MEDICINE | Facility: CLINIC | Age: 57
End: 2022-07-07

## 2022-07-07 ENCOUNTER — OFFICE VISIT (OUTPATIENT)
Dept: FAMILY MEDICINE CLINIC | Facility: CLINIC | Age: 57
End: 2022-07-07

## 2022-07-07 VITALS
OXYGEN SATURATION: 98 % | SYSTOLIC BLOOD PRESSURE: 106 MMHG | HEIGHT: 67 IN | HEART RATE: 64 BPM | BODY MASS INDEX: 24.64 KG/M2 | WEIGHT: 157 LBS | TEMPERATURE: 97.3 F | DIASTOLIC BLOOD PRESSURE: 60 MMHG | RESPIRATION RATE: 18 BRPM

## 2022-07-07 VITALS
OXYGEN SATURATION: 99 % | WEIGHT: 161 LBS | SYSTOLIC BLOOD PRESSURE: 128 MMHG | HEART RATE: 68 BPM | DIASTOLIC BLOOD PRESSURE: 40 MMHG | BODY MASS INDEX: 25.27 KG/M2 | HEIGHT: 67 IN | RESPIRATION RATE: 16 BRPM

## 2022-07-07 DIAGNOSIS — M54.12 CERVICAL RADICULOPATHY: ICD-10-CM

## 2022-07-07 DIAGNOSIS — M25.50 PAIN IN JOINT INVOLVING MULTIPLE SITES: Primary | ICD-10-CM

## 2022-07-07 DIAGNOSIS — R51.9 CHRONIC DAILY HEADACHE: ICD-10-CM

## 2022-07-07 DIAGNOSIS — M48.02 NEUROFORAMINAL STENOSIS OF CERVICAL SPINE: ICD-10-CM

## 2022-07-07 DIAGNOSIS — M54.2 CERVICALGIA: ICD-10-CM

## 2022-07-07 DIAGNOSIS — R06.02 SOB (SHORTNESS OF BREATH) ON EXERTION: Primary | ICD-10-CM

## 2022-07-07 DIAGNOSIS — M47.812 CERVICAL SPONDYLOSIS WITHOUT MYELOPATHY: ICD-10-CM

## 2022-07-07 PROCEDURE — 99213 OFFICE O/P EST LOW 20 MIN: CPT | Performed by: PHYSICAL MEDICINE & REHABILITATION

## 2022-07-07 PROCEDURE — 99213 OFFICE O/P EST LOW 20 MIN: CPT | Performed by: NURSE PRACTITIONER

## 2022-07-07 RX ORDER — HYDROCODONE BITARTRATE AND ACETAMINOPHEN 10; 325 MG/1; MG/1
1 TABLET ORAL EVERY 6 HOURS PRN
Qty: 120 TABLET | Refills: 0 | Status: SHIPPED | OUTPATIENT
Start: 2022-07-07 | End: 2022-10-06 | Stop reason: SDUPTHER

## 2022-07-07 RX ORDER — HYDROCODONE BITARTRATE AND ACETAMINOPHEN 10; 325 MG/1; MG/1
1 TABLET ORAL EVERY 6 HOURS PRN
Qty: 120 TABLET | Refills: 0 | Status: SHIPPED | OUTPATIENT
Start: 2022-07-07 | End: 2022-09-27 | Stop reason: SDUPTHER

## 2022-07-07 RX ORDER — SUMATRIPTAN 100 MG/1
100 TABLET, FILM COATED ORAL ONCE AS NEEDED
Qty: 9 TABLET | Refills: 3 | Status: SHIPPED | OUTPATIENT
Start: 2022-07-07 | End: 2022-10-06 | Stop reason: SDUPTHER

## 2022-07-07 RX ORDER — ALBUTEROL SULFATE 90 UG/1
2 AEROSOL, METERED RESPIRATORY (INHALATION) EVERY 4 HOURS PRN
Qty: 18 G | Refills: 1 | Status: SHIPPED | OUTPATIENT
Start: 2022-07-07

## 2022-07-07 NOTE — PROGRESS NOTES
"Chief Complaint  Shortness of Breath    Subjective          Tasneem Valentino presents to [facility]      History of Present Illness    Tasneem is a 57-year-old female patient who presents today with shortness of breath on exertion.      At the end of June I provided with her a note to avoid CPR participation due to SOB. She has had Covid three times since the pandemic began. The most recent was May 2022. She has no CP with her SOB. She is without wheezing.     When she exerts herself she feels weak and SOB. 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 Walking 30 yds to her garden causes her to be SOB and requires her to stop and rest. When she checks her sats they are always above 95%.     She did see  today for pain management.      Objective     Vital Signs:   /60 (BP Location: Left arm, Patient Position: Sitting, Cuff Size: Adult)   Pulse 64   Temp 97.3 °F (36.3 °C) (Infrared)   Resp 18   Ht 170.2 cm (67\")   Wt 71.2 kg (157 lb)   SpO2 98%   BMI 24.59 kg/m²           Physical Exam  Vitals reviewed.   Constitutional:       Appearance: She is well-developed.      Comments: Wearing a face mask     HENT:      Head: Normocephalic and atraumatic.   Eyes:      Conjunctiva/sclera: Conjunctivae normal.   Cardiovascular:      Rate and Rhythm: Normal rate and regular rhythm.      Pulses: Normal pulses.      Heart sounds: Normal heart sounds.   Pulmonary:      Effort: Pulmonary effort is normal. No respiratory distress.      Breath sounds: Normal breath sounds.   Musculoskeletal:         General: Normal range of motion.      Cervical back: Normal range of motion.   Skin:     General: Skin is warm and dry.      Findings: No rash.   Neurological:      Mental Status: She is alert and oriented to person, place, and time.   Psychiatric:         Behavior: Behavior normal.        Result Review :                           Assessment and Plan  "     Diagnoses and all orders for this visit:    1. SOB (shortness of breath) on exertion (Primary)  Assessment & Plan:  She tells me that she really feels like her shortness of breath is related to her COVID exposures.  It is quite possible as we are still learning about COVID and how it affects patient's.  Based on patient's symptoms, I find it necessary to do a chest x-ray and PFT.  Once we obtain that information, we may refer to pulmonary.    Orders:  -     Pulmonary Function Test; Future  -     XR Chest 2 View; Future    Other orders  -     albuterol sulfate  (90 Base) MCG/ACT inhaler; Inhale 2 puffs Every 4 (Four) Hours As Needed for Wheezing.  Dispense: 18 g; Refill: 1          Follow Up       No follow-ups on file.      Patient was given instructions and counseling regarding her condition or for health maintenance advice. Please see specific information pulled into the AVS if appropriate.     Piper Levine, APRN7/8/202211:42 EDT  This note has been electronically signed

## 2022-07-07 NOTE — PROGRESS NOTES
Subjective   Tasneem Valentino is a 57 y.o. female.     Chronic daily headaches, also widespread joint pain with SLE, 10/10 at worst, 1/10 at best, always present, varies, aching, stabbing, worse with weather changes, interferes with ADLs, sleep, failed chiropractor, PT, massage, occipital neurotomy, headaches stim trial. MRI brain wnl. Prior notes reviewed, as above, was seeing Dr. Laguerre with Norco 10mg QID prn and Imitrex 100mg #9/month with some relief, also started Amovig 70mg, uses Zofran 4mg prn. No FH of substance abuse. 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 Had 3 cervical ESIs, symptoms essentially resolved after 1st, improved after 2nd, weakness worsening with a lot of lifting at work.        The following portions of the patient's history were reviewed and updated as appropriate: allergies, current medications, past family history, past medical history, past social history, past surgical history and problem list.    Review of Systems   Constitutional: Negative for chills, fatigue and fever.   HENT: Positive for hearing loss. Negative for trouble swallowing.    Eyes: Positive for visual disturbance.   Respiratory: Negative for shortness of breath.    Cardiovascular: Negative for chest pain.   Gastrointestinal: Positive for constipation. Negative for abdominal pain, diarrhea, nausea and vomiting.   Genitourinary: Negative for urinary incontinence.   Musculoskeletal: Negative for arthralgias, back pain, joint swelling, myalgias and neck pain.   Neurological: Positive for dizziness and headache. Negative for weakness and numbness.       Objective   Physical Exam   Constitutional: She is oriented to person, place, and time. She appears well-developed and well-nourished.   HENT:   Head: Normocephalic and atraumatic.   Eyes: Pupils are equal, round, and reactive to light.   Cardiovascular: Normal rate and regular rhythm.    Murmur heard.  Pulmonary/Chest: Breath sounds normal.   Abdominal: Soft. Bowel sounds are normal. She exhibits no distension. There is no abdominal tenderness.   Neurological: She is alert and oriented to person, place, and time. She has normal reflexes. She displays normal reflexes. No sensory deficit.   Psychiatric: Her behavior is normal. Thought content normal.         Assessment & Plan   Diagnoses and all orders for this visit:    1. Pain in joint involving multiple sites (Primary)    2. Cervicalgia    3. Cervical spondylosis without myelopathy    4. Cervical radiculopathy    5. Chronic daily headache    6. Neuroforaminal stenosis of cervical spine        UDS in order 10/21/21. Inspect reviewed, in order.  Treatment plan will consist of continuing current medication as long as it remains effective and is necessary, while evaluating patient at each visit and determining if the medication can be lowered or discontinued, while also using nonopioid therapies to reduce reliance on opioids.  Cont Norco 10mg QID prn, Imitrex 100mg #9.  Began zofran 4mg TID prn.  Had 3 cervical ESIs per MRI. No contrast per allergy. Some discomfort with left paramedian approach, will perform on right in future.  Failed headache procedures.  RTC in 3 months for f/u.

## 2022-07-08 PROBLEM — R06.02 SOB (SHORTNESS OF BREATH) ON EXERTION: Status: ACTIVE | Noted: 2022-07-08

## 2022-07-08 NOTE — ASSESSMENT & PLAN NOTE
She tells me that she really feels like her shortness of breath is related to her COVID exposures.  It is quite possible as we are still learning about COVID and how it affects patient's.  Based on patient's symptoms, I find it necessary to do a chest x-ray and PFT.  Once we obtain that information, we may refer to pulmonary.

## 2022-07-18 ENCOUNTER — TELEPHONE (OUTPATIENT)
Dept: FAMILY MEDICINE CLINIC | Facility: CLINIC | Age: 57
End: 2022-07-18

## 2022-07-18 DIAGNOSIS — R06.02 SOB (SHORTNESS OF BREATH) ON EXERTION: ICD-10-CM

## 2022-07-18 RX ORDER — LEVOTHYROXINE SODIUM 0.07 MG/1
75 TABLET ORAL DAILY
Qty: 90 TABLET | Refills: 1 | Status: SHIPPED | OUTPATIENT
Start: 2022-07-18 | End: 2022-10-24

## 2022-07-18 NOTE — TELEPHONE ENCOUNTER
Caller: Tasneem Valentino    Relationship: Self    Best call back number: [phone]    What test was performed: CHEST XRAY    When was the test performed: THURSDAY    Where was the test performed: [facility]    Additional notes: PLEASE CALL PATIENT TO REVIEW TEST RESULTS

## 2022-07-21 DIAGNOSIS — R06.02 SOB (SHORTNESS OF BREATH) ON EXERTION: Primary | ICD-10-CM

## 2022-07-29 ENCOUNTER — TRANSCRIBE ORDERS (OUTPATIENT)
Dept: ADMINISTRATIVE | Facility: HOSPITAL | Age: 57
End: 2022-07-29

## 2022-07-29 DIAGNOSIS — R06.02 SHORTNESS OF BREATH: Primary | ICD-10-CM

## 2022-07-29 DIAGNOSIS — Z01.818 PRE-OP TESTING: ICD-10-CM

## 2022-07-29 DIAGNOSIS — R53.83 TIREDNESS: ICD-10-CM

## 2022-07-29 DIAGNOSIS — R76.0 RAISED ANTIBODY TITER: ICD-10-CM

## 2022-08-01 DIAGNOSIS — R06.02 SOB (SHORTNESS OF BREATH) ON EXERTION: ICD-10-CM

## 2022-08-01 DIAGNOSIS — R91.8 MULTIPLE PULMONARY NODULES: Primary | ICD-10-CM

## 2022-09-12 ENCOUNTER — LAB (OUTPATIENT)
Dept: LAB | Facility: HOSPITAL | Age: 57
End: 2022-09-12

## 2022-09-12 DIAGNOSIS — Z01.818 PREOP EXAMINATION: Primary | ICD-10-CM

## 2022-09-12 LAB — SARS-COV-2 ORF1AB RESP QL NAA+PROBE: NOT DETECTED

## 2022-09-12 PROCEDURE — U0004 COV-19 TEST NON-CDC HGH THRU: HCPCS

## 2022-09-12 PROCEDURE — C9803 HOPD COVID-19 SPEC COLLECT: HCPCS

## 2022-09-14 ENCOUNTER — HOSPITAL ENCOUNTER (OUTPATIENT)
Dept: RESPIRATORY THERAPY | Facility: HOSPITAL | Age: 57
Discharge: HOME | End: 2022-09-14
Admitting: NURSE PRACTITIONER

## 2022-09-14 DIAGNOSIS — R06.02 SHORTNESS OF BREATH: ICD-10-CM

## 2022-09-14 DIAGNOSIS — R76.0 RAISED ANTIBODY TITER: ICD-10-CM

## 2022-09-14 DIAGNOSIS — R53.83 TIREDNESS: ICD-10-CM

## 2022-09-14 PROCEDURE — 94726 PLETHYSMOGRAPHY LUNG VOLUMES: CPT

## 2022-09-14 PROCEDURE — 94729 DIFFUSING CAPACITY: CPT

## 2022-09-14 PROCEDURE — 94060 EVALUATION OF WHEEZING: CPT

## 2022-09-14 RX ORDER — ALBUTEROL SULFATE 90 UG/1
2 AEROSOL, METERED RESPIRATORY (INHALATION) ONCE
Status: COMPLETED | OUTPATIENT
Start: 2022-09-14 | End: 2022-09-14

## 2022-09-14 RX ADMIN — ALBUTEROL SULFATE 2 PUFF: 108 INHALANT RESPIRATORY (INHALATION) at 15:23

## 2022-09-26 NOTE — PROGRESS NOTES
I have reviewed patient's PFT report.  She does have an appointment with Dr. Saunders tomorrow who will explain this report more thoroughly.  It does look like she will benefit from bronchodilator therapy.  Dr. Saunders will discuss tomorrow inhalers to manage this.

## 2022-09-27 ENCOUNTER — OFFICE VISIT (OUTPATIENT)
Dept: PULMONOLOGY | Facility: HOSPITAL | Age: 57
End: 2022-09-27

## 2022-09-27 VITALS
DIASTOLIC BLOOD PRESSURE: 82 MMHG | SYSTOLIC BLOOD PRESSURE: 135 MMHG | RESPIRATION RATE: 12 BRPM | WEIGHT: 157 LBS | HEIGHT: 67 IN | OXYGEN SATURATION: 99 % | BODY MASS INDEX: 24.64 KG/M2 | HEART RATE: 57 BPM

## 2022-09-27 DIAGNOSIS — M32.9 SYSTEMIC LUPUS ERYTHEMATOSUS, UNSPECIFIED SLE TYPE, UNSPECIFIED ORGAN INVOLVEMENT STATUS: ICD-10-CM

## 2022-09-27 DIAGNOSIS — R06.02 SOB (SHORTNESS OF BREATH) ON EXERTION: Primary | ICD-10-CM

## 2022-09-27 DIAGNOSIS — R91.1 SOLITARY LUNG NODULE: ICD-10-CM

## 2022-09-27 DIAGNOSIS — J45.40 MODERATE PERSISTENT ASTHMA WITHOUT COMPLICATION: ICD-10-CM

## 2022-09-27 PROCEDURE — G0463 HOSPITAL OUTPT CLINIC VISIT: HCPCS

## 2022-09-27 RX ORDER — DULOXETIN HYDROCHLORIDE 60 MG/1
60 CAPSULE, DELAYED RELEASE ORAL DAILY
COMMUNITY
Start: 2022-09-13

## 2022-09-27 NOTE — PROGRESS NOTES
SLEEP/PULMONARY  CLINIC NOTE      PATIENT IDENTIFICATION:  Name: Tasneem Valentino  Age: 57 y.o.  Sex: female  :  1965  MRN: [id number]    DATE OF CONSULTATION:  2022                     CHIEF COMPLAINT: Shortness of breath    History of Present Illness:   Tasneem Valentino is a 57 y.o. female relatively non-smoker, she has family history of asthma her daughter and her brother, has been using her albuterol inhaler on and off for shortness of breath which explained that for going uphill start getting more short of breath but denies any chest pain no palpitation no sputum's no hemoptysis no fever no chills  Patient is here for runny nose,  Patient history of lupus which causing some complication cardiac and kidney failure, she has a cardiac echo was done a year ago did not show pulmonary hypertension  Patient has CAT scan to evaluate for her shortness of breath showed small lung nodule 4 mm and less result discussed with the patient in detail just nonspecific  Patient has a pulmonary function test showing small airway obstructive lung disease      Review of Systems:   Constitutional: negative   Eyes: negative   ENT/oropharynx: negative   Cardiovascular: negative   Respiratory: negative   Gastrointestinal: negative   Genitourinary: negative   Neurological: negative   Musculoskeletal: negative   Integument/breast: negative   Endocrine: negative   Allergic/Immunologic: negative     Past Medical History:  Past Medical History:   Diagnosis Date   • COVID-19 2021   • COVID-19 2022   • Headache    • Heart disease    • Hypertension    • Kidney disease    • Sjogren's disease (HCC)    • Thyroid disease        Past Surgical History:  Past Surgical History:   Procedure Laterality Date   • APPENDECTOMY     • CARDIAC CATHETERIZATION     • CHOLECYSTECTOMY     • OCCIPITAL NEURECTOMY     • OVARY SURGERY          Family History:  Family History   Problem Relation Age of Onset   • Hypertension Mother    • Cancer  "Father    • Arthritis Sister    • Heart disease Brother    • No Known Problems Brother    • No Known Problems Brother         Social History:   Social History     Tobacco Use   • Smoking status: Never Smoker   • Smokeless tobacco: Never Used   Substance Use Topics   • Alcohol use: No        Allergies:  Allergies   Allergen Reactions   • Contrast Dye Itching and Shortness Of Breath   • Sulfa Antibiotics Swelling   • Tizanidine Mental Status Change and Hallucinations   • Zanaflex [Tizanidine Hcl] Mental Status Change   • Iodinated Diagnostic Agents Itching       Home Meds:  (Not in a hospital admission)      Objective:    Vitals Ranges:   Heart Rate:  [57] 57  Resp:  [12] 12  BP: (135)/(82) 135/82  Body mass index is 24.59 kg/m².     Exam:  General Appearance:  WDWN    HEENT:   without obvious abnormality,  Conjunctiva/corneas clear,  Normal external ear canals, no drainage    Clear orsalmucosa,  Mallampati score 3    Neck:  Supple, symmetrical, trachea midline. No JVD.  Lungs:   Bilateral basal rhonchi bilaterally, respirations unlabored symmetrical wall movement.    Chest wall:  No tenderness or deformity.    Heart:  Regular rate and rhythm, S1 and S2 normal.  Extremities: Trace edema no clubbing or Cyanosis        Data Review:  All labs (24hrs): No results found for this or any previous visit (from the past 24 hour(s)).     Imaging:  Full Pulmonary Function Test With Bronchodilator  Interpretation pulmonary function test    PATIENT IDENTIFICATION:  Name: Tasneem Valentino  Age: 57 y.o.  Sex: female  :  1965  MRN: [id number]  Date Of Test: 2022  Indication: Shortness of breath     Weight 158 lb Height 67\" BSA 1.84    1. The spirometry showing the patient has   normal FEV1 FVC ratio a normal   value, no segment changes bronchodilator, decreased FEF 25-75% reflecting   small airway obstructive lung disease    2. Lung volumes within normal limits.    3. Diffusion capacity  decreased to 61 of predicted " corrected to 62%  For ventilated alveoli   4. Volume loops relatively normal    Impression: This pulmonary function test showing that  the patient has   moderate obstructive lung disease still benefiting from maximizing   bronchodilator    Peter Saunders MD. D, ABSM.  9/20/2022  14:34 EDT            ASSESSMENT:  Diagnoses and all orders for this visit:    SOB (shortness of breath) on exertion  -     Adult Transthoracic Echo Complete W/ Cont if Necessary Per Protocol; Future    Systemic lupus erythematosus, unspecified SLE type, unspecified organ involvement status (HCC)    Moderate persistent asthma without complication    Solitary lung nodule  -     CT Chest Without Contrast Diagnostic; Future    Other orders  -     DULoxetine (CYMBALTA) 60 MG capsule; Take 60 mg by mouth Daily.        PLAN:  We will get a 2D echo to evaluate for her shortness of breath    Bronchodilator inhaled corticosteroid we will add Breztri    Education how to use inhalers    Encouraged to use incentive spirometer    Continue to exercise slowly as tolerated    Monitor for any change in the color of the sputum    Avoid any exposure to fumes, gas or any irritant    Follow-up CAT scan in 1 year  Assured the patient that less likely to be malignancy  Ensure the patient record her respiratory status most likely due to her underlying asthma      Follow-up 3 3 weeks    MD. TISH Shultz, ABSM.  9/27/2022  10:40 EDT

## 2022-09-28 RX ORDER — BUDESONIDE, GLYCOPYRROLATE, AND FORMOTEROL FUMARATE 160; 9; 4.8 UG/1; UG/1; UG/1
2 AEROSOL, METERED RESPIRATORY (INHALATION) 2 TIMES DAILY
Qty: 1 EACH | Refills: 0 | COMMUNITY
Start: 2022-09-28 | End: 2023-01-26

## 2022-10-06 ENCOUNTER — OFFICE VISIT (OUTPATIENT)
Dept: PAIN MEDICINE | Facility: CLINIC | Age: 57
End: 2022-10-06

## 2022-10-06 VITALS
RESPIRATION RATE: 16 BRPM | SYSTOLIC BLOOD PRESSURE: 136 MMHG | HEART RATE: 62 BPM | OXYGEN SATURATION: 99 % | DIASTOLIC BLOOD PRESSURE: 69 MMHG

## 2022-10-06 DIAGNOSIS — Z79.899 HIGH RISK MEDICATION USE: Primary | ICD-10-CM

## 2022-10-06 DIAGNOSIS — M47.812 CERVICAL SPONDYLOSIS WITHOUT MYELOPATHY: ICD-10-CM

## 2022-10-06 DIAGNOSIS — M54.2 CERVICALGIA: Primary | ICD-10-CM

## 2022-10-06 DIAGNOSIS — R51.9 CHRONIC DAILY HEADACHE: ICD-10-CM

## 2022-10-06 DIAGNOSIS — M54.12 CERVICAL RADICULOPATHY: ICD-10-CM

## 2022-10-06 DIAGNOSIS — M48.02 NEUROFORAMINAL STENOSIS OF CERVICAL SPINE: ICD-10-CM

## 2022-10-06 DIAGNOSIS — M25.50 PAIN IN JOINT INVOLVING MULTIPLE SITES: ICD-10-CM

## 2022-10-06 PROCEDURE — 99213 OFFICE O/P EST LOW 20 MIN: CPT | Performed by: PHYSICAL MEDICINE & REHABILITATION

## 2022-10-06 RX ORDER — HYDROCODONE BITARTRATE AND ACETAMINOPHEN 10; 325 MG/1; MG/1
1 TABLET ORAL EVERY 6 HOURS PRN
Qty: 120 TABLET | Refills: 0 | Status: SHIPPED | OUTPATIENT
Start: 2022-10-06 | End: 2023-01-05 | Stop reason: SDUPTHER

## 2022-10-06 RX ORDER — HYDROCODONE BITARTRATE AND ACETAMINOPHEN 10; 325 MG/1; MG/1
1 TABLET ORAL EVERY 6 HOURS PRN
Qty: 120 TABLET | Refills: 0 | Status: SHIPPED | OUTPATIENT
Start: 2022-10-06 | End: 2022-11-08 | Stop reason: SDUPTHER

## 2022-10-06 RX ORDER — SUMATRIPTAN 100 MG/1
100 TABLET, FILM COATED ORAL ONCE AS NEEDED
Qty: 9 TABLET | Refills: 3 | Status: SHIPPED | OUTPATIENT
Start: 2022-10-06 | End: 2023-01-05 | Stop reason: SDUPTHER

## 2022-10-06 NOTE — PROGRESS NOTES
Subjective   Tasneem Valentino is a 57 y.o. female.     History of Present Illness  Chronic daily headaches, also widespread joint pain with SLE, 10/10 at worst, 1/10 at best, always present, varies, aching, stabbing, worse with weather changes, interferes with ADLs, sleep, failed chiropractor, PT, massage, occipital neurotomy, headaches stim trial. MRI brain wnl. Prior notes reviewed, as above, was seeing Dr. Laguerre with Norco 10mg QID prn and Imitrex 100mg #9/month with some relief, also started Amovig 70mg, uses Zofran 4mg prn. No FH of substance abuse. 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 Had 3 cervical ESIs, symptoms essentially resolved after 1st, improved after 2nd, weakness worsening with a lot of lifting at work. Getting out of breath with any exercise, dx with COPD and asthma but nonsmoker, being worked up, has pending 2D echo.       The following portions of the patient's history were reviewed and updated as appropriate: allergies, current medications, past family history, past medical history, past social history, past surgical history and problem list.    Review of Systems   Constitutional: Negative for chills, fatigue and fever.   HENT: Positive for hearing loss. Negative for trouble swallowing.    Eyes: Positive for visual disturbance.   Respiratory: Negative for shortness of breath.    Cardiovascular: Negative for chest pain.   Gastrointestinal: Positive for constipation. Negative for abdominal pain, diarrhea, nausea and vomiting.   Genitourinary: Negative for urinary incontinence.   Musculoskeletal: Negative for arthralgias, back pain, joint swelling, myalgias and neck pain.   Neurological: Positive for dizziness and headache. Negative for weakness and numbness.       Objective   Physical Exam   Constitutional: She is oriented to person, place, and time. She appears well-developed and well-nourished.   HENT:    Head: Normocephalic and atraumatic.   Eyes: Pupils are equal, round, and reactive to light.   Cardiovascular: Normal rate and regular rhythm.   Murmur heard.  Pulmonary/Chest: Breath sounds normal.   Abdominal: Soft. Bowel sounds are normal. She exhibits no distension. There is no abdominal tenderness.   Neurological: She is alert and oriented to person, place, and time. She has normal reflexes. She displays normal reflexes. No sensory deficit.   Psychiatric: Her behavior is normal. Thought content normal.         Assessment & Plan   Diagnoses and all orders for this visit:    1. Cervicalgia (Primary)    2. Cervical spondylosis without myelopathy    3. Cervical radiculopathy    4. Chronic daily headache    5. Neuroforaminal stenosis of cervical spine    6. Pain in joint involving multiple sites        UDS in order 10/21/21. Inspect reviewed, in order.  Treatment plan will consist of continuing current medication as long as it remains effective and is necessary, while evaluating patient at each visit and determining if the medication can be lowered or discontinued, while also using nonopioid therapies to reduce reliance on opioids.  Cont Norco 10mg QID prn, Imitrex 100mg #9.  Began Zofran 4mg TID prn.  Had 3 cervical ESIs per MRI. No contrast per allergy. Some discomfort with left paramedian approach, will perform on right in future.  Failed headache procedures.  RTC in 3 months for f/u.

## 2022-10-19 ENCOUNTER — HOSPITAL ENCOUNTER (OUTPATIENT)
Dept: CARDIOLOGY | Facility: HOSPITAL | Age: 57
Discharge: HOME | End: 2022-10-19
Admitting: INTERNAL MEDICINE

## 2022-10-19 VITALS
HEIGHT: 67 IN | BODY MASS INDEX: 24.64 KG/M2 | SYSTOLIC BLOOD PRESSURE: 149 MMHG | WEIGHT: 157 LBS | DIASTOLIC BLOOD PRESSURE: 51 MMHG

## 2022-10-19 DIAGNOSIS — R06.02 SOB (SHORTNESS OF BREATH) ON EXERTION: ICD-10-CM

## 2022-10-19 LAB
BH CV ECHO MEAS - AI P1/2T: 615.3 MSEC
BH CV ECHO MEAS - AO MAX PG: 5.5 MMHG
BH CV ECHO MEAS - AO MEAN PG: 3 MMHG
BH CV ECHO MEAS - AO ROOT DIAM: 3.1 CM
BH CV ECHO MEAS - AO V2 MAX: 117.1 CM/SEC
BH CV ECHO MEAS - AO V2 VTI: 25.4 CM
BH CV ECHO MEAS - AVA(I,D): 1.97 CM2
BH CV ECHO MEAS - EDV(CUBED): 81.3 ML
BH CV ECHO MEAS - EDV(MOD-SP4): 60.4 ML
BH CV ECHO MEAS - EF(MOD-BP): 60 %
BH CV ECHO MEAS - EF(MOD-SP4): 60.3 %
BH CV ECHO MEAS - ESV(CUBED): 22.3 ML
BH CV ECHO MEAS - ESV(MOD-SP4): 24 ML
BH CV ECHO MEAS - FS: 35 %
BH CV ECHO MEAS - IVS/LVPW: 1.18 CM
BH CV ECHO MEAS - IVSD: 0.98 CM
BH CV ECHO MEAS - LA DIMENSION: 3.1 CM
BH CV ECHO MEAS - LV DIASTOLIC VOL/BSA (35-75): 33.1 CM2
BH CV ECHO MEAS - LV MASS(C)D: 126.3 GRAMS
BH CV ECHO MEAS - LV MAX PG: 2.21 MMHG
BH CV ECHO MEAS - LV MEAN PG: 1.28 MMHG
BH CV ECHO MEAS - LV SYSTOLIC VOL/BSA (12-30): 13.1 CM2
BH CV ECHO MEAS - LV V1 MAX: 74.4 CM/SEC
BH CV ECHO MEAS - LV V1 VTI: 16.2 CM
BH CV ECHO MEAS - LVIDD: 4.3 CM
BH CV ECHO MEAS - LVIDS: 2.8 CM
BH CV ECHO MEAS - LVOT AREA: 3.1 CM2
BH CV ECHO MEAS - LVOT DIAM: 1.98 CM
BH CV ECHO MEAS - LVPWD: 0.83 CM
BH CV ECHO MEAS - MR MAX PG: 118.6 MMHG
BH CV ECHO MEAS - MR MAX VEL: 544.6 CM/SEC
BH CV ECHO MEAS - MV A MAX VEL: 54.6 CM/SEC
BH CV ECHO MEAS - MV DEC SLOPE: 420.8 CM/SEC2
BH CV ECHO MEAS - MV DEC TIME: 0.2 MSEC
BH CV ECHO MEAS - MV E MAX VEL: 84.2 CM/SEC
BH CV ECHO MEAS - MV E/A: 1.54
BH CV ECHO MEAS - MV MAX PG: 4.6 MMHG
BH CV ECHO MEAS - MV MEAN PG: 2 MMHG
BH CV ECHO MEAS - MV V2 VTI: 33.9 CM
BH CV ECHO MEAS - MVA(VTI): 1.47 CM2
BH CV ECHO MEAS - PA V2 MAX: 101.4 CM/SEC
BH CV ECHO MEAS - PI END-D VEL: 89.6 CM/SEC
BH CV ECHO MEAS - PULM A REVS DUR: 0.11 SEC
BH CV ECHO MEAS - PULM A REVS VEL: 29.2 CM/SEC
BH CV ECHO MEAS - PULM DIAS VEL: 27.6 CM/SEC
BH CV ECHO MEAS - PULM S/D: 2.23
BH CV ECHO MEAS - PULM SYS VEL: 61.7 CM/SEC
BH CV ECHO MEAS - RAP SYSTOLE: 3 MMHG
BH CV ECHO MEAS - RV MAX PG: 0.66 MMHG
BH CV ECHO MEAS - RV V1 MAX: 40.7 CM/SEC
BH CV ECHO MEAS - RV V1 VTI: 6.8 CM
BH CV ECHO MEAS - RVDD: 2.5 CM
BH CV ECHO MEAS - RVSP: 31 MMHG
BH CV ECHO MEAS - SI(MOD-SP4): 20 ML/M2
BH CV ECHO MEAS - SV(LVOT): 50 ML
BH CV ECHO MEAS - SV(MOD-SP4): 36.4 ML
BH CV ECHO MEAS - TR MAX PG: 28 MMHG
BH CV ECHO MEAS - TR MAX VEL: 264.3 CM/SEC
MAXIMAL PREDICTED HEART RATE: 164 BPM
STRESS TARGET HR: 139 BPM

## 2022-10-19 PROCEDURE — 93306 TTE W/DOPPLER COMPLETE: CPT | Performed by: INTERNAL MEDICINE

## 2022-10-19 PROCEDURE — 93306 TTE W/DOPPLER COMPLETE: CPT

## 2022-10-24 RX ORDER — LEVOTHYROXINE SODIUM 0.07 MG/1
75 TABLET ORAL DAILY
Qty: 90 TABLET | Refills: 1 | Status: SHIPPED | OUTPATIENT
Start: 2022-10-24 | End: 2023-01-26 | Stop reason: SDUPTHER

## 2022-10-28 ENCOUNTER — OFFICE VISIT (OUTPATIENT)
Dept: FAMILY MEDICINE CLINIC | Facility: CLINIC | Age: 57
End: 2022-10-28

## 2022-10-28 VITALS
HEART RATE: 64 BPM | SYSTOLIC BLOOD PRESSURE: 106 MMHG | BODY MASS INDEX: 24.96 KG/M2 | HEIGHT: 67 IN | DIASTOLIC BLOOD PRESSURE: 64 MMHG | OXYGEN SATURATION: 100 % | WEIGHT: 159 LBS | TEMPERATURE: 97.6 F

## 2022-10-28 DIAGNOSIS — J02.9 SORE THROAT: ICD-10-CM

## 2022-10-28 DIAGNOSIS — J01.00 ACUTE NON-RECURRENT MAXILLARY SINUSITIS: Primary | ICD-10-CM

## 2022-10-28 DIAGNOSIS — R49.0 HOARSE VOICE QUALITY: ICD-10-CM

## 2022-10-28 DIAGNOSIS — Z76.0 MEDICATION REFILL: ICD-10-CM

## 2022-10-28 LAB
EXPIRATION DATE: NORMAL
FLUAV AG UPPER RESP QL IA.RAPID: NOT DETECTED
FLUBV AG UPPER RESP QL IA.RAPID: NOT DETECTED
INTERNAL CONTROL: NORMAL
Lab: NORMAL
SARS-COV-2 AG UPPER RESP QL IA.RAPID: NOT DETECTED

## 2022-10-28 PROCEDURE — 87428 SARSCOV & INF VIR A&B AG IA: CPT | Performed by: NURSE PRACTITIONER

## 2022-10-28 PROCEDURE — 99213 OFFICE O/P EST LOW 20 MIN: CPT | Performed by: NURSE PRACTITIONER

## 2022-10-28 RX ORDER — VALACYCLOVIR HYDROCHLORIDE 1 G/1
TABLET, FILM COATED ORAL
COMMUNITY
Start: 2022-10-11 | End: 2022-11-08

## 2022-10-28 RX ORDER — NITROGLYCERIN 0.4 MG/1
0.4 TABLET SUBLINGUAL
Qty: 20 TABLET | Refills: 1 | Status: SHIPPED | OUTPATIENT
Start: 2022-10-28

## 2022-10-28 RX ORDER — AZITHROMYCIN 250 MG/1
TABLET, FILM COATED ORAL
Qty: 6 TABLET | Refills: 0 | Status: SHIPPED | OUTPATIENT
Start: 2022-10-28 | End: 2022-11-02

## 2022-10-28 RX ORDER — METHYLPREDNISOLONE 4 MG/1
TABLET ORAL
Qty: 21 EACH | Refills: 0 | Status: SHIPPED | OUTPATIENT
Start: 2022-10-28 | End: 2022-11-02

## 2022-10-28 NOTE — PROGRESS NOTES
"Chief Complaint  Sore Throat    Subjective          Tasneem Valentino presents to [facility]      History of Present Illness    Tasneem is a 57-year-old female patient who presents today with complaints of sore throat.    She tells me symptoms began over a week ago.  She has been with PND and congestion.  She has a cough at nighttime that is productive.  Mucus color ranges from white to yellow.  Voice is of hoarse quality today.  She reports this began on Tuesday of this week.  She has been taking Robitussin OTC that has helped with some of the drainage.  She currently denies any fevers, chills, N/V/D.  She is afebrile today.  Blood pressure stable.  Good oxygenation.  Patient tested for flu and COVID both of which were negative in office today.    She does request a refill of her nitroglycerin.      Objective     Vital Signs:   /64 (BP Location: Right arm, Patient Position: Sitting, Cuff Size: Adult)   Pulse 64   Temp 97.6 °F (36.4 °C) (Oral)   Ht 170.2 cm (67.01\")   Wt 72.1 kg (159 lb)   SpO2 100%   BMI 24.90 kg/m²           Physical Exam  Vitals reviewed.   Constitutional:       Appearance: She is well-developed.      Comments: Wearing a face mask     HENT:      Head: Normocephalic and atraumatic.      Right Ear: Ear canal and external ear normal. Tenderness present. A middle ear effusion is present. There is no impacted cerumen.      Left Ear: Tympanic membrane, ear canal and external ear normal. There is no impacted cerumen.      Nose: Congestion and rhinorrhea present. Rhinorrhea is purulent.      Right Turbinates: Enlarged.      Left Turbinates: Enlarged.      Right Sinus: Maxillary sinus tenderness and frontal sinus tenderness present.      Left Sinus: No maxillary sinus tenderness or frontal sinus tenderness.      Mouth/Throat:      Lips: Pink. No lesions.      Mouth: Mucous membranes are moist. No injury, oral lesions or angioedema.      Tongue: No lesions.      " Palate: No lesions.      Pharynx: Posterior oropharyngeal erythema present. No oropharyngeal exudate or uvula swelling.      Tonsils: No tonsillar exudate or tonsillar abscesses.      Comments: PND noted posterior pharynx with cobblestoning  Eyes:      Conjunctiva/sclera: Conjunctivae normal.      Pupils: Pupils are equal, round, and reactive to light.   Cardiovascular:      Rate and Rhythm: Normal rate and regular rhythm.      Pulses: Normal pulses.      Heart sounds: Normal heart sounds.   Pulmonary:      Effort: Pulmonary effort is normal. No respiratory distress.      Breath sounds: Normal breath sounds.   Abdominal:      General: Abdomen is flat. Bowel sounds are normal. There is no distension.      Palpations: Abdomen is soft.   Musculoskeletal:         General: Normal range of motion.      Cervical back: Normal range of motion.   Skin:     General: Skin is warm and dry.      Findings: No rash.   Neurological:      Mental Status: She is alert and oriented to person, place, and time.   Psychiatric:         Behavior: Behavior normal.                Result Review :                                   Assessment and Plan      Diagnoses and all orders for this visit:    1. Acute non-recurrent maxillary sinusitis (Primary)    2. Sore throat  -     POCT SARS-CoV-2 Antigen DANY    3. Medication refill    4. Hoarse voice quality    Other orders  -     nitroglycerin (NITROSTAT) 0.4 MG SL tablet; Place 1 tablet under the tongue Every 5 (Five) Minutes As Needed for Chest Pain. Take no more than 3 doses in 15 minutes.  Dispense: 20 tablet; Refill: 1  -     azithromycin (Zithromax Z-Raúl) 250 MG tablet; Take 2 tablets the first day, then 1 tablet daily for 4 days.  Dispense: 6 tablet; Refill: 0  -     methylPREDNISolone (MEDROL) 4 MG dose pack; Take as directed on package instructions.  Dispense: 21 each; Refill: 0            Follow Up       No follow-ups on file.      Patient was given instructions and counseling regarding her  condition or for health maintenance advice. Please see specific information pulled into the AVS if appropriate.     Piper Levine, APRN10/28/202208:34 EDT  This note has been electronically signed

## 2022-11-08 ENCOUNTER — OFFICE VISIT (OUTPATIENT)
Dept: PULMONOLOGY | Facility: HOSPITAL | Age: 57
End: 2022-11-08

## 2022-11-08 VITALS
OXYGEN SATURATION: 99 % | DIASTOLIC BLOOD PRESSURE: 70 MMHG | SYSTOLIC BLOOD PRESSURE: 117 MMHG | HEART RATE: 57 BPM | HEIGHT: 67 IN | WEIGHT: 159 LBS | BODY MASS INDEX: 24.96 KG/M2 | RESPIRATION RATE: 14 BRPM

## 2022-11-08 DIAGNOSIS — R91.1 SOLITARY LUNG NODULE: ICD-10-CM

## 2022-11-08 DIAGNOSIS — J45.40 MODERATE PERSISTENT ASTHMA WITHOUT COMPLICATION: Primary | ICD-10-CM

## 2022-11-08 PROCEDURE — G0463 HOSPITAL OUTPT CLINIC VISIT: HCPCS

## 2022-11-08 RX ORDER — BUDESONIDE, GLYCOPYRROLATE, AND FORMOTEROL FUMARATE 160; 9; 4.8 UG/1; UG/1; UG/1
2 AEROSOL, METERED RESPIRATORY (INHALATION) 2 TIMES DAILY
Qty: 1 EACH | Refills: 11 | Status: SHIPPED | OUTPATIENT
Start: 2022-11-08 | End: 2022-12-13

## 2022-11-08 NOTE — PROGRESS NOTES
SLEEP/PULMONARY  CLINIC NOTE      PATIENT IDENTIFICATION:  Name: Tasneem Valentino  Age: 57 y.o.  Sex: female  :  1965  MRN: [id number]    DATE OF CONSULTATION:  2022                     CHIEF COMPLAINT: Asthma     History of Present Illness:   Tasneem Valentino is a 57 y.o. female patient with PFT showing that she has small airway obstructive lung disease with improvement with bronchodilators and clinically she gives symptoms of asthma she was on Breztri if she was feeling significantly better but she ran out of it  Patient reporting that her episode of sudden onset of dizziness sweating and reported that her color turned pale for few minutes then recover she is currently on Coreg and Cardizem and being followed by cardiologist  Patient complain from dryness in her mouth and she reporting that she has Sjogren's syndrome      Review of Systems:   Constitutional:  As above   Eyes: negative   ENT/oropharynx: negative   Cardiovascular: negative   Respiratory:  As above   Gastrointestinal: negative   Genitourinary: negative   Neurological: negative   Musculoskeletal: negative   Integument/breast: negative   Endocrine: negative   Allergic/Immunologic: negative     Past Medical History:  Past Medical History:   Diagnosis Date   • COVID-19 2021   • COVID-19 2022   • Headache    • Heart disease    • Hypertension    • Kidney disease    • Sjogren's disease (HCC)    • Solitary lung nodule 2022   • Thyroid disease        Past Surgical History:  Past Surgical History:   Procedure Laterality Date   • APPENDECTOMY     • CARDIAC CATHETERIZATION     • CHOLECYSTECTOMY     • OCCIPITAL NEURECTOMY     • OVARY SURGERY          Family History:  Family History   Problem Relation Age of Onset   • Hypertension Mother    • Cancer Father    • Arthritis Sister    • Heart disease Brother    • No Known Problems Brother    • No Known Problems Brother         Social History:   Social History     Tobacco Use   • Smoking  status: Never     Passive exposure: Never   • Smokeless tobacco: Never   Substance Use Topics   • Alcohol use: No        Allergies:  Allergies   Allergen Reactions   • Contrast Dye Itching and Shortness Of Breath   • Sulfa Antibiotics Swelling   • Tizanidine Mental Status Change and Hallucinations   • Iodinated Diagnostic Agents Itching       Home Meds:  (Not in a hospital admission)      Objective:    Vitals Ranges:   Heart Rate:  [57] 57  Resp:  [14] 14  BP: (117)/(70) 117/70  Body mass index is 24.9 kg/m².     Exam:  General Appearance:  WDWN    HEENT:   without obvious abnormality,  Conjunctiva/corneas clear,  Normal external ear canals, no drainage    Clear orsalmucosa,  Mallampati score 3    Neck:  Supple, symmetrical, trachea midline. No JVD.  Lungs:   Bilateral basal rhonchi bilaterally, respirations unlabored symmetrical wall movement.    Chest wall:  No tenderness or deformity.    Heart:  Regular rate and rhythm, S1 and S2 normal.  Extremities: Trace edema no clubbing or Cyanosis        Data Review:  All labs (24hrs): No results found for this or any previous visit (from the past 24 hour(s)).     Imaging:  Adult Transthoracic Echo Complete W/ Cont if Necessary Per Protocol  •  Estimated left ventricular EF was in agreement with the calculated left   ventricular EF. Left ventricular ejection fraction appears to be 61 - 65%.   Left ventricular systolic function is normal.  •  Left ventricular diastolic function was normal.  •  The right ventricular cavity is borderline dilated.  •  Estimated right ventricular systolic pressure from tricuspid   regurgitation is normal (<35 mmHg).       ASSESSMENT:  Diagnoses and all orders for this visit:    Moderate persistent asthma without complication    Solitary lung nodule    Other orders  -     Budeson-Glycopyrrol-Formoterol (Breztri Aerosphere) 160-9-4.8 MCG/ACT aerosol inhaler; Inhale 2 puffs 2 (Two) Times a Day.        PLAN:  Her next CAT scan is going to be in  September we will follow-up after that    Bronchodilator inhaled corticosteroid continue Breo 3    Education how to use inhalers    Encouraged to use incentive spirometer    Continue to exercise slowly as tolerated    Monitor for any change in the color of the sputum    Avoid any exposure to fumes, gas or any irritant        Follow-up 1 year    Peter Saunders MD. D, ABSM.  11/8/2022  10:38 EST

## 2022-12-12 ENCOUNTER — TELEPHONE (OUTPATIENT)
Dept: FAMILY MEDICINE CLINIC | Facility: CLINIC | Age: 57
End: 2022-12-12

## 2022-12-12 NOTE — TELEPHONE ENCOUNTER
I SPOKE WITH PATIENT, ADVISED HER WE DO NOT HAVE ANY [facility] PAPERWORK HERE FOR HER. I ADVISED HER IT WILL TAKE UP TO 14 DAYS TO COMPLETE, BY LAW WE HAVE 14 DAYS. AND MIRIAM CHARGES A $20 FEE FOR [facility] PAPERWORK THAT WE CAN TAKE CARE AT HER UPCOMING APPT ON 12/13.

## 2022-12-12 NOTE — TELEPHONE ENCOUNTER
Hub staff attempted to follow warm transfer process and was unsuccessful     Caller: Tasneem Valentino    Relationship to patient: Self    Best call back number: [phone]    Patient is needing: TO KNOW IF THE PRACTICE RECEIVED THE FMLA PAPERWORK. HAS AN APPOINTMENT 12/13/22 TO HAVE IT FILLED OUT.      CALL BACK IS NEEDED TO CONFIRM RECEIPT OF PAPERWORK

## 2022-12-13 ENCOUNTER — OFFICE VISIT (OUTPATIENT)
Dept: FAMILY MEDICINE CLINIC | Facility: CLINIC | Age: 57
End: 2022-12-13

## 2022-12-13 VITALS
HEART RATE: 70 BPM | TEMPERATURE: 98.2 F | HEIGHT: 67 IN | DIASTOLIC BLOOD PRESSURE: 60 MMHG | BODY MASS INDEX: 25.08 KG/M2 | SYSTOLIC BLOOD PRESSURE: 102 MMHG | OXYGEN SATURATION: 99 % | WEIGHT: 159.8 LBS

## 2022-12-13 DIAGNOSIS — R42 DIZZINESS: Primary | ICD-10-CM

## 2022-12-13 PROBLEM — J06.9 UPPER RESPIRATORY TRACT INFECTION: Status: RESOLVED | Noted: 2021-11-08 | Resolved: 2022-12-13

## 2022-12-13 PROBLEM — R09.81 SINUS CONGESTION: Status: RESOLVED | Noted: 2021-11-08 | Resolved: 2022-12-13

## 2022-12-13 PROBLEM — J01.01 ACUTE RECURRENT MAXILLARY SINUSITIS: Status: RESOLVED | Noted: 2022-02-10 | Resolved: 2022-12-13

## 2022-12-13 PROBLEM — J01.00 ACUTE NON-RECURRENT MAXILLARY SINUSITIS: Status: RESOLVED | Noted: 2022-05-11 | Resolved: 2022-12-13

## 2022-12-13 PROCEDURE — 99213 OFFICE O/P EST LOW 20 MIN: CPT | Performed by: NURSE PRACTITIONER

## 2022-12-13 NOTE — PROGRESS NOTES
"Chief Complaint  fmla    Subjective          Tasneem Valentino presents to [facility]      History of Present Illness     Tasneem is a 57-year-old female patient who presents today for FMLA paperwork.    Patient saw cardiology about 20 days ago. She c/o dizziness at that appt. The following day after her appt, she was with collapse while at work but no LOC. Event monitor was ordered after she went to ER for evaluation and cardiology followed her.  She continues to wear and indicates she has about 9 days left of the monitor. 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     Patient did have an echocardiogram back in October.  This showed normal EF of 61 to 65%.  Right ventricular cavity borderline dilated.  No tricuspid regurgitation.    She did see Dr. Saunders with pulmonary. PFT showed that she was with small obstructive lung disease with improvement from bronchodilator.  He diagnosed her with asthma. She started Breztri but notes the inhaler is not working or improving her issues.  She has noticed no changes in her breathing or worsening symptoms of SOB.  She does not want to continue the Breztri.  Dr Saunders advised her to follow-up in 1 year.    She does have a hx of Ménière's disease. She has not had an issues with Ménière's in years. She indicates these new \"episodes\" do occur during the day and not in the evening or night. She is always active when events occur. She is never at rest when things happen. She does report getting hot, shaky, and tremor-like prior to these episodes. After they occur, she is \"exhausted\" which is not like the Meineres episodes she has previously. She indicates three separate events since event monitor has been placed. She is eager to see if anything has been captured on the recording. When she is home resting she is not having issues at all.     Blood pressure is 102/60 today.  She is without any dizziness or other issues today.She is on Cardizem 360 " "mg a day.    Objective     Vital Signs:   /60 (BP Location: Left arm, Patient Position: Sitting, Cuff Size: Adult)   Pulse 70   Temp 98.2 °F (36.8 °C) (Oral)   Ht 170.2 cm (67.01\")   Wt 72.5 kg (159 lb 12.8 oz)   SpO2 99%   BMI 25.02 kg/m²         Physical Exam  Constitutional:       Appearance: Normal appearance. She is well-developed.      Comments: Wearing a face mask     HENT:      Head: Normocephalic and atraumatic.      Nose: Nose normal.      Mouth/Throat:      Mouth: Mucous membranes are moist.      Pharynx: Oropharynx is clear.   Eyes:      Conjunctiva/sclera: Conjunctivae normal.   Cardiovascular:      Rate and Rhythm: Normal rate and regular rhythm.      Pulses: Normal pulses.      Heart sounds: Normal heart sounds.   Pulmonary:      Effort: Pulmonary effort is normal. No respiratory distress.      Breath sounds: Normal breath sounds.   Abdominal:      General: Bowel sounds are normal.      Palpations: Abdomen is soft.   Musculoskeletal:         General: Normal range of motion.      Cervical back: Normal range of motion.   Skin:     General: Skin is warm and dry.      Findings: No rash.   Neurological:      General: No focal deficit present.      Mental Status: She is alert and oriented to person, place, and time.      GCS: GCS eye subscore is 4. GCS verbal subscore is 5. GCS motor subscore is 6.      Cranial Nerves: Cranial nerves 2-12 are intact.      Sensory: Sensation is intact.      Motor: Motor function is intact.      Coordination: Coordination is intact.      Gait: Gait is intact.   Psychiatric:         Behavior: Behavior normal.                Result Review :                                   Assessment and Plan      Diagnoses and all orders for this visit:    1. Dizziness (Primary)  -     Ambulatory Referral to Neurosurgery      I am not certain this patient's dizzy spells are cardiac or neurologically related. Cardiac is currently working her up with the event monitor.  We will " await to see what that returns.  In the meantime, I will go ahead and place referral to neurology just in case cardiology evaluation returns normal.  I wonder if she is having any seizure-like activity due to the lingering fatigue after these episodes and the tremoring like that she is experiencing.  An EEG might be helpful in this situation however we will just get her to neurology and they can evaluate if anything seems significant.              Follow Up       No follow-ups on file.      Patient was given instructions and counseling regarding her condition or for health maintenance advice. Please see specific information pulled into the AVS if appropriate.     Piper Levine, APRN12/13/202211:12 EST  This note has been electronically signed

## 2022-12-15 DIAGNOSIS — R42 DIZZINESS: Primary | ICD-10-CM

## 2022-12-29 ENCOUNTER — TELEPHONE (OUTPATIENT)
Dept: FAMILY MEDICINE CLINIC | Facility: CLINIC | Age: 57
End: 2022-12-29

## 2022-12-29 NOTE — TELEPHONE ENCOUNTER
Pt states she cannot get in to see the neurologist until May.  What are her options?  She really isn't wanting to wait that long.  Is there any options?

## 2022-12-29 NOTE — TELEPHONE ENCOUNTER
If she would like to call around and see if there is a different neurologist [place] or otherwise that would be held

## 2022-12-29 NOTE — TELEPHONE ENCOUNTER
I sent patient a [place] msg advising her to contact her insurance provider to get neurologists in her plan and then contact them and find out next available appt. She will contact them and let me know where she would like referral sent.

## 2023-01-05 ENCOUNTER — OFFICE VISIT (OUTPATIENT)
Dept: PAIN MEDICINE | Facility: CLINIC | Age: 58
End: 2023-01-05
Payer: COMMERCIAL

## 2023-01-05 VITALS
RESPIRATION RATE: 16 BRPM | SYSTOLIC BLOOD PRESSURE: 120 MMHG | OXYGEN SATURATION: 99 % | DIASTOLIC BLOOD PRESSURE: 73 MMHG | HEART RATE: 69 BPM

## 2023-01-05 DIAGNOSIS — M54.2 CERVICALGIA: Primary | ICD-10-CM

## 2023-01-05 DIAGNOSIS — M25.50 PAIN IN JOINT INVOLVING MULTIPLE SITES: ICD-10-CM

## 2023-01-05 DIAGNOSIS — M54.12 CERVICAL RADICULOPATHY: ICD-10-CM

## 2023-01-05 DIAGNOSIS — M48.02 NEUROFORAMINAL STENOSIS OF CERVICAL SPINE: ICD-10-CM

## 2023-01-05 DIAGNOSIS — M47.812 CERVICAL SPONDYLOSIS WITHOUT MYELOPATHY: ICD-10-CM

## 2023-01-05 DIAGNOSIS — R51.9 CHRONIC DAILY HEADACHE: ICD-10-CM

## 2023-01-05 PROCEDURE — 99213 OFFICE O/P EST LOW 20 MIN: CPT | Performed by: PHYSICAL MEDICINE & REHABILITATION

## 2023-01-05 RX ORDER — SUMATRIPTAN 100 MG/1
100 TABLET, FILM COATED ORAL ONCE AS NEEDED
Qty: 9 TABLET | Refills: 3 | Status: SHIPPED | OUTPATIENT
Start: 2023-01-05 | End: 2023-04-03 | Stop reason: SDUPTHER

## 2023-01-05 RX ORDER — HYDROCODONE BITARTRATE AND ACETAMINOPHEN 10; 325 MG/1; MG/1
1 TABLET ORAL EVERY 6 HOURS PRN
Qty: 120 TABLET | Refills: 0 | Status: SHIPPED | OUTPATIENT
Start: 2023-01-05 | End: 2023-04-03 | Stop reason: SDUPTHER

## 2023-01-12 ENCOUNTER — TELEPHONE (OUTPATIENT)
Dept: FAMILY MEDICINE CLINIC | Facility: CLINIC | Age: 58
End: 2023-01-12

## 2023-01-12 NOTE — TELEPHONE ENCOUNTER
Hub staff attempted to follow warm transfer process and was unsuccessful     Caller: Tasneem Valentino    Relationship to patient: Self    Best call back number: [phone]    Patient is needing: THE PATIENT STATES THAT HER [facility] PAPERWORK STILL HAS NOT BEEN RECEIVED. THE PATIENT NEEDS THIS PAPERWORK FAXED TO EITHER [phone]. PLEASE ADVISE.

## 2023-01-26 ENCOUNTER — TELEPHONE (OUTPATIENT)
Dept: FAMILY MEDICINE CLINIC | Facility: CLINIC | Age: 58
End: 2023-01-26

## 2023-01-26 ENCOUNTER — OFFICE VISIT (OUTPATIENT)
Dept: FAMILY MEDICINE CLINIC | Facility: CLINIC | Age: 58
End: 2023-01-26
Payer: COMMERCIAL

## 2023-01-26 VITALS
HEART RATE: 86 BPM | HEIGHT: 67 IN | DIASTOLIC BLOOD PRESSURE: 68 MMHG | TEMPERATURE: 98.2 F | BODY MASS INDEX: 24.92 KG/M2 | WEIGHT: 158.8 LBS | OXYGEN SATURATION: 99 % | SYSTOLIC BLOOD PRESSURE: 108 MMHG

## 2023-01-26 DIAGNOSIS — R06.09 DOE (DYSPNEA ON EXERTION): Primary | ICD-10-CM

## 2023-01-26 DIAGNOSIS — J45.909 MILD ASTHMA WITHOUT COMPLICATION, UNSPECIFIED WHETHER PERSISTENT: ICD-10-CM

## 2023-01-26 DIAGNOSIS — R42 INTERMITTENT LIGHTHEADEDNESS: ICD-10-CM

## 2023-01-26 DIAGNOSIS — E03.9 ACQUIRED HYPOTHYROIDISM: ICD-10-CM

## 2023-01-26 DIAGNOSIS — R91.8 MULTIPLE PULMONARY NODULES: ICD-10-CM

## 2023-01-26 PROCEDURE — 99214 OFFICE O/P EST MOD 30 MIN: CPT | Performed by: NURSE PRACTITIONER

## 2023-01-26 RX ORDER — BUDESONIDE, GLYCOPYRROLATE, AND FORMOTEROL FUMARATE 160; 9; 4.8 UG/1; UG/1; UG/1
2 AEROSOL, METERED RESPIRATORY (INHALATION) 2 TIMES DAILY
Qty: 1 EACH | Refills: 0 | COMMUNITY
Start: 2023-01-26 | End: 2023-02-23 | Stop reason: SINTOL

## 2023-01-26 RX ORDER — LEVOTHYROXINE SODIUM 0.07 MG/1
75 TABLET ORAL DAILY
Qty: 90 TABLET | Refills: 1 | Status: SHIPPED | OUTPATIENT
Start: 2023-01-26

## 2023-01-26 RX ORDER — DILTIAZEM HYDROCHLORIDE 60 MG/1
2 TABLET, FILM COATED ORAL
Qty: 10.2 G | Refills: 1 | Status: SHIPPED | OUTPATIENT
Start: 2023-01-26 | End: 2023-01-26

## 2023-01-26 NOTE — PROGRESS NOTES
"Chief Complaint  Dizziness    Subjective          Tasneem Valentino presents to [facility]      History of Present Illness     Tasneem is a 58 year old female patient who presents toay with folow up on dizziness.     She is still with lightheadedness, intermittently. During the last occurrence last week, she was able to sit down before passing out. She sat for a few minutes and then laid down for about 30 minutes. Laying down did not exacerbate the symptoms. \"It's not my Mieneres, I am not spinning ever\". She has not had an episode of Menieres in years.  She has yet to see neurology. Patient does have an appointment to see March 20, 2023 neurology for her dizziness, Dr. Santiago.    She is with tremor bilateral hands. Intermittent. At times worse than others. He mother has a history of medication induced Parkinsons. Brother has essential tremor. She has been without CP.     She is not wheezing. Her SOB is exertional only. This occurs most often when cleaning the house or going up stairs. She has to take frequent breaks when doing chores and was previously able to do this without stopping before. Patient saw pulmonary back in December 13, 2022. She saw Dr. Lui Saunders.  PFT showed that she was with small obstructive lung disease that showed improvement with bronchodilator. She was diagnosed with asthma and started on Breztri 160-90-4 0.8 mcg per ACT twice daily not improving her symptoms. Advised her to follow-up in 1 year.  I am going to place her on a different inhaler, Symbicort.    She has seen cardiology, Dr. David Davies in [place], and wore an event monitor for some time. She was advised it was not cardiac and return to PCP.      Patient in need of Synthroid refill.    Objective     Vital Signs:   /68 (BP Location: Left arm, Patient Position: Sitting, Cuff Size: Adult)   Pulse 86   Temp 98.2 °F (36.8 °C) (Oral)   Ht 170.2 cm (67.01\")   Wt 72 kg (158 lb 12.8 oz)   " SpO2 99%   BMI 24.87 kg/m²           Physical Exam  Vitals reviewed.   Constitutional:       Appearance: She is well-developed.      Comments: Wearing a face mask     HENT:      Head: Normocephalic and atraumatic.      Nose: Nose normal.      Mouth/Throat:      Mouth: Mucous membranes are moist.      Pharynx: Oropharynx is clear.   Eyes:      Conjunctiva/sclera: Conjunctivae normal.   Cardiovascular:      Rate and Rhythm: Normal rate and regular rhythm.      Pulses: Normal pulses.      Heart sounds: Normal heart sounds.   Pulmonary:      Effort: Pulmonary effort is normal. No respiratory distress.      Breath sounds: Normal breath sounds. No wheezing, rhonchi or rales.   Chest:      Chest wall: No tenderness.   Abdominal:      General: Abdomen is flat. Bowel sounds are normal. There is no distension.      Palpations: There is no mass.      Tenderness: There is no abdominal tenderness. There is no guarding or rebound.      Hernia: No hernia is present.   Musculoskeletal:         General: Normal range of motion.      Cervical back: Normal range of motion.   Skin:     General: Skin is warm and dry.      Findings: No rash.   Neurological:      General: No focal deficit present.      Mental Status: She is alert and oriented to person, place, and time.   Psychiatric:         Behavior: Behavior normal.                Result Review :                                   Assessment and Plan      Diagnoses and all orders for this visit:    1. MELTON (dyspnea on exertion) (Primary)  -     Symbicort 80-4.5 MCG/ACT inhaler; Inhale 2 puffs 2 (Two) Times a Day.  Dispense: 10.2 g; Refill: 1    2. Multiple pulmonary nodules    3. Acquired hypothyroidism    4. Intermittent lightheadedness    Other orders  -     levothyroxine (SYNTHROID, LEVOTHROID) 75 MCG tablet; Take 1 tablet by mouth Daily.  Dispense: 90 tablet; Refill: 1      Patient has had a history of COVID in which the symptoms of MELTON developed shortly after.  This is caused her to  be unable to perform work-related tasks such as CPR.  She was diagnosed by pulmonary with asthma however, patient reports the Breztri has not helped any.  Going to place her on Symbicort to see if there is any benefit from that to help with the MELTON.  Advised patient to use as directed up until next appointment.  I did provide a refill in case there were any issues getting here due to weather or otherwise.  She does have an underlying history of pulmonary nodules.      She is with intermittent lightheadedness chronic that causes her to feel unwell and need to lay down or at times become faint.  She has been worked up by cardiology who says this is not a cardiac issue.  Patient reports her rheumatologist believes this is a pulmonary hypertension issue based on her autoimmune condition of lupus and Lyme disease.    We are still waiting work-up from neurology.  Patient has an appointment in March.  We will have her follow-up with me in a month to see how she is doing with the daily.  Patient to keep log of any lightheadedness or other symptoms that develop.    Going to refill her Synthroid for his hypothyroidism.          Follow Up       No follow-ups on file.      Patient was given instructions and counseling regarding her condition or for health maintenance advice. Please see specific information pulled into the AVS if appropriate.     Piper Levine, APRN1/26/202312:02 EST  This note has been electronically signed

## 2023-01-26 NOTE — TELEPHONE ENCOUNTER
Caller: Tasneem Valentino NICK    Relationship: Self    Best call back number: [phone]     What medications are you currently taking:   Current Outpatient Medications on File Prior to Visit   Medication Sig Dispense Refill   • albuterol sulfate  (90 Base) MCG/ACT inhaler Inhale 2 puffs Every 4 (Four) Hours As Needed for Wheezing. 18 g 1   • Cetirizine HCl 10 MG capsule Take 10 mg by mouth Daily.     • dilTIAZem CD (CARDIZEM CD) 360 MG 24 hr capsule Take 360 mg by mouth Daily.     • diphenhydrAMINE (BENADRYL) 25 MG tablet Take 25 mg by mouth At Night As Needed.     • DULoxetine (CYMBALTA) 60 MG capsule Take 60 mg by mouth Daily.     • fluticasone (FLONASE) 50 MCG/ACT nasal spray 2 sprays into the nostril(s) as directed by provider As Needed for Rhinitis.     • HYDROcodone-acetaminophen (Norco)  MG per tablet Take 1 tablet by mouth Every 6 (Six) Hours As Needed for Moderate Pain. 120 tablet 0   • HYDROcodone-acetaminophen (Norco)  MG per tablet Take 1 tablet by mouth Every 6 (Six) Hours As Needed for Moderate Pain. 120 tablet 0   • HYDROcodone-acetaminophen (Norco)  MG per tablet Take 1 tablet by mouth Every 6 (Six) Hours As Needed for Moderate Pain. 120 tablet 0   • hydroxychloroquine (PLAQUENIL) 200 MG tablet Take 200 mg by mouth 2 (Two) Times a Day.     • levothyroxine (SYNTHROID, LEVOTHROID) 75 MCG tablet Take 1 tablet by mouth Daily. 90 tablet 1   • Magnesium 500 MG capsule Take 500 mg by mouth Daily.     • meclizine (ANTIVERT) 25 MG tablet Take 25 mg by mouth 3 (Three) Times a Day As Needed.     • Melatonin 5 MG capsule Take 5 mg by mouth Every Night.     • nitroglycerin (NITROSTAT) 0.4 MG SL tablet Place 1 tablet under the tongue Every 5 (Five) Minutes As Needed for Chest Pain. Take no more than 3 doses in 15 minutes. 20 tablet 1   • omeprazole (priLOSEC) 20 MG capsule Take 1 capsule by mouth Daily. 90 capsule 3   • ondansetron (ZOFRAN) 4 MG tablet Take 1 tablet by mouth Every 8 (Eight)  Hours As Needed for Nausea or Vomiting. 90 tablet 11   • SUMAtriptan (IMITREX) 100 MG tablet Take 1 tablet by mouth 1 (One) Time As Needed for Migraine. Take one tablet at onset of headache. May repeat dose one time in 2 hours if headache not relieved. 9 tablet 3   • Symbicort 80-4.5 MCG/ACT inhaler Inhale 2 puffs 2 (Two) Times a Day. 10.2 g 1   • topiramate (TOPAMAX) 100 MG tablet Take 1 tablet by mouth 2 (Two) Times a Day. 180 tablet 3   • [DISCONTINUED] Budeson-Glycopyrrol-Formoterol (Breztri Aerosphere) 160-9-4.8 MCG/ACT aerosol inhaler Inhale 2 puffs 2 (Two) Times a Day. Indications: Lot#[id number] Exp; 4/20/25 1 each 0   • [DISCONTINUED] levothyroxine (SYNTHROID, LEVOTHROID) 75 MCG tablet TAKE 1 TABLET BY MOUTH DAILY 90 tablet 1     No current facility-administered medications on file prior to visit.          ADDITIONAL NOTES:    PATIENT COULD NOT AFFORD THE SYMBICORT AND DID NOT PICK IT UP FROM THE PHARMACY.    PATIENT WANTS TO KNOW DO YOU WANT HER TO TRY THE BREZPRI AGAIN SHE STILL HAS SOME OF THAT LEFT OR DO YOU WANT TO TRY SOMETHING DIFFERENT.    PLEASE ADVISE.

## 2023-01-26 NOTE — TELEPHONE ENCOUNTER
Yes please have her try the Breztri.  I will send in a new prescription for the Breztri and cancelled symbicort

## 2023-02-01 ENCOUNTER — TELEPHONE (OUTPATIENT)
Dept: FAMILY MEDICINE CLINIC | Facility: CLINIC | Age: 58
End: 2023-02-01

## 2023-02-07 ENCOUNTER — TELEPHONE (OUTPATIENT)
Dept: FAMILY MEDICINE CLINIC | Facility: CLINIC | Age: 58
End: 2023-02-07

## 2023-02-07 NOTE — TELEPHONE ENCOUNTER
Caller: Tasneem Valentino    Relationship: Self    Best call back number: [phone]    What form or medical record are you requesting: SHORT TERM DISABILITY    Who is requesting this form or medical record from you: [employer]    How would you like to receive the form or medical records (pick-up, mail, fax): FAX    If fax, what is the fax number: [phone]      Timeframe paperwork needed: ASAP    Additional notes: [employer] STATES THAT THE PROVIDER FAILED TO CHART HOW BAD PATIENT'S DIZZINESS REALLY IS RELATED TO PERFORMING WORK FUNCTIONS.     PATIENT NEEDS DOCUMENTATION OF [employer]OPE EVENTS

## 2023-02-07 NOTE — TELEPHONE ENCOUNTER
HUB TO READ  I called patient as soon as this msg came thru at 4:20. I left VM that she could call tomorrow or send [employer] msg because phones will stop ringing in at 4:30.    I need more details as this has been faxed multiple times, the last time was Feb 1. Piper will not be back in the office to discuss this with until Thursday.

## 2023-02-14 ENCOUNTER — TELEPHONE (OUTPATIENT)
Dept: FAMILY MEDICINE CLINIC | Facility: CLINIC | Age: 58
End: 2023-02-14
Payer: COMMERCIAL

## 2023-02-14 NOTE — TELEPHONE ENCOUNTER
Caller: [employer]    Relationship:     Best call back number: [phone]      What was the call regarding: [employer] NEEDS THE SHORT TERM DISABILITY PAPERWORK FAXED TO THEM ONCE COMPLETED.  FAX # [phone]    Do you require a callback: YES        DELETE AFTER READING TO PATIENT: “ Thank you for sharing this information with me. I will send a message to the . Please allow up to 48 hours for the  to follow up on this request.”

## 2023-02-23 ENCOUNTER — OFFICE VISIT (OUTPATIENT)
Dept: FAMILY MEDICINE CLINIC | Facility: CLINIC | Age: 58
End: 2023-02-23
Payer: COMMERCIAL

## 2023-02-23 VITALS
SYSTOLIC BLOOD PRESSURE: 138 MMHG | HEIGHT: 67 IN | HEART RATE: 71 BPM | WEIGHT: 158 LBS | TEMPERATURE: 98 F | OXYGEN SATURATION: 99 % | DIASTOLIC BLOOD PRESSURE: 70 MMHG | BODY MASS INDEX: 24.8 KG/M2

## 2023-02-23 DIAGNOSIS — R42 DIZZINESS: ICD-10-CM

## 2023-02-23 DIAGNOSIS — R42 INTERMITTENT LIGHTHEADEDNESS: ICD-10-CM

## 2023-02-23 DIAGNOSIS — R06.09 DOE (DYSPNEA ON EXERTION): Primary | ICD-10-CM

## 2023-02-23 PROCEDURE — 99213 OFFICE O/P EST LOW 20 MIN: CPT | Performed by: NURSE PRACTITIONER

## 2023-02-23 NOTE — PROGRESS NOTES
Chief Complaint  Dizziness    Subjective          Tasneem Valentino presents to [facility]      History of Present Illness    Tasneem is a 58-year-old female patient who presents today for 1 month follow-up on MELTON, lightheadedness, dizziness.    Historically, patient had COVID last year in which symptoms of MELTON developed shortly after.  We have been working on getting her FMLA/disability approved as she is unable to perform work-related task.  She has been having episodes of intermittent lightheadedness that is chronic and unexpected.  This often causes her to feel unwell and near fainting sensation.    During the past two weeks she has been with four episodes of lightheadedness. She She feels an aura like sensation before these occasions. No LOC during these episodes. She avoids driving or performing any activities during this period. She does need a few hours to recover after these episodes as symptoms cause fatigue afterwards and somewhat foggy minded.    She stopped taking the Breztri as she went to her opthalmologist due to double vision. Double vision has stopped. She is still SOB but only on exertion. Is able to perform light walking but going up stairs and inclines or performing extended activities she gets winded and needs to rest.  She does recover after resting for a period of time.    Her short term disability has not been approved.  She does report she has assistance from an individual helping with this process.    She does have a history of lupus and Lyme disease.Rheumatology stated this was a pulmonary issue.      Patient saw cardiology who ruled out cardiac issue.    Patient has appointment for neurology in March so she is yet to be seen or evaluated by them.    Dr. Good has increased her to stage 3 CKD. She is with a mild anemia but this is not the cause her of above noted issues. He also decreased her Cardizem to 240 mg. She will follow him again in August.  "      Objective     Vital Signs:   /70 (BP Location: Left arm, Patient Position: Sitting, Cuff Size: Adult)   Pulse 71   Temp 98 °F (36.7 °C) (Oral)   Ht 170.2 cm (67.01\")   Wt 71.7 kg (158 lb)   SpO2 99%   BMI 24.74 kg/m²           Physical Exam  Vitals reviewed.   Constitutional:       Appearance: She is well-developed.      Comments: Wearing a face mask     HENT:      Head: Normocephalic and atraumatic.      Nose: Nose normal.      Mouth/Throat:      Mouth: Mucous membranes are moist.      Pharynx: Oropharynx is clear.   Eyes:      Conjunctiva/sclera: Conjunctivae normal.      Pupils: Pupils are equal, round, and reactive to light.   Cardiovascular:      Rate and Rhythm: Normal rate and regular rhythm.      Pulses: Normal pulses.      Heart sounds: Normal heart sounds. No murmur heard.  Pulmonary:      Effort: Pulmonary effort is normal. No respiratory distress.      Breath sounds: Normal breath sounds.   Musculoskeletal:         General: Normal range of motion.      Cervical back: Normal range of motion.   Skin:     General: Skin is warm and dry.      Findings: No rash.   Neurological:      General: No focal deficit present.      Mental Status: She is alert and oriented to person, place, and time.      GCS: GCS eye subscore is 4. GCS verbal subscore is 5. GCS motor subscore is 6.      Motor: Motor function is intact.      Coordination: Coordination is intact.      Gait: Gait is intact.   Psychiatric:         Behavior: Behavior normal.                Result Review :                                   Assessment and Plan      Diagnoses and all orders for this visit:    1. MELTON (dyspnea on exertion) (Primary)    2. Intermittent lightheadedness    3. Dizziness      Patient symptoms above MELTON, intermittent lightheadedness and dizziness continue to persist.  No worsening exacerbation of symptoms but no improvement overall either.  We will hold off on adding another inhaler.  Patient was having side effects " from the [facility].  May consider Trelegy inhaler in the future however, we will see what neurology has to say.  We have had her evaluated by several specialists.  She does have an appoint with neurology soon for further evaluation of neurological status.  We will continue to see her monthly at the request of her employer.        Follow Up       No follow-ups on file.      Patient was given instructions and counseling regarding her condition or for health maintenance advice. Please see specific information pulled into the AVS if appropriate.     Piper Levine, APRN2/23/202320:33 EST  This note has been electronically signed      Answers for HPI/ROS submitted by the patient on 2/21/2023  Please describe your symptoms.: Continued symptoms passing out no loc, Tremors of hands  Have you had these symptoms before?: Yes  How long have you been having these symptoms?: Greater than 2 weeks  What is the primary reason for your visit?: Other

## 2023-03-20 ENCOUNTER — TRANSCRIBE ORDERS (OUTPATIENT)
Dept: ADMINISTRATIVE | Facility: HOSPITAL | Age: 58
End: 2023-03-20
Payer: COMMERCIAL

## 2023-03-20 DIAGNOSIS — G40.201 PARTIAL SYMPTOMATIC EPILEPSY WITH COMPLEX PARTIAL SEIZURES, NOT INTRACTABLE, WITH STATUS EPILEPTICUS: Primary | ICD-10-CM

## 2023-03-23 ENCOUNTER — OFFICE VISIT (OUTPATIENT)
Dept: FAMILY MEDICINE CLINIC | Facility: CLINIC | Age: 58
End: 2023-03-23
Payer: COMMERCIAL

## 2023-03-23 VITALS
DIASTOLIC BLOOD PRESSURE: 76 MMHG | HEART RATE: 72 BPM | SYSTOLIC BLOOD PRESSURE: 112 MMHG | OXYGEN SATURATION: 100 % | WEIGHT: 158.2 LBS | BODY MASS INDEX: 24.83 KG/M2 | TEMPERATURE: 97.6 F | HEIGHT: 67 IN

## 2023-03-23 DIAGNOSIS — G40.209 PARTIAL SYMPTOMATIC EPILEPSY WITH COMPLEX PARTIAL SEIZURES, NOT INTRACTABLE, WITHOUT STATUS EPILEPTICUS: Primary | ICD-10-CM

## 2023-03-23 DIAGNOSIS — J01.00 ACUTE NON-RECURRENT MAXILLARY SINUSITIS: ICD-10-CM

## 2023-03-23 DIAGNOSIS — R42 INTERMITTENT LIGHTHEADEDNESS: ICD-10-CM

## 2023-03-23 PROCEDURE — 99214 OFFICE O/P EST MOD 30 MIN: CPT | Performed by: NURSE PRACTITIONER

## 2023-03-23 RX ORDER — AMOXICILLIN AND CLAVULANATE POTASSIUM 875; 125 MG/1; MG/1
1 TABLET, FILM COATED ORAL 2 TIMES DAILY
Qty: 14 TABLET | Refills: 0 | Status: SHIPPED | OUTPATIENT
Start: 2023-03-23 | End: 2023-04-03

## 2023-03-23 NOTE — PROGRESS NOTES
"Chief Complaint  Sore Throat, Headache, Shortness of Breath (With exertion), Nasal Congestion, and Hoarse    Subjective          Tasneem Valentino presents to [facility]      History of Present Illness    Tasneem is a 58-year-old female patient who is here for 1 month follow-up.  She is additionally with some acute illness.    There is an appointment with Dr. Santiago 3/20/2023 and was dx with partial symptomatic epilepsy with complex partial seizures, not intractable, without status epilepticus.  He ordered an EEG and an MRI of the brain with and without for seizure protocol.  He started topiramate 100 mg in the morning and 150 mg at night ultimately having her increase to 150 twice daily.  Patient has been advised to avoid engaging in activities such as climbing, operating machinery, swimming, bathing without supervision, driving a motor vehicle unless she is seizure-free for 90 days.  Once imaging is obtained and evaluated Dr. Santiago will see her again and discuss findings. She is having EEG March 28, 2023 and MRI March 27, 2023.     Patient reports she has had some sinus issues since early last week.  She has been with congestion, sinus pressure, yellow mucus and intermittent headache.  She has been taking OTC Robitussin with minimal improvement.  She is prone to seasonal sinus issues.        Objective     Vital Signs:   /76 (BP Location: Right arm, Patient Position: Sitting, Cuff Size: Adult)   Pulse 72   Temp 97.6 °F (36.4 °C) (Oral)   Ht 170.2 cm (67.01\")   Wt 71.8 kg (158 lb 3.2 oz)   SpO2 100%   BMI 24.77 kg/m²           Physical Exam  Vitals reviewed.   Constitutional:       Appearance: She is well-developed.      Comments: Wearing a face mask     HENT:      Head: Normocephalic and atraumatic.      Nose: Congestion and rhinorrhea present. Rhinorrhea is purulent.      Right Turbinates: Enlarged.      Left Turbinates: Enlarged.      Right Sinus: Maxillary sinus tenderness and " frontal sinus tenderness present.      Left Sinus: Maxillary sinus tenderness present. No frontal sinus tenderness.      Mouth/Throat:      Lips: Pink. No lesions.      Tongue: No lesions.      Palate: No lesions.      Pharynx: Oropharynx is clear. Posterior oropharyngeal erythema present. No oropharyngeal exudate.      Tonsils: No tonsillar exudate.      Comments: Thick yellow PND noted  Eyes:      Conjunctiva/sclera: Conjunctivae normal.      Pupils: Pupils are equal, round, and reactive to light.   Cardiovascular:      Rate and Rhythm: Normal rate and regular rhythm.      Pulses: Normal pulses.      Heart sounds: Normal heart sounds. No murmur heard.  Pulmonary:      Effort: Pulmonary effort is normal. No respiratory distress.      Breath sounds: Normal breath sounds.   Musculoskeletal:         General: Normal range of motion.      Cervical back: Normal range of motion.   Skin:     General: Skin is warm and dry.      Findings: No rash.   Neurological:      General: No focal deficit present.      Mental Status: She is alert and oriented to person, place, and time.      GCS: GCS eye subscore is 4. GCS verbal subscore is 5. GCS motor subscore is 6.      Motor: Motor function is intact.      Coordination: Coordination is intact.      Gait: Gait is intact.   Psychiatric:         Attention and Perception: Attention normal.         Mood and Affect: Mood normal. Affect is flat.         Speech: Speech normal.         Behavior: Behavior normal. Behavior is cooperative.         Thought Content: Thought content normal.                Result Review :                       Assessment and Plan      Diagnoses and all orders for this visit:    1. Partial symptomatic epilepsy with complex partial seizures, not intractable, without status epilepticus (HCC) (Primary)    2. Intermittent lightheadedness    3. Acute non-recurrent maxillary sinusitis  -     amoxicillin-clavulanate (Augmentin) 875-125 MG per tablet; Take 1 tablet by  mouth 2 (Two) Times a Day.  Dispense: 14 tablet; Refill: 0      I will that we referred to neurology for consult.  There was suspicion that this was an underlying neurological issue.  Neurology is going to work-up accordingly.  Patient has not had any episodes since starting the topiramate however, she is still in the phase of potential seizure activity and needs to be avoiding all activities that could be harmful for 90 days.  This time off could be extended if seizure activity continues.  This condition is newly diagnosed and has uncertain prognosis.  Hopefully we can get her under control with medication management and avoid seizure activity altogether.  Advised patient we would know more information once imaging is obtained and further discussed by neurology.    Examination reveals sinusitis.  Will treat as above patient to return if any worsening symptoms.        Follow Up       No follow-ups on file.      Patient was given instructions and counseling regarding her condition or for health maintenance advice. Please see specific information pulled into the AVS if appropriate.     Piper Levine, APRN3/23/202313:14 EDT  This note has been electronically signed

## 2023-03-28 ENCOUNTER — HOSPITAL ENCOUNTER (OUTPATIENT)
Dept: NEUROLOGY | Facility: HOSPITAL | Age: 58
Discharge: HOME | End: 2023-03-28
Admitting: PSYCHIATRY & NEUROLOGY
Payer: COMMERCIAL

## 2023-03-28 DIAGNOSIS — G40.201 PARTIAL SYMPTOMATIC EPILEPSY WITH COMPLEX PARTIAL SEIZURES, NOT INTRACTABLE, WITH STATUS EPILEPTICUS: ICD-10-CM

## 2023-03-28 PROCEDURE — 95813 EEG EXTND MNTR 61-119 MIN: CPT

## 2023-03-28 PROCEDURE — 95813 EEG EXTND MNTR 61-119 MIN: CPT | Performed by: PSYCHIATRY & NEUROLOGY

## 2023-04-03 ENCOUNTER — OFFICE VISIT (OUTPATIENT)
Dept: PAIN MEDICINE | Facility: CLINIC | Age: 58
End: 2023-04-03
Payer: COMMERCIAL

## 2023-04-03 VITALS
OXYGEN SATURATION: 99 % | HEART RATE: 82 BPM | SYSTOLIC BLOOD PRESSURE: 132 MMHG | RESPIRATION RATE: 16 BRPM | DIASTOLIC BLOOD PRESSURE: 85 MMHG

## 2023-04-03 DIAGNOSIS — M47.812 CERVICAL SPONDYLOSIS WITHOUT MYELOPATHY: ICD-10-CM

## 2023-04-03 DIAGNOSIS — M54.12 CERVICAL RADICULOPATHY: ICD-10-CM

## 2023-04-03 DIAGNOSIS — R51.9 CHRONIC DAILY HEADACHE: ICD-10-CM

## 2023-04-03 DIAGNOSIS — M25.50 PAIN IN JOINT INVOLVING MULTIPLE SITES: ICD-10-CM

## 2023-04-03 DIAGNOSIS — M54.2 CERVICALGIA: Primary | ICD-10-CM

## 2023-04-03 PROCEDURE — 99213 OFFICE O/P EST LOW 20 MIN: CPT | Performed by: PHYSICAL MEDICINE & REHABILITATION

## 2023-04-03 PROCEDURE — G0463 HOSPITAL OUTPT CLINIC VISIT: HCPCS | Performed by: PHYSICAL MEDICINE & REHABILITATION

## 2023-04-03 RX ORDER — HYDROCODONE BITARTRATE AND ACETAMINOPHEN 10; 325 MG/1; MG/1
1 TABLET ORAL EVERY 6 HOURS PRN
Qty: 120 TABLET | Refills: 0 | Status: SHIPPED | OUTPATIENT
Start: 2023-04-03

## 2023-04-03 RX ORDER — SUMATRIPTAN 100 MG/1
100 TABLET, FILM COATED ORAL ONCE AS NEEDED
Qty: 9 TABLET | Refills: 5 | Status: SHIPPED | OUTPATIENT
Start: 2023-04-03

## 2023-04-03 RX ORDER — ONDANSETRON 4 MG/1
4 TABLET, FILM COATED ORAL EVERY 8 HOURS PRN
Qty: 90 TABLET | Refills: 11 | Status: SHIPPED | OUTPATIENT
Start: 2023-04-03

## 2023-04-03 NOTE — PROGRESS NOTES
Subjective   Tasneem Valentino is a 58 y.o. female.     History of Present Illness  Chronic daily headaches, also widespread joint pain with SLE, 10/10 at worst, 1/10 at best, 7/10 today, always present, varies, aching, stabbing, worse with weather changes, interferes with ADLs, sleep, failed chiropractor, PT, massage, occipital neurotomy, headaches stim trial. MRI brain wnl. Prior notes reviewed, as above, was seeing Dr. Laguerre with Norco 10mg QID prn and Imitrex 100mg #9/month with some relief, also started Amovig 70mg, uses Zofran 4mg prn. No FH of substance abuse. 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 Had 3 cervical ESIs, symptoms essentially resolved after 1st, improved after 2nd, weakness worsening with a lot of lifting at work. Getting out of breath with any exercise, dx with COPD and asthma but nonsmoker, being worked up, had unremarkable 2D echo and Holter monitor, going to see Neuro, off work on FMLA for now, Neuro dx her with seizures with abnormal EEG, increased Topamax used for migraine prophylaxis now also controlling seizures.       The following portions of the patient's history were reviewed and updated as appropriate: allergies, current medications, past family history, past medical history, past social history, past surgical history and problem list.    Review of Systems   Constitutional: Negative for chills, fatigue and fever.   HENT: Positive for hearing loss. Negative for trouble swallowing.    Eyes: Positive for visual disturbance.   Respiratory: Negative for shortness of breath.    Cardiovascular: Negative for chest pain.   Gastrointestinal: Positive for constipation. Negative for abdominal pain, diarrhea, nausea and vomiting.   Genitourinary: Negative for urinary incontinence.   Musculoskeletal: Negative for arthralgias, back pain, joint swelling, myalgias and neck pain.   Neurological: Positive for  dizziness and headache. Negative for weakness and numbness.       Objective   Physical Exam   Constitutional: She is oriented to person, place, and time. She appears well-developed and well-nourished.   HENT:   Head: Normocephalic and atraumatic.   Eyes: Pupils are equal, round, and reactive to light.   Cardiovascular: Normal rate and regular rhythm.   Murmur heard.  Pulmonary/Chest: Breath sounds normal.   Abdominal: Soft. Bowel sounds are normal. She exhibits no distension. There is no abdominal tenderness.   Neurological: She is alert and oriented to person, place, and time. She has normal reflexes. She displays normal reflexes. No sensory deficit.   Psychiatric: Her behavior is normal. Thought content normal.         Assessment & Plan   Diagnoses and all orders for this visit:    1. Cervicalgia (Primary)    2. Cervical spondylosis without myelopathy    3. Cervical radiculopathy    4. Chronic daily headache    5. Pain in joint involving multiple sites        UDS in order 10/6/22. Inspect reviewed, in order.  Treatment plan will consist of continuing current medication as long as it remains effective and is necessary, while evaluating patient at each visit and determining if the medication can be lowered or discontinued, while also using nonopioid therapies to reduce reliance on opioids.  Cont Norco 10mg QID prn, Imitrex 100mg #9. Filled Norco 3/23/23 per new Inspect.  Began Zofran 4mg TID prn.  Had 3 cervical ESIs per MRI. No contrast per allergy. Some discomfort with left paramedian approach, will perform on right in future.  Failed headache procedures.  RTC in 3 months for f/u.

## 2023-04-07 RX ORDER — OMEPRAZOLE 20 MG/1
20 CAPSULE, DELAYED RELEASE ORAL DAILY
Qty: 90 CAPSULE | Refills: 3 | Status: SHIPPED | OUTPATIENT
Start: 2023-04-07

## 2023-05-01 ENCOUNTER — OFFICE VISIT (OUTPATIENT)
Dept: FAMILY MEDICINE CLINIC | Facility: CLINIC | Age: 58
End: 2023-05-01
Payer: COMMERCIAL

## 2023-05-01 VITALS
WEIGHT: 156 LBS | HEIGHT: 67 IN | BODY MASS INDEX: 24.48 KG/M2 | DIASTOLIC BLOOD PRESSURE: 83 MMHG | OXYGEN SATURATION: 99 % | TEMPERATURE: 96.8 F | SYSTOLIC BLOOD PRESSURE: 154 MMHG | HEART RATE: 69 BPM

## 2023-05-01 DIAGNOSIS — G40.209 PARTIAL SYMPTOMATIC EPILEPSY WITH COMPLEX PARTIAL SEIZURES, NOT INTRACTABLE, WITHOUT STATUS EPILEPTICUS: Primary | ICD-10-CM

## 2023-05-01 DIAGNOSIS — R06.09 DOE (DYSPNEA ON EXERTION): ICD-10-CM

## 2023-05-01 DIAGNOSIS — Z13.220 SCREENING, LIPID: ICD-10-CM

## 2023-05-01 DIAGNOSIS — E55.9 VITAMIN D DEFICIENCY: ICD-10-CM

## 2023-05-01 DIAGNOSIS — E03.9 ACQUIRED HYPOTHYROIDISM: ICD-10-CM

## 2023-05-01 DIAGNOSIS — I10 ESSENTIAL HYPERTENSION: ICD-10-CM

## 2023-05-01 PROCEDURE — 99213 OFFICE O/P EST LOW 20 MIN: CPT | Performed by: NURSE PRACTITIONER

## 2023-05-01 RX ORDER — DILTIAZEM HYDROCHLORIDE 240 MG/1
1 CAPSULE, COATED, EXTENDED RELEASE ORAL DAILY
COMMUNITY
Start: 2023-04-20

## 2023-05-01 NOTE — PROGRESS NOTES
"Chief Complaint  Seizures    Subjective          Tasneem Valentino presents to [facility]      History of Present Illness    Tasneem is a 58 year old female patient who presents today for one month follow up regarding seizures.     Pt reports having had a few more seizures since last visit. Dr. Powers increased her Topimax.  She takes 150 in the morning and 200 at night.  She does report this causes some sedation. She has felt a sensation like she was going to have a seizure on occasion but has not had one since April 15, 2023.  She is still on driving restrictions.  Must wait 90 days to return to driving after seizure. She goes back for follow up with Dr. Powers July 24.     She is still SOB with exertion. Once she rests, she recovers quickly.  She is having no chest pain.  Patient still unable to work due to seizure activity and driving restrictions.    Blood pressure slightly elevated today.  154/83.  Asymptomatic.    Continues to follow with pain management Dr. Epps.       Objective     Vital Signs:   /83 (BP Location: Right arm, Patient Position: Sitting, Cuff Size: Adult)   Pulse 69   Temp 96.8 °F (36 °C) (Infrared)   Ht 170.2 cm (67.01\")   Wt 70.8 kg (156 lb)   SpO2 99%   BMI 24.43 kg/m²           Physical Exam  Vitals reviewed.   Constitutional:       Appearance: She is well-developed.      Comments: Wearing a face mask     HENT:      Head: Normocephalic and atraumatic.      Nose: Nose normal.      Mouth/Throat:      Mouth: Mucous membranes are moist.      Pharynx: Oropharynx is clear.   Eyes:      Conjunctiva/sclera: Conjunctivae normal.      Pupils: Pupils are equal, round, and reactive to light.   Cardiovascular:      Rate and Rhythm: Normal rate and regular rhythm.      Pulses: Normal pulses.      Heart sounds: Normal heart sounds. No murmur heard.  Pulmonary:      Effort: Pulmonary effort is normal. No respiratory distress.      Breath sounds: Normal breath sounds. "   Musculoskeletal:         General: Normal range of motion.      Cervical back: Normal range of motion.   Skin:     General: Skin is warm and dry.      Findings: No rash.   Neurological:      Mental Status: She is alert and oriented to person, place, and time.   Psychiatric:         Behavior: Behavior normal.                Result Review :                                   Assessment and Plan      Diagnoses and all orders for this visit:    1. Partial symptomatic epilepsy with complex partial seizures, not intractable, without status epilepticus (Primary)    2. MELTON (dyspnea on exertion)    3. Vitamin D deficiency    4. Essential hypertension    5. Screening, lipid    6. Acquired hypothyroidism      We have not done labs since May of last year.  We will obtain those at next visit.        Follow Up       No follow-ups on file.      Patient was given instructions and counseling regarding her condition or for health maintenance advice. Please see specific information pulled into the AVS if appropriate.     Piper Levine, APRN5/1/202308:28 EDT  This note has been electronically signed

## 2023-06-12 ENCOUNTER — OFFICE VISIT (OUTPATIENT)
Dept: FAMILY MEDICINE CLINIC | Facility: CLINIC | Age: 58
End: 2023-06-12
Payer: COMMERCIAL

## 2023-06-12 VITALS
HEIGHT: 67 IN | DIASTOLIC BLOOD PRESSURE: 74 MMHG | OXYGEN SATURATION: 100 % | SYSTOLIC BLOOD PRESSURE: 138 MMHG | TEMPERATURE: 97.9 F | BODY MASS INDEX: 24.43 KG/M2 | HEART RATE: 80 BPM

## 2023-06-12 DIAGNOSIS — G40.209 PARTIAL SYMPTOMATIC EPILEPSY WITH COMPLEX PARTIAL SEIZURES, NOT INTRACTABLE, WITHOUT STATUS EPILEPTICUS: Primary | ICD-10-CM

## 2023-06-12 PROCEDURE — 99214 OFFICE O/P EST MOD 30 MIN: CPT | Performed by: NURSE PRACTITIONER

## 2023-06-12 RX ORDER — VALACYCLOVIR HYDROCHLORIDE 1 G/1
TABLET, FILM COATED ORAL
COMMUNITY
Start: 2023-05-17

## 2023-06-12 NOTE — PROGRESS NOTES
"Chief Complaint  Seizures    Subjective          Tasneem Valentino presents to [facility]      History of Present Illness    Tasneem is a 58 year old female patient who presents today for 1 month follow-up of seizure activity.    Patient arrived to visit today when getting checked by medical assistant for weight, she felt dizzy and became syncopal.  Has been helped her to the ground.  Staff prompted me to issue to assess.  She was nonresponsive for about 15 seconds but did come to once verbally prompted.  She did not stop respirations, auscultated heart without any adventitious sounds or murmurs.    Patient was eventually sat upright for several seconds and then assisted to wheelchair and roomed to assess vitals.  Blood pressure was slightly elevated today at 146/84.  She was slightly disoriented for about 5 minutes after episode.  Patient reports she slept well last night however on the drive over she did not feel well.  Reports \"feeling cold \".  Blood pressure rechecked at 138/74 by myself.  Patient does feel somewhat better after episode but reports fatigue.     reports that, last week she was at appt with cardiology and had same episode occur. Rapid Response was called and BP was slightly elevated at that episode.  Patient was stabilized and doing well shortly after the episode.    Has been also notes that Dr. Santiago, with neurology increase topiramate to 250 mg BID and considered adding a second medication.  We will request records.      Objective     Vital Signs:   /74 (BP Location: Right arm)   Pulse 80   Temp 97.9 °F (36.6 °C) (Infrared)   Ht 170.2 cm (67.01\")   SpO2 100%   BMI 24.43 kg/m²           Physical Exam  Vitals reviewed.   Constitutional:       Appearance: She is well-developed.      Comments: Wearing a face mask     HENT:      Head: Normocephalic and atraumatic.      Nose: Nose normal.      Mouth/Throat:      Mouth: Mucous membranes are moist.      " "Pharynx: Oropharynx is clear.   Eyes:      General: Lids are normal. No visual field deficit.     Extraocular Movements: Extraocular movements intact.      Conjunctiva/sclera: Conjunctivae normal.      Right eye: Right conjunctiva is not injected. No chemosis, exudate or hemorrhage.     Left eye: No chemosis, exudate or hemorrhage.     Pupils: Pupils are equal, round, and reactive to light.      Visual Fields: Right eye visual fields normal and left eye visual fields normal.   Cardiovascular:      Rate and Rhythm: Normal rate and regular rhythm.      Pulses: Normal pulses.      Heart sounds: Normal heart sounds. No murmur heard.  Pulmonary:      Effort: Pulmonary effort is normal. No respiratory distress.      Breath sounds: Normal breath sounds.   Musculoskeletal:         General: Normal range of motion.      Cervical back: Normal range of motion.   Skin:     General: Skin is warm and dry.      Findings: No rash.   Neurological:      Mental Status: She is alert and oriented to person, place, and time.   Psychiatric:         Mood and Affect: Affect is flat.         Behavior: Behavior is slowed. Behavior is cooperative.      Comments: Behavior slowed.  Patient slow to respond to questions or slightly confused.  Reports she feels \"foggy mentally \".                Result Review :                                   Assessment and Plan      Diagnoses and all orders for this visit:    1. Partial symptomatic epilepsy with complex partial seizures, not intractable, without status epilepticus (Primary)  Assessment & Plan:  Patient is with atypical seizure activity today.  She was aware that she was having this activity, behavior was was with her rest as well as cognitive function momentarily.  She did have some sensory impairment shortly after but returned to baseline.    Patient does have a dermatology appointment later today advised to go ahead and cancel and reschedule that as she needs to go home and rest.  Instructed "  and patient if activity returns need to go to hospital for evaluation and further work-up.  Reiterated to call neurology's office and let them know that she had another episode this week.  Likely patient will need to be on an anticonvulsant or other medication since these are becoming more frequent recently.    Attempted to get patient to go to ER after episode today which she declined.  She states she cannot financially afford any additional bills. Has been approved for short-term disability finally after many months.  She is now in process of long-term disability.  They need today's office note faxed to [employer] insurance claims today - [phone]          Follow Up       No follow-ups on file.      Patient was given instructions and counseling regarding her condition or for health maintenance advice. Please see specific information pulled into the AVS if appropriate.     Piper Levine, APRN6/12/202311:19 EDT  This note has been electronically signed    Answers submitted by the patient for this visit:  Other (Submitted on 6/6/2023)  Please describe your symptoms.: Follow up seizure  Have you had these symptoms before?: Yes  How long have you been having these symptoms?: Greater than 2 weeks  Please list any medications you are currently taking for this condition.: Had last seizure 6/5 dr Santiago  increased toperimate to 250 mg twice daily  Please describe any probable cause for these symptoms. : Unknown  Primary Reason for Visit (Submitted on 6/6/2023)  What is the primary reason for your visit?: Other

## 2023-06-12 NOTE — ASSESSMENT & PLAN NOTE
Patient is with atypical seizure activity today.  She was aware that she was having this activity, behavior was was with her rest as well as cognitive function momentarily.  She did have some sensory impairment shortly after but returned to baseline.    Patient does have a dermatology appointment later today advised to go ahead and cancel and reschedule that as she needs to go home and rest.  Instructed  and patient if activity returns need to go to hospital for evaluation and further work-up.  Reiterated to call neurology's office and let them know that she had another episode this week.  Likely patient will need to be on an anticonvulsant or other medication since these are becoming more frequent recently.

## 2023-07-21 NOTE — PROGRESS NOTES
PA REQUESTED Suzanne Jaramillo  [phone] Subjective   Tasneem Valentino is a 57 y.o. female.     History of Present Illness  Chronic daily headaches, also widespread joint pain with SLE, 10/10 at worst, 1/10 at best, always present, varies, aching, stabbing, worse with weather changes, interferes with ADLs, sleep, failed chiropractor, PT, massage, occipital neurotomy, headaches stim trial. MRI brain wnl. Prior notes reviewed, as above, was seeing Dr. Laguerre with Norco 10mg QID prn and Imitrex 100mg #9/month with some relief, also started Amovig 70mg, uses Zofran 4mg prn. No FH of substance abuse. 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 Had 3 cervical ESIs, symptoms essentially resolved after 1st, improved after 2nd, weakness worsening with a lot of lifting at work. Getting out of breath with any exercise, dx with COPD and asthma but nonsmoker, being worked up, had unremarkable 2D echo and Holter monitor, going to see Neuro, off work on FMLA for now.       The following portions of the patient's history were reviewed and updated as appropriate: allergies, current medications, past family history, past medical history, past social history, past surgical history and problem list.    Review of Systems   Constitutional: Negative for chills, fatigue and fever.   HENT: Positive for hearing loss. Negative for trouble swallowing.    Eyes: Positive for visual disturbance.   Respiratory: Negative for shortness of breath.    Cardiovascular: Negative for chest pain.   Gastrointestinal: Positive for constipation. Negative for abdominal pain, diarrhea, nausea and vomiting.   Genitourinary: Negative for urinary incontinence.   Musculoskeletal: Negative for arthralgias, back pain, joint swelling, myalgias and neck pain.   Neurological: Positive for dizziness and headache. Negative for weakness and numbness.       Objective   Physical Exam   Constitutional: She is oriented to person,  place, and time. She appears well-developed and well-nourished.   HENT:   Head: Normocephalic and atraumatic.   Eyes: Pupils are equal, round, and reactive to light.   Cardiovascular: Normal rate and regular rhythm.   Murmur heard.  Pulmonary/Chest: Breath sounds normal.   Abdominal: Soft. Bowel sounds are normal. She exhibits no distension. There is no abdominal tenderness.   Neurological: She is alert and oriented to person, place, and time. She has normal reflexes. She displays normal reflexes. No sensory deficit.   Psychiatric: Her behavior is normal. Thought content normal.         Assessment & Plan   Diagnoses and all orders for this visit:    1. Cervicalgia (Primary)    2. Cervical radiculopathy    3. Cervical spondylosis without myelopathy    4. Chronic daily headache    5. Neuroforaminal stenosis of cervical spine    6. Pain in joint involving multiple sites        UDS in order 10/6/22. Inspect reviewed, in order.  Treatment plan will consist of continuing current medication as long as it remains effective and is necessary, while evaluating patient at each visit and determining if the medication can be lowered or discontinued, while also using nonopioid therapies to reduce reliance on opioids.  Cont Norco 10mg QID prn, Imitrex 100mg #9.  Began Zofran 4mg TID prn.  Had 3 cervical ESIs per MRI. No contrast per allergy. Some discomfort with left paramedian approach, will perform on right in future.  Failed headache procedures.  RTC in 3 months for f/u.

## 2023-08-07 ENCOUNTER — TRANSCRIBE ORDERS (OUTPATIENT)
Dept: ADMINISTRATIVE | Facility: HOSPITAL | Age: 58
End: 2023-08-07
Payer: COMMERCIAL

## 2023-08-07 DIAGNOSIS — G40.211 PARTIAL SYMPTOMATIC EPILEPSY WITH COMPLEX PARTIAL SEIZURES, INTRACTABLE, WITH STATUS EPILEPTICUS: Primary | ICD-10-CM

## 2023-08-14 ENCOUNTER — OFFICE VISIT (OUTPATIENT)
Dept: FAMILY MEDICINE CLINIC | Facility: CLINIC | Age: 58
End: 2023-08-14
Payer: COMMERCIAL

## 2023-08-14 VITALS
RESPIRATION RATE: 18 BRPM | WEIGHT: 152.4 LBS | BODY MASS INDEX: 23.92 KG/M2 | OXYGEN SATURATION: 100 % | HEIGHT: 67 IN | DIASTOLIC BLOOD PRESSURE: 86 MMHG | SYSTOLIC BLOOD PRESSURE: 141 MMHG | HEART RATE: 71 BPM | TEMPERATURE: 97.7 F

## 2023-08-14 DIAGNOSIS — R06.09 DOE (DYSPNEA ON EXERTION): ICD-10-CM

## 2023-08-14 DIAGNOSIS — G40.209 PARTIAL SYMPTOMATIC EPILEPSY WITH COMPLEX PARTIAL SEIZURES, NOT INTRACTABLE, WITHOUT STATUS EPILEPTICUS: Primary | ICD-10-CM

## 2023-08-14 PROCEDURE — 99213 OFFICE O/P EST LOW 20 MIN: CPT | Performed by: NURSE PRACTITIONER

## 2023-08-14 RX ORDER — LAMOTRIGINE 25 MG/1
75 TABLET ORAL 2 TIMES DAILY
COMMUNITY
Start: 2023-07-24

## 2023-08-14 NOTE — PROGRESS NOTES
Chief Complaint  Seizures    Subjective          Tasneem Valentino presents to [facility]    Neurologic Problem  The patient's primary symptoms include memory loss, syncope and weakness. This is a chronic problem. The current episode started more than 1 month ago. The neurological problem developed suddenly. The problem is unchanged. There was no focality noted. Associated symptoms include an aura, fatigue and headaches. Pertinent negatives include no abdominal pain, auditory change, bladder incontinence, bowel incontinence, vertigo or vomiting.     Tasneem is a 58-year-old female patient who presents today for seizure follow-up.  She is accompanied by  who she gives permission to discuss care with.     She comes in monthly to update me on the progress and status of her seizures.  Has a history of partial symptomatic epilepsy with complex partial seizures that are not intractable.  She has been able unable to work for quite some time due to these episodes. She follows neurology, Dr. Santiago. He has been making medication adjustments.  He tapered her off the zonisamide and has her on topiramate 250 mg twice daily. She also takes lamictal 75 mg BID. She feels better on this regimen.     She saw neurologist 7/24/2023 to follow-up on medication. Last seizure August 5, 2023. Occurred at [facility]. Has scheduled EEG today at [facility]. This will be her second EEG.     She does follow Dr. Weeks with pulmonology.  History of lung nodules and diagnosed with asthma.  She continues with intermittent shortness of breath.  The last inhaler she was on she did not like how it made her feel.  She is going to contact him again to ask what other options there are for her. [facility] to check on pulmonary nodules which patient reports was stable.  We will pull those records today.    Blood pressure today 141/86.  Asymptomatic overall.  Feeling well today.    Objective  "    Vital Signs:   /86 (BP Location: Right arm, Patient Position: Sitting, Cuff Size: Adult)   Pulse 71   Temp 97.7 øF (36.5 øC) (Infrared)   Resp 18   Ht 170.2 cm (67.01\")   Wt 69.1 kg (152 lb 6.4 oz)   SpO2 100%   BMI 23.86 kg/mý           Physical Exam  Vitals reviewed.   Constitutional:       Appearance: She is well-developed.      Comments:      HENT:      Head: Normocephalic and atraumatic.      Nose: Nose normal.      Mouth/Throat:      Mouth: Mucous membranes are moist.      Pharynx: Oropharynx is clear.   Eyes:      Conjunctiva/sclera: Conjunctivae normal.      Pupils: Pupils are equal, round, and reactive to light.   Cardiovascular:      Rate and Rhythm: Normal rate and regular rhythm.      Heart sounds: Normal heart sounds.   Pulmonary:      Effort: Pulmonary effort is normal.      Breath sounds: Normal breath sounds.   Musculoskeletal:         General: Normal range of motion.      Cervical back: Normal range of motion.   Skin:     General: Skin is warm and dry.      Findings: No rash.   Neurological:      General: No focal deficit present.      Mental Status: She is alert and oriented to person, place, and time.      GCS: GCS eye subscore is 4. GCS verbal subscore is 5. GCS motor subscore is 6.      Motor: Motor function is intact.      Coordination: Coordination is intact.      Gait: Gait is intact.   Psychiatric:         Behavior: Behavior normal.              Result Review :                                   Assessment and Plan      Diagnoses and all orders for this visit:    1. Partial symptomatic epilepsy with complex partial seizures, not intractable, without status epilepticus (Primary)    2. MELTON (dyspnea on exertion)      There is overall stable today.  No seizure episodes in clinic.  Advised to continue following with neurology and pulmonology.  She still remains off work due to seizure precautions and inability to drive.  Patient is hopeful for seizures to subside with continued " treatment.  Additionally she is hoping she can get her pulmonary issue under control so that she can return to work and be able to complete CPR.  She is a nurse.  He had nothing more to add to today's visit.  We will fax records at her request to her insurance company.          Follow Up       No follow-ups on file.      Patient was given instructions and counseling regarding her condition or for health maintenance advice. Please see specific information pulled into the AVS if appropriate.     Piper Levine, APRN8/14/202308:22 EDT  This note has been electronically signed    Answers submitted by the patient for this visit:  Primary Reason for Visit (Submitted on 8/13/2023)  What is the primary reason for your visit?: Neurological Problem

## 2023-09-11 ENCOUNTER — OFFICE VISIT (OUTPATIENT)
Dept: FAMILY MEDICINE CLINIC | Facility: CLINIC | Age: 58
End: 2023-09-11
Payer: COMMERCIAL

## 2023-09-11 VITALS
OXYGEN SATURATION: 100 % | HEART RATE: 74 BPM | BODY MASS INDEX: 21.97 KG/M2 | SYSTOLIC BLOOD PRESSURE: 128 MMHG | HEIGHT: 67 IN | TEMPERATURE: 97.3 F | WEIGHT: 140 LBS | DIASTOLIC BLOOD PRESSURE: 82 MMHG

## 2023-09-11 DIAGNOSIS — G40.209 PARTIAL SYMPTOMATIC EPILEPSY WITH COMPLEX PARTIAL SEIZURES, NOT INTRACTABLE, WITHOUT STATUS EPILEPTICUS: Primary | ICD-10-CM

## 2023-09-11 DIAGNOSIS — R10.9 STOMACH ACHE: ICD-10-CM

## 2023-09-11 PROCEDURE — 99213 OFFICE O/P EST LOW 20 MIN: CPT | Performed by: NURSE PRACTITIONER

## 2023-09-11 RX ORDER — LACOSAMIDE 50 MG/1
1 TABLET ORAL EVERY 12 HOURS SCHEDULED
COMMUNITY
Start: 2023-08-30

## 2023-09-11 NOTE — ASSESSMENT & PLAN NOTE
She is taking 20 mg daily advised to increase to 40 mg daily. And follow up in one week. If with continued symptoms will refer to GI.

## 2023-09-11 NOTE — PROGRESS NOTES
"Chief Complaint  Seizures    Subjective          Tasneem Valentino presents to [facility] INTERNAL MEDICINE      Neurologic Problem  This is a chronic problem.   Tasneem is a 58-year-old female patient who presents today to follow-up on seizures.    8/14/2023 Dr. Sol with neurology completed EEG.  Findings are consistent with increased bitemporal seizure tendency. Continues to follow with neurology, 8/29/2023 saw Dr. Santiago. Patient was with stomach pain and headaches and weight loss. Thought she was having a reaction to Lamictal.  Neuro tapering her off the Lamictal and started lacosamide 50 mg twice daily for seizure control.  No changes to topiramate next follow-up appointment is September 25 to see how she is doing with the Vimpat. August 5 was last seizure \"episode\" she was at her daughters house. She has been with no episodes since. She has has a few occasions of general feeling unwell and if she sits or rests, she has no issues. Blood pressure 128/82. She goes back for appt on 9/25/23 to neurology.      She is still with stomach ache/discomfort generalized. She only began this symptom once she started Lamictal. She is tapered off and last dose was Thursday. She is on the lacosamide and tolerating fine. She is on PPI omeprazole for GERD.  She is under some stress related to some family issues.  She reports she does not have much of an appetite and is \"picking \"lightly throughout the day.  She did eat some spaghetti last night which has been reports was \"first full meal she is eaten in a while \".    She is with some intermittent SOB still currently on bronchodilator for diagnosis of asthma.  Follows Dr. Saunders with pulmonology- would like to switch to Dr. Gandhi for closeness of proximity to home.       Objective     Vital Signs:   /82 (BP Location: Right arm, Patient Position: Sitting, Cuff Size: Adult)   Pulse 74   Temp 97.3 °F (36.3 °C) (Infrared)   Ht 170.2 cm (67.01\")   Wt 63.5 kg (140 " lb)   SpO2 100%   BMI 21.92 kg/m²           Physical Exam  Vitals reviewed.   Constitutional:       Appearance: She is well-developed.      Comments:      HENT:      Head: Normocephalic and atraumatic.      Nose: Nose normal.      Mouth/Throat:      Mouth: Mucous membranes are moist.      Pharynx: Oropharynx is clear.   Eyes:      Conjunctiva/sclera: Conjunctivae normal.      Pupils: Pupils are equal, round, and reactive to light.   Cardiovascular:      Rate and Rhythm: Normal rate and regular rhythm.      Heart sounds: Normal heart sounds.   Pulmonary:      Effort: Pulmonary effort is normal.      Breath sounds: Normal breath sounds.   Abdominal:      General: Bowel sounds are normal. There is no distension.      Palpations: Abdomen is soft. There is no mass.      Tenderness: There is no abdominal tenderness. There is no guarding or rebound.      Hernia: No hernia is present.   Musculoskeletal:         General: Normal range of motion.      Cervical back: Normal range of motion.   Skin:     General: Skin is warm and dry.      Findings: No rash.   Neurological:      General: No focal deficit present.      Mental Status: She is alert and oriented to person, place, and time.      GCS: GCS eye subscore is 4. GCS verbal subscore is 5. GCS motor subscore is 6.      Motor: Motor function is intact.      Coordination: Coordination is intact.      Gait: Gait is intact.   Psychiatric:         Behavior: Behavior normal.              Result Review :                                   Assessment and Plan      Diagnoses and all orders for this visit:    1. Partial symptomatic epilepsy with complex partial seizures, not intractable, without status epilepticus (Primary)    2. Stomach ache  Assessment & Plan:  She is taking 20 mg daily advised to increase to 40 mg daily. And follow up in one week. If with continued symptoms will refer to GI.         No seizure activity in 2 months.  Patient doing well with the change to Vimpat.  We  will see what neurology states at appointment in 2 weeks.    Regarding her stomachache it could be stress related she does have an underlying history of GERD.  Advised to increase the omeprazole to 40 mg daily and follow-up with me in a week via MyChart or phone call.  If still with symptoms will refer to gastro.          Follow Up       Return in about 4 weeks (around 10/9/2023) for with FORREST Arboleda.      Patient was given instructions and counseling regarding her condition or for health maintenance advice. Please see specific information pulled into the AVS if appropriate.     Piper Levine, APRN9/11/202313:27 EDT  This note has been electronically signed    Answers submitted by the patient for this visit:  Primary Reason for Visit (Submitted on 9/11/2023)  What is the primary reason for your visit?: Neurological Problem

## 2023-10-16 ENCOUNTER — OFFICE VISIT (OUTPATIENT)
Dept: FAMILY MEDICINE CLINIC | Facility: CLINIC | Age: 58
End: 2023-10-16
Payer: COMMERCIAL

## 2023-10-16 VITALS
DIASTOLIC BLOOD PRESSURE: 76 MMHG | BODY MASS INDEX: 21.03 KG/M2 | OXYGEN SATURATION: 99 % | HEIGHT: 67 IN | HEART RATE: 81 BPM | SYSTOLIC BLOOD PRESSURE: 133 MMHG | WEIGHT: 134 LBS | TEMPERATURE: 97.3 F

## 2023-10-16 DIAGNOSIS — F41.9 ANXIETY: ICD-10-CM

## 2023-10-16 DIAGNOSIS — F43.9 STRESS AT HOME: ICD-10-CM

## 2023-10-16 DIAGNOSIS — G40.209 PARTIAL SYMPTOMATIC EPILEPSY WITH COMPLEX PARTIAL SEIZURES, NOT INTRACTABLE, WITHOUT STATUS EPILEPTICUS: Primary | ICD-10-CM

## 2023-10-16 PROCEDURE — 99214 OFFICE O/P EST MOD 30 MIN: CPT | Performed by: NURSE PRACTITIONER

## 2023-10-16 RX ORDER — FLUTICASONE FUROATE AND VILANTEROL TRIFENATATE 100; 25 UG/1; UG/1
1 POWDER RESPIRATORY (INHALATION) DAILY
COMMUNITY
Start: 2023-09-28

## 2023-10-16 RX ORDER — ALBUTEROL SULFATE 90 UG/1
2 AEROSOL, METERED RESPIRATORY (INHALATION) EVERY 4 HOURS PRN
Qty: 6.7 G | Refills: 6 | Status: SHIPPED | OUTPATIENT
Start: 2023-10-16

## 2023-10-16 RX ORDER — HYDROXYZINE HYDROCHLORIDE 25 MG/1
25 TABLET, FILM COATED ORAL 3 TIMES DAILY PRN
Qty: 90 TABLET | Refills: 0 | Status: CANCELLED | OUTPATIENT
Start: 2023-10-16

## 2023-10-16 NOTE — PROGRESS NOTES
"Chief Complaint  Seizures and Anxiety      Tasneem Valentino presents to [facility] MEDIC[facility]      Subjective      58-year-old female patient presents today to follow-up on seizures and anxiety.    Saw neurology 9/25/2023 he advised to continue topiramate 250 mg twice daily, lacosamide 100 mg in the morning and follow-up in November. She has not had any seizure activity since last visit. She is with continued memory issues. Appt is November 20.    She was pulmonology and was started on Breo inhaler.  She has not noticed a difference in her SOB. Reports she was at [facility] with family and was easily exerted.     is getting a DREW with bubble study at [facility]. She is getting scheduled this week. She was started on Breo. She has not noticed a difference.     She is very stressed personally and would like to go back on sertraline.     Blood pressure 133/76 - asymptomatic.              Objective         Vital Signs:     /76 (BP Location: Right arm, Patient Position: Sitting, Cuff Size: Adult)   Pulse 81   Temp 97.3 °F (36.3 °C) (Infrared)   Ht 170.2 cm (67.01\")   Wt 60.8 kg (134 lb)   SpO2 99%   BMI 20.98 kg/m²       Physical Exam  Vitals reviewed.   Constitutional:       Appearance: She is well-developed.      Comments:      HENT:      Head: Normocephalic and atraumatic.      Nose: Nose normal.      Mouth/Throat:      Mouth: Mucous membranes are moist.      Pharynx: Oropharynx is clear.   Eyes:      Conjunctiva/sclera: Conjunctivae normal.      Pupils: Pupils are equal, round, and reactive to light.   Cardiovascular:      Rate and Rhythm: Normal rate.      Pulses: Normal pulses.      Heart sounds: Normal heart sounds.   Pulmonary:      Effort: Pulmonary effort is normal. No respiratory distress.      Breath sounds: Normal breath sounds. No stridor. No wheezing, rhonchi or rales.   Chest:      Chest wall: No tenderness.   Musculoskeletal:         General: Normal range of motion.    "   Cervical back: Normal range of motion.   Skin:     General: Skin is warm and dry.      Coloration: Skin is pale.      Findings: No rash.   Neurological:      General: No focal deficit present.      Mental Status: She is alert and oriented to person, place, and time.      GCS: GCS eye subscore is 4. GCS verbal subscore is 5. GCS motor subscore is 6.      Sensory: Sensation is intact.      Motor: No seizure activity.      Coordination: Coordination is intact.      Gait: Gait is intact.   Psychiatric:         Behavior: Behavior normal.                History of Present Illness      Patient Active Problem List   Diagnosis    Chronic daily headache    Intractable chronic migraine without aura    Lupus (systemic lupus erythematosus)    Pain in joint involving multiple sites    Anemia    Anxiety    Migraine    Meniere's disease    Hypothyroidism    Depressive disorder    Coronary artery spasm    Aortic insufficiency    Hypertension    History of Lyme disease    Kidney disease, chronic, stage II (GFR 60-89 ml/min)    Nodule of skin of left upper extremity    Weakness of right upper extremity    Vitamin D deficiency    History of renal stone    Nausea    Cervicalgia    Cervical spondylosis without myelopathy    Neuroforaminal stenosis of cervical spine    Cervical radiculopathy    Hoarseness of voice    Screen for colon cancer    Essential hypertension    Acute non-recurrent maxillary sinusitis    SOB (shortness of breath) on exertion    Moderate persistent asthma without complication    Solitary lung nodule    Hoarse voice quality    Medication refill    Sore throat    MELTON (dyspnea on exertion)    Multiple pulmonary nodules    Intermittent lightheadedness    Dizziness    Partial symptomatic epilepsy with complex partial seizures, not intractable, without status epilepticus    Screening, lipid    Stomach ache    Stress at home         Past Medical History:   Diagnosis Date    Acute non-recurrent maxillary sinusitis  05/11/2022    Allergic     Anemia     Asthma     COVID-19 07/28/2021    COVID-19 05/21/2022    Epilepsy     Headache     Heart disease     HL (hearing loss)     Hypertension     Hypothyroidism     Kidney disease     Renal insufficiency     Seizures     Sjogren's disease     Solitary lung nodule 09/27/2022    Thyroid disease     Tremor     Upper respiratory tract infection 11/08/2021          Family History   Problem Relation Age of Onset    Hypertension Mother     Cancer Father     Arthritis Sister     Heart disease Brother     No Known Problems Brother     No Known Problems Brother           Past Surgical History:   Procedure Laterality Date    APPENDECTOMY      BRAIN SURGERY      occipital Neurotomy    CARDIAC CATHETERIZATION      CHOLECYSTECTOMY      OCCIPITAL NEURECTOMY  2010    OVARY SURGERY      SINUS SURGERY            Social History     Socioeconomic History    Marital status:    Tobacco Use    Smoking status: Never     Passive exposure: Never    Smokeless tobacco: Never   Vaping Use    Vaping Use: Never used   Substance and Sexual Activity    Alcohol use: No    Drug use: Never    Sexual activity: Yes     Partners: Male     Birth control/protection: Post-menopausal                      Result Review :                                         Assessment and Plan      Diagnoses and all orders for this visit:    1. Partial symptomatic epilepsy with complex partial seizures, not intractable, without status epilepticus (Primary)    2. Anxiety    3. Stress at home    Other orders  -     sertraline (Zoloft) 50 MG tablet; Take 1 tablet by mouth Daily.  Dispense: 30 tablet; Refill: 1      Since seeing neurology, patient has had medication adjustments to topiramate and lacosamide.  She has not had any seizure activity since the dose adjustment.  She follows up with Dr. Santiago next month November 20.    She is stressed with multiple things going on at home.  She has been on sertraline before in the past and that  "worked really well for her but she went off at 1 point in time due to \"not wanting to be on medications \".  She states she would really like to get back on this again as it did help previously.    She has seen pulmonology Dr. Gandhi restarted her Breo inhaler.  No significant improvement in symptoms but this was just recently.    Continues to follow her cardiologist and [facility].  She is going to have a DREW bubble study done next week.  Advised to have them fax that report over to us.        Follow Up       Return in about 5 weeks (around 11/20/2023) for with FORREST Arboleda.      Patient was given instructions and counseling regarding her condition or for health maintenance advice. Please see specific information pulled into the AVS if appropriate.     Piper Levine, APRN10/16/202308:53 EDT  This note has been electronically signed      "

## 2023-10-19 ENCOUNTER — OFFICE VISIT (OUTPATIENT)
Dept: PAIN MEDICINE | Facility: CLINIC | Age: 58
End: 2023-10-19
Payer: COMMERCIAL

## 2023-10-19 VITALS
DIASTOLIC BLOOD PRESSURE: 59 MMHG | SYSTOLIC BLOOD PRESSURE: 127 MMHG | OXYGEN SATURATION: 100 % | HEART RATE: 73 BPM | RESPIRATION RATE: 16 BRPM

## 2023-10-19 DIAGNOSIS — M54.2 CERVICALGIA: ICD-10-CM

## 2023-10-19 DIAGNOSIS — M54.12 CERVICAL RADICULOPATHY: ICD-10-CM

## 2023-10-19 DIAGNOSIS — M25.50 PAIN IN JOINT INVOLVING MULTIPLE SITES: ICD-10-CM

## 2023-10-19 DIAGNOSIS — Z79.899 HIGH RISK MEDICATION USE: Primary | ICD-10-CM

## 2023-10-19 DIAGNOSIS — M47.812 CERVICAL SPONDYLOSIS WITHOUT MYELOPATHY: ICD-10-CM

## 2023-10-19 RX ORDER — HYDROCODONE BITARTRATE AND ACETAMINOPHEN 10; 325 MG/1; MG/1
1 TABLET ORAL EVERY 6 HOURS PRN
Qty: 120 TABLET | Refills: 0 | Status: SHIPPED | OUTPATIENT
Start: 2023-10-19 | End: 2023-10-23 | Stop reason: SDUPTHER

## 2023-10-19 RX ORDER — HYDROCODONE BITARTRATE AND ACETAMINOPHEN 10; 325 MG/1; MG/1
1 TABLET ORAL EVERY 6 HOURS PRN
Qty: 120 TABLET | Refills: 0 | Status: SHIPPED | OUTPATIENT
Start: 2023-10-19

## 2023-10-19 RX ORDER — SUMATRIPTAN 100 MG/1
100 TABLET, FILM COATED ORAL ONCE AS NEEDED
Qty: 9 TABLET | Refills: 5 | Status: SHIPPED | OUTPATIENT
Start: 2023-10-19

## 2023-10-19 NOTE — PROGRESS NOTES
Subjective   Tasneem Valentino is a 58 y.o. female.     History of Present Illness  Chronic daily headaches, also widespread joint pain with SLE, 10/10 at worst, 1/10 at best, always present, varies, aching, stabbing, worse with weather changes, interferes with ADLs, sleep, failed chiropractor, PT, massage, occipital neurotomy, headaches stim trial. MRI brain wnl. Prior notes reviewed, as above, was seeing Dr. Laguerre with Norco 10mg QID prn and Imitrex 100mg #9/month with some relief, also started Amovig 70mg, uses Zofran 4mg prn. No FH of substance abuse. 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 Had 3 cervical ESIs, symptoms essentially resolved after 1st, improved after 2nd, weakness worsening with a lot of lifting at work. Getting out of breath with any exercise, dx with COPD and asthma but nonsmoker, being worked up, had unremarkable 2D echo and Holter monitor, going to see Neuro, off work on FMLA for now, Neuro dx her with seizures with abnormal EEG, increased Topamax used for migraine prophylaxis now also controlling seizures, began Zonisamide also.   Neck Pain   Pertinent negatives include no chest pain, fever, numbness, trouble swallowing or weakness.        The following portions of the patient's history were reviewed and updated as appropriate: allergies, current medications, past family history, past medical history, past social history, past surgical history and problem list.    Review of Systems   Constitutional:  Negative for chills, fatigue and fever.   HENT:  Positive for hearing loss. Negative for trouble swallowing.    Eyes:  Positive for visual disturbance.   Respiratory:  Negative for shortness of breath.    Cardiovascular:  Negative for chest pain.   Gastrointestinal:  Positive for constipation. Negative for abdominal pain, diarrhea, nausea and vomiting.   Genitourinary:  Negative for urinary incontinence.    Musculoskeletal:  Positive for neck pain. Negative for arthralgias, back pain, joint swelling and myalgias.   Neurological:  Positive for dizziness and headache. Negative for weakness and numbness.       Objective   Physical Exam   Constitutional: She is oriented to person, place, and time. She appears well-developed and well-nourished.   HENT:   Head: Normocephalic and atraumatic.   Eyes: Pupils are equal, round, and reactive to light.   Cardiovascular: Normal rate and regular rhythm.   Murmur heard.  Pulmonary/Chest: Breath sounds normal.   Abdominal: Soft. Bowel sounds are normal. She exhibits no distension. There is no abdominal tenderness.   Neurological: She is alert and oriented to person, place, and time. She has normal reflexes. She displays normal reflexes. No sensory deficit.   Psychiatric: Her behavior is normal. Thought content normal.         Assessment & Plan   Diagnoses and all orders for this visit:    1. Cervicalgia (Primary)    2. Cervical spondylosis without myelopathy    3. Cervical radiculopathy    4. Pain in joint involving multiple sites        UDS in order 10/6/22. Inspect reviewed, in order.  Treatment plan will consist of continuing current medication as long as it remains effective and is necessary, while evaluating patient at each visit and determining if the medication can be lowered or discontinued, while also using nonopioid therapies to reduce reliance on opioids.  Cont Norco 10mg QID prn, Imitrex 100mg #9. Filled Norco 10/3/23 per new Inspect.  Began Zofran 4mg TID prn.  Had 3 cervical ESIs per MRI. No contrast per allergy. Some discomfort with left paramedian approach, will perform on right in future.  Failed headache procedures.  RTC in 3 months for f/u.

## 2023-10-21 ENCOUNTER — HOSPITAL ENCOUNTER (EMERGENCY)
Facility: HOSPITAL | Age: 58
Discharge: HOME | End: 2023-10-21
Attending: EMERGENCY MEDICINE
Payer: COMMERCIAL

## 2023-10-21 VITALS
TEMPERATURE: 98 F | RESPIRATION RATE: 18 BRPM | BODY MASS INDEX: 21.22 KG/M2 | HEIGHT: 66 IN | SYSTOLIC BLOOD PRESSURE: 130 MMHG | DIASTOLIC BLOOD PRESSURE: 66 MMHG | WEIGHT: 132.06 LBS | OXYGEN SATURATION: 98 % | HEART RATE: 78 BPM

## 2023-10-21 DIAGNOSIS — R11.0 NAUSEA: ICD-10-CM

## 2023-10-21 DIAGNOSIS — R10.13 EPIGASTRIC PAIN: Primary | ICD-10-CM

## 2023-10-21 LAB
ALBUMIN SERPL-MCNC: 4.5 G/DL (ref 3.5–5.2)
ALBUMIN/GLOB SERPL: 1.7 G/DL
ALP SERPL-CCNC: 118 U/L (ref 39–117)
ALT SERPL W P-5'-P-CCNC: 8 U/L (ref 1–33)
ANION GAP SERPL CALCULATED.3IONS-SCNC: 14 MMOL/L (ref 5–15)
AST SERPL-CCNC: 14 U/L (ref 1–32)
BACTERIA UR QL AUTO: ABNORMAL /HPF
BASOPHILS # BLD AUTO: 0 10*3/MM3 (ref 0–0.2)
BASOPHILS NFR BLD AUTO: 0.7 % (ref 0–1.5)
BILIRUB SERPL-MCNC: 0.2 MG/DL (ref 0–1.2)
BILIRUB UR QL STRIP: NEGATIVE
BUN SERPL-MCNC: 14 MG/DL (ref 6–20)
BUN/CREAT SERPL: 13.3 (ref 7–25)
CALCIUM SPEC-SCNC: 9.8 MG/DL (ref 8.6–10.5)
CHLORIDE SERPL-SCNC: 108 MMOL/L (ref 98–107)
CLARITY UR: CLEAR
CO2 SERPL-SCNC: 19 MMOL/L (ref 22–29)
COLOR UR: YELLOW
CREAT SERPL-MCNC: 1.05 MG/DL (ref 0.57–1)
DEPRECATED RDW RBC AUTO: 51.2 FL (ref 37–54)
EGFRCR SERPLBLD CKD-EPI 2021: 62.1 ML/MIN/1.73
EOSINOPHIL # BLD AUTO: 0.1 10*3/MM3 (ref 0–0.4)
EOSINOPHIL NFR BLD AUTO: 1.6 % (ref 0.3–6.2)
ERYTHROCYTE [DISTWIDTH] IN BLOOD BY AUTOMATED COUNT: 17.2 % (ref 12.3–15.4)
GLOBULIN UR ELPH-MCNC: 2.6 GM/DL
GLUCOSE SERPL-MCNC: 104 MG/DL (ref 65–99)
GLUCOSE UR STRIP-MCNC: NEGATIVE MG/DL
HCT VFR BLD AUTO: 37.8 % (ref 34–46.6)
HGB BLD-MCNC: 12.1 G/DL (ref 12–15.9)
HGB UR QL STRIP.AUTO: NEGATIVE
HYALINE CASTS UR QL AUTO: ABNORMAL /LPF
KETONES UR QL STRIP: NEGATIVE
LEUKOCYTE ESTERASE UR QL STRIP.AUTO: ABNORMAL
LIPASE SERPL-CCNC: 18 U/L (ref 13–60)
LYMPHOCYTES # BLD AUTO: 0.7 10*3/MM3 (ref 0.7–3.1)
LYMPHOCYTES NFR BLD AUTO: 13.3 % (ref 19.6–45.3)
MCH RBC QN AUTO: 25.8 PG (ref 26.6–33)
MCHC RBC AUTO-ENTMCNC: 32 G/DL (ref 31.5–35.7)
MCV RBC AUTO: 80.7 FL (ref 79–97)
MONOCYTES # BLD AUTO: 0.5 10*3/MM3 (ref 0.1–0.9)
MONOCYTES NFR BLD AUTO: 10 % (ref 5–12)
NEUTROPHILS NFR BLD AUTO: 4 10*3/MM3 (ref 1.7–7)
NEUTROPHILS NFR BLD AUTO: 74.4 % (ref 42.7–76)
NITRITE UR QL STRIP: NEGATIVE
NRBC BLD AUTO-RTO: 0 /100 WBC (ref 0–0.2)
PH UR STRIP.AUTO: 6 [PH] (ref 5–8)
PLATELET # BLD AUTO: 177 10*3/MM3 (ref 140–450)
PMV BLD AUTO: 9.2 FL (ref 6–12)
POTASSIUM SERPL-SCNC: 3.7 MMOL/L (ref 3.5–5.2)
PROT SERPL-MCNC: 7.1 G/DL (ref 6–8.5)
PROT UR QL STRIP: ABNORMAL
RBC # BLD AUTO: 4.69 10*6/MM3 (ref 3.77–5.28)
RBC # UR STRIP: ABNORMAL /HPF
REF LAB TEST METHOD: ABNORMAL
SODIUM SERPL-SCNC: 141 MMOL/L (ref 136–145)
SP GR UR STRIP: 1.01 (ref 1–1.03)
SQUAMOUS #/AREA URNS HPF: ABNORMAL /HPF
TRANS CELLS #/AREA URNS HPF: ABNORMAL /HPF
UROBILINOGEN UR QL STRIP: ABNORMAL
WBC # UR STRIP: ABNORMAL /HPF
WBC NRBC COR # BLD: 5.4 10*3/MM3 (ref 3.4–10.8)
YEAST URNS QL MICRO: ABNORMAL /HPF

## 2023-10-21 PROCEDURE — 25010000002 ONDANSETRON PER 1 MG: Performed by: EMERGENCY MEDICINE

## 2023-10-21 PROCEDURE — 85025 COMPLETE CBC W/AUTO DIFF WBC: CPT | Performed by: EMERGENCY MEDICINE

## 2023-10-21 PROCEDURE — 83690 ASSAY OF LIPASE: CPT | Performed by: EMERGENCY MEDICINE

## 2023-10-21 PROCEDURE — 96375 TX/PRO/DX INJ NEW DRUG ADDON: CPT

## 2023-10-21 PROCEDURE — 80053 COMPREHEN METABOLIC PANEL: CPT | Performed by: EMERGENCY MEDICINE

## 2023-10-21 PROCEDURE — 25010000002 MORPHINE PER 10 MG: Performed by: EMERGENCY MEDICINE

## 2023-10-21 PROCEDURE — 81001 URINALYSIS AUTO W/SCOPE: CPT | Performed by: EMERGENCY MEDICINE

## 2023-10-21 PROCEDURE — 96374 THER/PROPH/DIAG INJ IV PUSH: CPT

## 2023-10-21 PROCEDURE — 99283 EMERGENCY DEPT VISIT LOW MDM: CPT

## 2023-10-21 PROCEDURE — 36415 COLL VENOUS BLD VENIPUNCTURE: CPT

## 2023-10-21 RX ORDER — ONDANSETRON 4 MG/1
4 TABLET, ORALLY DISINTEGRATING ORAL EVERY 8 HOURS PRN
Qty: 12 TABLET | Refills: 0 | Status: SHIPPED | OUTPATIENT
Start: 2023-10-21 | End: 2023-10-25

## 2023-10-21 RX ORDER — SODIUM CHLORIDE 0.9 % (FLUSH) 0.9 %
10 SYRINGE (ML) INJECTION AS NEEDED
Status: DISCONTINUED | OUTPATIENT
Start: 2023-10-21 | End: 2023-10-21 | Stop reason: HOSPADM

## 2023-10-21 RX ORDER — MORPHINE SULFATE 2 MG/ML
2 INJECTION, SOLUTION INTRAMUSCULAR; INTRAVENOUS ONCE
Status: COMPLETED | OUTPATIENT
Start: 2023-10-21 | End: 2023-10-21

## 2023-10-21 RX ORDER — SUCRALFATE 1 G/1
1 TABLET ORAL 4 TIMES DAILY
Qty: 40 TABLET | Refills: 0 | Status: SHIPPED | OUTPATIENT
Start: 2023-10-21 | End: 2023-10-25 | Stop reason: SDUPTHER

## 2023-10-21 RX ORDER — ONDANSETRON 2 MG/ML
4 INJECTION INTRAMUSCULAR; INTRAVENOUS ONCE
Status: COMPLETED | OUTPATIENT
Start: 2023-10-21 | End: 2023-10-21

## 2023-10-21 RX ORDER — PANTOPRAZOLE SODIUM 40 MG/1
40 TABLET, DELAYED RELEASE ORAL DAILY
Qty: 14 TABLET | Refills: 0 | Status: SHIPPED | OUTPATIENT
Start: 2023-10-21 | End: 2023-10-25

## 2023-10-21 RX ADMIN — MORPHINE SULFATE 2 MG: 2 INJECTION, SOLUTION INTRAMUSCULAR; INTRAVENOUS at 18:54

## 2023-10-21 RX ADMIN — ONDANSETRON 4 MG: 2 INJECTION INTRAMUSCULAR; INTRAVENOUS at 18:53

## 2023-10-21 NOTE — ED PROVIDER NOTES
Subjective   History of Present Illness  Patient a 58 old female complaining of abdominal pain that she had intermittent for the past several months but became worse over the past few days.  She relates that the recent medication changes that she had been going through with her physician.  She denies fever.  She has had nausea without diarrhea.  She denies other complaints.      Review of Systems    Past Medical History:   Diagnosis Date    Acute non-recurrent maxillary sinusitis 05/11/2022    Allergic     Anemia     Asthma     COVID-19 07/28/2021    COVID-19 05/21/2022    Epilepsy     Headache     Heart disease     HL (hearing loss)     Hypertension     Hypothyroidism     Kidney disease     Renal insufficiency     Seizures     Sjogren's disease     Solitary lung nodule 09/27/2022    Thyroid disease     Tremor     Upper respiratory tract infection 11/08/2021       Allergies   Allergen Reactions    Contrast Dye (Echo Or Unknown Ct/Mr) Itching and Shortness Of Breath    Sulfa Antibiotics Swelling    Tizanidine Mental Status Change and Hallucinations    Iodinated Contrast Media Itching    Breztri Aerosphere [Budeson-Glycopyrrol-Formoterol] Photosensitivity     Double vision    Oxcarbazepine Confusion       Past Surgical History:   Procedure Laterality Date    APPENDECTOMY      BRAIN SURGERY      occipital Neurotomy    CARDIAC CATHETERIZATION      CHOLECYSTECTOMY      OCCIPITAL NEURECTOMY  2010    OVARY SURGERY      SINUS SURGERY         Family History   Problem Relation Age of Onset    Hypertension Mother     Cancer Father     Arthritis Sister     Heart disease Brother     No Known Problems Brother     No Known Problems Brother        Social History     Socioeconomic History    Marital status:    Tobacco Use    Smoking status: Never     Passive exposure: Never    Smokeless tobacco: Never   Vaping Use    Vaping Use: Never used   Substance and Sexual Activity    Alcohol use: No    Drug use: Never    Sexual  activity: Yes     Partners: Male     Birth control/protection: Post-menopausal           Objective   Physical Exam  Neck has no adenopathy JVD or bruits.  Lungs clear.  Heart has regular rhythm.  Chest is nontender.  Abdomen is soft with mild epigastric tenderness.  Patient has normal bowel sounds without rebound or guarding.  Back has no tenderness.  Extremities M unremarkable.  Procedures           ED Course      Results for orders placed or performed during the hospital encounter of 10/21/23   Comprehensive Metabolic Panel    Specimen: Blood   Result Value Ref Range    Glucose 104 (H) 65 - 99 mg/dL    BUN 14 6 - 20 mg/dL    Creatinine 1.05 (H) 0.57 - 1.00 mg/dL    Sodium 141 136 - 145 mmol/L    Potassium 3.7 3.5 - 5.2 mmol/L    Chloride 108 (H) 98 - 107 mmol/L    CO2 19.0 (L) 22.0 - 29.0 mmol/L    Calcium 9.8 8.6 - 10.5 mg/dL    Total Protein 7.1 6.0 - 8.5 g/dL    Albumin 4.5 3.5 - 5.2 g/dL    ALT (SGPT) 8 1 - 33 U/L    AST (SGOT) 14 1 - 32 U/L    Alkaline Phosphatase 118 (H) 39 - 117 U/L    Total Bilirubin 0.2 0.0 - 1.2 mg/dL    Globulin 2.6 gm/dL    A/G Ratio 1.7 g/dL    BUN/Creatinine Ratio 13.3 7.0 - 25.0    Anion Gap 14.0 5.0 - 15.0 mmol/L    eGFR 62.1 >60.0 mL/min/1.73   Lipase    Specimen: Blood   Result Value Ref Range    Lipase 18 13 - 60 U/L   Urinalysis With Microscopic If Indicated (No Culture) - Urine, Clean Catch    Specimen: Urine, Clean Catch   Result Value Ref Range    Color, UA Yellow Yellow, Straw    Appearance, UA Clear Clear    pH, UA 6.0 5.0 - 8.0    Specific Gravity, UA 1.011 1.005 - 1.030    Glucose, UA Negative Negative    Ketones, UA Negative Negative    Bilirubin, UA Negative Negative    Blood, UA Negative Negative    Protein, UA Trace (A) Negative    Leuk Esterase, UA Moderate (2+) (A) Negative    Nitrite, UA Negative Negative    Urobilinogen, UA 1.0 E.U./dL 0.2 - 1.0 E.U./dL   CBC Auto Differential    Specimen: Blood   Result Value Ref Range    WBC 5.40 3.40 - 10.80 10*3/mm3    RBC  4.69 3.77 - 5.28 10*6/mm3    Hemoglobin 12.1 12.0 - 15.9 g/dL    Hematocrit 37.8 34.0 - 46.6 %    MCV 80.7 79.0 - 97.0 fL    MCH 25.8 (L) 26.6 - 33.0 pg    MCHC 32.0 31.5 - 35.7 g/dL    RDW 17.2 (H) 12.3 - 15.4 %    RDW-SD 51.2 37.0 - 54.0 fl    MPV 9.2 6.0 - 12.0 fL    Platelets 177 140 - 450 10*3/mm3    Neutrophil % 74.4 42.7 - 76.0 %    Lymphocyte % 13.3 (L) 19.6 - 45.3 %    Monocyte % 10.0 5.0 - 12.0 %    Eosinophil % 1.6 0.3 - 6.2 %    Basophil % 0.7 0.0 - 1.5 %    Neutrophils, Absolute 4.00 1.70 - 7.00 10*3/mm3    Lymphocytes, Absolute 0.70 0.70 - 3.10 10*3/mm3    Monocytes, Absolute 0.50 0.10 - 0.90 10*3/mm3    Eosinophils, Absolute 0.10 0.00 - 0.40 10*3/mm3    Basophils, Absolute 0.00 0.00 - 0.20 10*3/mm3    nRBC 0.0 0.0 - 0.2 /100 WBC   Urinalysis, Microscopic Only - Urine, Clean Catch    Specimen: Urine, Clean Catch   Result Value Ref Range    RBC, UA None Seen None Seen, 0-2 /HPF    WBC, UA 3-5 (A) None Seen, 0-2 /HPF    Bacteria, UA Trace (A) None Seen /HPF    Squamous Epithelial Cells, UA 0-2 None Seen, 0-2 /HPF    Transitional Epithelial Cells, UA 3-6 (A) 0 - 2 /HPF    Yeast, UA Small/1+ Yeast None Seen /HPF    Hyaline Casts, UA 21-30 None Seen /LPF    Methodology Manual Light Microscopy                                           Medical Decision Making  CMP shows no evidence of hepatitis.  There is no renal insufficiency or electrolyte abnormality.  Lipase is normal.  UA has no evidence of urinary tract infection.  Patient has no leukocytosis with no left shift.  No anemia.  Patient was given Toradol and Zofran IV.  She will be discharged with prescription for Protonix Carafate and Zofran.  She will follow with her MD for recheck.    Amount and/or Complexity of Data Reviewed  Labs: ordered. Decision-making details documented in ED Course.    Risk  Prescription drug management.        Final diagnoses:   Epigastric pain   Nausea       ED Disposition  ED Disposition       ED Disposition   Discharge     Condition   Stable    Comment   --               No follow-up provider specified.       Medication List        New Prescriptions      ondansetron ODT 4 MG disintegrating tablet  Commonly known as: ZOFRAN-ODT  Place 1 tablet on the tongue Every 8 (Eight) Hours As Needed for Nausea.     pantoprazole 40 MG EC tablet  Commonly known as: PROTONIX  Take 1 tablet by mouth Daily.     sucralfate 1 g tablet  Commonly known as: CARAFATE  Take 1 tablet by mouth 4 (Four) Times a Day.            Changed      topiramate 100 MG tablet  Commonly known as: TOPAMAX  Take 1 tablet by mouth 2 (Two) Times a Day.  What changed: additional instructions               Where to Get Your Medications        Information about where to get these medications is not yet available    Ask your nurse or doctor about these medications  ondansetron ODT 4 MG disintegrating tablet  pantoprazole 40 MG EC tablet  sucralfate 1 g tablet            Rohith Dove MD  10/21/23 [id number]

## 2023-10-21 NOTE — ED NOTES
Pt reports severe epigastric pain x3 months. Began after her seizure meds were changed. MD added Lamictal but discontinued it after she began having symptoms. Nausea and diarrhea, no vomiting

## 2023-10-23 ENCOUNTER — HOSPITAL ENCOUNTER (OUTPATIENT)
Facility: HOSPITAL | Age: 58
Setting detail: OBSERVATION
Discharge: HOME | End: 2023-10-25
Attending: EMERGENCY MEDICINE | Admitting: STUDENT IN AN ORGANIZED HEALTH CARE EDUCATION/TRAINING PROGRAM
Payer: COMMERCIAL

## 2023-10-23 ENCOUNTER — APPOINTMENT (OUTPATIENT)
Dept: CT IMAGING | Facility: HOSPITAL | Age: 58
End: 2023-10-23
Payer: COMMERCIAL

## 2023-10-23 ENCOUNTER — OFFICE VISIT (OUTPATIENT)
Dept: FAMILY MEDICINE CLINIC | Facility: CLINIC | Age: 58
End: 2023-10-23
Payer: COMMERCIAL

## 2023-10-23 VITALS
TEMPERATURE: 97.7 F | DIASTOLIC BLOOD PRESSURE: 71 MMHG | SYSTOLIC BLOOD PRESSURE: 104 MMHG | OXYGEN SATURATION: 100 % | WEIGHT: 129 LBS | HEIGHT: 66 IN | BODY MASS INDEX: 20.73 KG/M2 | HEART RATE: 88 BPM

## 2023-10-23 DIAGNOSIS — R41.0 CONFUSION: ICD-10-CM

## 2023-10-23 DIAGNOSIS — R10.84 GENERALIZED ABDOMINAL PAIN: Primary | ICD-10-CM

## 2023-10-23 DIAGNOSIS — G40.209 PARTIAL SYMPTOMATIC EPILEPSY WITH COMPLEX PARTIAL SEIZURES, NOT INTRACTABLE, WITHOUT STATUS EPILEPTICUS: ICD-10-CM

## 2023-10-23 DIAGNOSIS — R10.9 ABDOMINAL PAIN, UNSPECIFIED ABDOMINAL LOCATION: Primary | ICD-10-CM

## 2023-10-23 DIAGNOSIS — Z12.11 SCREEN FOR COLON CANCER: ICD-10-CM

## 2023-10-23 LAB
ALBUMIN SERPL-MCNC: 4.4 G/DL (ref 3.5–5.2)
ALBUMIN/GLOB SERPL: 1.8 G/DL
ALP SERPL-CCNC: 112 U/L (ref 39–117)
ALT SERPL W P-5'-P-CCNC: 7 U/L (ref 1–33)
ANION GAP SERPL CALCULATED.3IONS-SCNC: 14 MMOL/L (ref 5–15)
APTT PPP: 25.7 SECONDS (ref 61–76.5)
AST SERPL-CCNC: 13 U/L (ref 1–32)
BACTERIA UR QL AUTO: ABNORMAL /HPF
BASOPHILS # BLD AUTO: 0 10*3/MM3 (ref 0–0.2)
BASOPHILS NFR BLD AUTO: 0.5 % (ref 0–1.5)
BILIRUB SERPL-MCNC: 0.2 MG/DL (ref 0–1.2)
BILIRUB UR QL STRIP: NEGATIVE
BUN SERPL-MCNC: 15 MG/DL (ref 6–20)
BUN/CREAT SERPL: 16.7 (ref 7–25)
CALCIUM SPEC-SCNC: 9.8 MG/DL (ref 8.6–10.5)
CHLORIDE SERPL-SCNC: 107 MMOL/L (ref 98–107)
CLARITY UR: CLEAR
CO2 SERPL-SCNC: 20 MMOL/L (ref 22–29)
COLOR UR: YELLOW
CREAT SERPL-MCNC: 0.9 MG/DL (ref 0.57–1)
DEPRECATED RDW RBC AUTO: 50.8 FL (ref 37–54)
EGFRCR SERPLBLD CKD-EPI 2021: 74.7 ML/MIN/1.73
EOSINOPHIL # BLD AUTO: 0 10*3/MM3 (ref 0–0.4)
EOSINOPHIL NFR BLD AUTO: 0.8 % (ref 0.3–6.2)
ERYTHROCYTE [DISTWIDTH] IN BLOOD BY AUTOMATED COUNT: 17 % (ref 12.3–15.4)
GEN 5 2HR TROPONIN T REFLEX: 28 NG/L
GLOBULIN UR ELPH-MCNC: 2.5 GM/DL
GLUCOSE SERPL-MCNC: 91 MG/DL (ref 65–99)
GLUCOSE UR STRIP-MCNC: NEGATIVE MG/DL
HCT VFR BLD AUTO: 36.6 % (ref 34–46.6)
HGB BLD-MCNC: 11.8 G/DL (ref 12–15.9)
HGB UR QL STRIP.AUTO: NEGATIVE
HOLD SPECIMEN: NORMAL
HYALINE CASTS UR QL AUTO: ABNORMAL /LPF
INR PPP: 1.15 (ref 0.93–1.1)
KETONES UR QL STRIP: ABNORMAL
LEUKOCYTE ESTERASE UR QL STRIP.AUTO: ABNORMAL
LIPASE SERPL-CCNC: 18 U/L (ref 13–60)
LYMPHOCYTES # BLD AUTO: 0.5 10*3/MM3 (ref 0.7–3.1)
LYMPHOCYTES NFR BLD AUTO: 10.7 % (ref 19.6–45.3)
MCH RBC QN AUTO: 25.8 PG (ref 26.6–33)
MCHC RBC AUTO-ENTMCNC: 32.1 G/DL (ref 31.5–35.7)
MCV RBC AUTO: 80.2 FL (ref 79–97)
MONOCYTES # BLD AUTO: 0.3 10*3/MM3 (ref 0.1–0.9)
MONOCYTES NFR BLD AUTO: 6.6 % (ref 5–12)
NEUTROPHILS NFR BLD AUTO: 3.5 10*3/MM3 (ref 1.7–7)
NEUTROPHILS NFR BLD AUTO: 81.4 % (ref 42.7–76)
NITRITE UR QL STRIP: NEGATIVE
NRBC BLD AUTO-RTO: 0.1 /100 WBC (ref 0–0.2)
PH UR STRIP.AUTO: 5.5 [PH] (ref 5–8)
PLATELET # BLD AUTO: 163 10*3/MM3 (ref 140–450)
PMV BLD AUTO: 9.4 FL (ref 6–12)
POTASSIUM SERPL-SCNC: 3.6 MMOL/L (ref 3.5–5.2)
PROT SERPL-MCNC: 6.9 G/DL (ref 6–8.5)
PROT UR QL STRIP: ABNORMAL
PROTHROMBIN TIME: 12.4 SECONDS (ref 9.6–11.7)
RBC # BLD AUTO: 4.57 10*6/MM3 (ref 3.77–5.28)
RBC # UR STRIP: ABNORMAL /HPF
REF LAB TEST METHOD: ABNORMAL
SODIUM SERPL-SCNC: 141 MMOL/L (ref 136–145)
SP GR UR STRIP: 1.02 (ref 1–1.03)
SQUAMOUS #/AREA URNS HPF: ABNORMAL /HPF
TROPONIN T DELTA: 2 NG/L
TROPONIN T SERPL HS-MCNC: 26 NG/L
UROBILINOGEN UR QL STRIP: ABNORMAL
WBC # UR STRIP: ABNORMAL /HPF
WBC NRBC COR # BLD: 4.2 10*3/MM3 (ref 3.4–10.8)

## 2023-10-23 PROCEDURE — 25010000002 MORPHINE PER 10 MG: Performed by: EMERGENCY MEDICINE

## 2023-10-23 PROCEDURE — 96375 TX/PRO/DX INJ NEW DRUG ADDON: CPT

## 2023-10-23 PROCEDURE — G0378 HOSPITAL OBSERVATION PER HR: HCPCS

## 2023-10-23 PROCEDURE — 94640 AIRWAY INHALATION TREATMENT: CPT

## 2023-10-23 PROCEDURE — 84484 ASSAY OF TROPONIN QUANT: CPT | Performed by: PHYSICIAN ASSISTANT

## 2023-10-23 PROCEDURE — 25010000002 ONDANSETRON PER 1 MG: Performed by: INTERNAL MEDICINE

## 2023-10-23 PROCEDURE — 96376 TX/PRO/DX INJ SAME DRUG ADON: CPT

## 2023-10-23 PROCEDURE — 83690 ASSAY OF LIPASE: CPT | Performed by: EMERGENCY MEDICINE

## 2023-10-23 PROCEDURE — 85025 COMPLETE CBC W/AUTO DIFF WBC: CPT | Performed by: EMERGENCY MEDICINE

## 2023-10-23 PROCEDURE — 99214 OFFICE O/P EST MOD 30 MIN: CPT | Performed by: NURSE PRACTITIONER

## 2023-10-23 PROCEDURE — 36415 COLL VENOUS BLD VENIPUNCTURE: CPT | Performed by: PHYSICIAN ASSISTANT

## 2023-10-23 PROCEDURE — 85610 PROTHROMBIN TIME: CPT | Performed by: EMERGENCY MEDICINE

## 2023-10-23 PROCEDURE — 25010000002 ONDANSETRON PER 1 MG: Performed by: EMERGENCY MEDICINE

## 2023-10-23 PROCEDURE — 80053 COMPREHEN METABOLIC PANEL: CPT | Performed by: EMERGENCY MEDICINE

## 2023-10-23 PROCEDURE — 96374 THER/PROPH/DIAG INJ IV PUSH: CPT

## 2023-10-23 PROCEDURE — 25010000002 MORPHINE PER 10 MG: Performed by: STUDENT IN AN ORGANIZED HEALTH CARE EDUCATION/TRAINING PROGRAM

## 2023-10-23 PROCEDURE — 94799 UNLISTED PULMONARY SVC/PX: CPT

## 2023-10-23 PROCEDURE — 81001 URINALYSIS AUTO W/SCOPE: CPT | Performed by: EMERGENCY MEDICINE

## 2023-10-23 PROCEDURE — 70450 CT HEAD/BRAIN W/O DYE: CPT

## 2023-10-23 PROCEDURE — 25010000002 MORPHINE PER 10 MG: Performed by: INTERNAL MEDICINE

## 2023-10-23 PROCEDURE — 85730 THROMBOPLASTIN TIME PARTIAL: CPT | Performed by: EMERGENCY MEDICINE

## 2023-10-23 PROCEDURE — 25010000002 ONDANSETRON PER 1 MG: Performed by: PHYSICIAN ASSISTANT

## 2023-10-23 PROCEDURE — 74176 CT ABD & PELVIS W/O CONTRAST: CPT

## 2023-10-23 RX ORDER — ALBUTEROL SULFATE 0.63 MG/3ML
0.63 SOLUTION RESPIRATORY (INHALATION)
Status: DISCONTINUED | OUTPATIENT
Start: 2023-10-23 | End: 2023-10-25 | Stop reason: HOSPADM

## 2023-10-23 RX ORDER — BISACODYL 5 MG/1
5 TABLET, DELAYED RELEASE ORAL DAILY PRN
Status: DISCONTINUED | OUTPATIENT
Start: 2023-10-23 | End: 2023-10-25 | Stop reason: HOSPADM

## 2023-10-23 RX ORDER — SODIUM CHLORIDE 0.9 % (FLUSH) 0.9 %
10 SYRINGE (ML) INJECTION AS NEEDED
Status: DISCONTINUED | OUTPATIENT
Start: 2023-10-23 | End: 2023-10-25 | Stop reason: HOSPADM

## 2023-10-23 RX ORDER — HYDROCODONE BITARTRATE AND ACETAMINOPHEN 10; 325 MG/1; MG/1
1 TABLET ORAL EVERY 6 HOURS PRN
Status: DISCONTINUED | OUTPATIENT
Start: 2023-10-23 | End: 2023-10-25 | Stop reason: HOSPADM

## 2023-10-23 RX ORDER — ONDANSETRON 4 MG/1
4 TABLET, FILM COATED ORAL EVERY 6 HOURS PRN
Status: DISCONTINUED | OUTPATIENT
Start: 2023-10-23 | End: 2023-10-25 | Stop reason: HOSPADM

## 2023-10-23 RX ORDER — ACETAMINOPHEN 650 MG/1
650 SUPPOSITORY RECTAL EVERY 4 HOURS PRN
Status: DISCONTINUED | OUTPATIENT
Start: 2023-10-23 | End: 2023-10-25 | Stop reason: HOSPADM

## 2023-10-23 RX ORDER — SODIUM CHLORIDE 0.9 % (FLUSH) 0.9 %
10 SYRINGE (ML) INJECTION EVERY 12 HOURS SCHEDULED
Status: DISCONTINUED | OUTPATIENT
Start: 2023-10-23 | End: 2023-10-25 | Stop reason: HOSPADM

## 2023-10-23 RX ORDER — LACOSAMIDE 50 MG/1
50 TABLET ORAL EVERY 12 HOURS SCHEDULED
Status: DISCONTINUED | OUTPATIENT
Start: 2023-10-23 | End: 2023-10-24

## 2023-10-23 RX ORDER — BISACODYL 10 MG
10 SUPPOSITORY, RECTAL RECTAL DAILY PRN
Status: DISCONTINUED | OUTPATIENT
Start: 2023-10-23 | End: 2023-10-25 | Stop reason: HOSPADM

## 2023-10-23 RX ORDER — CETIRIZINE HYDROCHLORIDE 10 MG/1
10 TABLET ORAL DAILY PRN
Status: DISCONTINUED | OUTPATIENT
Start: 2023-10-23 | End: 2023-10-25 | Stop reason: HOSPADM

## 2023-10-23 RX ORDER — ONDANSETRON 2 MG/ML
4 INJECTION INTRAMUSCULAR; INTRAVENOUS ONCE
Status: COMPLETED | OUTPATIENT
Start: 2023-10-23 | End: 2023-10-23

## 2023-10-23 RX ORDER — SODIUM CHLORIDE 9 MG/ML
40 INJECTION, SOLUTION INTRAVENOUS AS NEEDED
Status: DISCONTINUED | OUTPATIENT
Start: 2023-10-23 | End: 2023-10-25 | Stop reason: HOSPADM

## 2023-10-23 RX ORDER — HYDROXYCHLOROQUINE SULFATE 200 MG/1
200 TABLET, FILM COATED ORAL 2 TIMES DAILY
Status: DISCONTINUED | OUTPATIENT
Start: 2023-10-23 | End: 2023-10-25 | Stop reason: HOSPADM

## 2023-10-23 RX ORDER — PANTOPRAZOLE SODIUM 40 MG/10ML
40 INJECTION, POWDER, LYOPHILIZED, FOR SOLUTION INTRAVENOUS ONCE
Status: COMPLETED | OUTPATIENT
Start: 2023-10-23 | End: 2023-10-23

## 2023-10-23 RX ORDER — DILTIAZEM HYDROCHLORIDE 240 MG/1
240 CAPSULE, COATED, EXTENDED RELEASE ORAL DAILY
Status: DISCONTINUED | OUTPATIENT
Start: 2023-10-23 | End: 2023-10-25 | Stop reason: HOSPADM

## 2023-10-23 RX ORDER — ONDANSETRON 2 MG/ML
4 INJECTION INTRAMUSCULAR; INTRAVENOUS EVERY 6 HOURS PRN
Status: DISCONTINUED | OUTPATIENT
Start: 2023-10-23 | End: 2023-10-23 | Stop reason: SDUPTHER

## 2023-10-23 RX ORDER — LEVOTHYROXINE SODIUM 0.07 MG/1
75 TABLET ORAL
Status: DISCONTINUED | OUTPATIENT
Start: 2023-10-24 | End: 2023-10-25 | Stop reason: HOSPADM

## 2023-10-23 RX ORDER — ALUMINA, MAGNESIA, AND SIMETHICONE 2400; 2400; 240 MG/30ML; MG/30ML; MG/30ML
15 SUSPENSION ORAL EVERY 6 HOURS PRN
Status: DISCONTINUED | OUTPATIENT
Start: 2023-10-23 | End: 2023-10-25 | Stop reason: HOSPADM

## 2023-10-23 RX ORDER — CHOLECALCIFEROL (VITAMIN D3) 125 MCG
5 CAPSULE ORAL NIGHTLY PRN
Status: DISCONTINUED | OUTPATIENT
Start: 2023-10-23 | End: 2023-10-25 | Stop reason: HOSPADM

## 2023-10-23 RX ORDER — ACETAMINOPHEN 325 MG/1
650 TABLET ORAL EVERY 4 HOURS PRN
Status: DISCONTINUED | OUTPATIENT
Start: 2023-10-23 | End: 2023-10-25 | Stop reason: HOSPADM

## 2023-10-23 RX ORDER — SUCRALFATE 1 G/1
1 TABLET ORAL 4 TIMES DAILY
Status: DISCONTINUED | OUTPATIENT
Start: 2023-10-23 | End: 2023-10-25 | Stop reason: HOSPADM

## 2023-10-23 RX ORDER — MECLIZINE HYDROCHLORIDE 25 MG/1
25 TABLET ORAL 3 TIMES DAILY PRN
Status: DISCONTINUED | OUTPATIENT
Start: 2023-10-23 | End: 2023-10-25 | Stop reason: HOSPADM

## 2023-10-23 RX ORDER — PANTOPRAZOLE SODIUM 40 MG/1
40 TABLET, DELAYED RELEASE ORAL
Status: DISCONTINUED | OUTPATIENT
Start: 2023-10-24 | End: 2023-10-24

## 2023-10-23 RX ORDER — BUDESONIDE AND FORMOTEROL FUMARATE DIHYDRATE 160; 4.5 UG/1; UG/1
1 AEROSOL RESPIRATORY (INHALATION)
Status: DISCONTINUED | OUTPATIENT
Start: 2023-10-23 | End: 2023-10-25 | Stop reason: HOSPADM

## 2023-10-23 RX ORDER — ONDANSETRON 2 MG/ML
4 INJECTION INTRAMUSCULAR; INTRAVENOUS EVERY 6 HOURS PRN
Status: DISCONTINUED | OUTPATIENT
Start: 2023-10-23 | End: 2023-10-25 | Stop reason: HOSPADM

## 2023-10-23 RX ORDER — POTASSIUM CHLORIDE 20 MEQ/1
40 TABLET, EXTENDED RELEASE ORAL EVERY 4 HOURS
Status: COMPLETED | OUTPATIENT
Start: 2023-10-23 | End: 2023-10-24

## 2023-10-23 RX ORDER — TOPIRAMATE 100 MG/1
250 TABLET, FILM COATED ORAL 2 TIMES DAILY
COMMUNITY
End: 2023-10-25

## 2023-10-23 RX ORDER — POLYETHYLENE GLYCOL 3350 17 G/17G
17 POWDER, FOR SOLUTION ORAL DAILY PRN
Status: DISCONTINUED | OUTPATIENT
Start: 2023-10-23 | End: 2023-10-25 | Stop reason: HOSPADM

## 2023-10-23 RX ORDER — ALBUTEROL SULFATE 90 UG/1
2 AEROSOL, METERED RESPIRATORY (INHALATION) EVERY 4 HOURS PRN
Status: DISCONTINUED | OUTPATIENT
Start: 2023-10-23 | End: 2023-10-23

## 2023-10-23 RX ORDER — ACETAMINOPHEN 160 MG/5ML
650 SOLUTION ORAL EVERY 4 HOURS PRN
Status: DISCONTINUED | OUTPATIENT
Start: 2023-10-23 | End: 2023-10-25 | Stop reason: HOSPADM

## 2023-10-23 RX ADMIN — LACOSAMIDE 50 MG: 50 TABLET, FILM COATED ORAL at 20:22

## 2023-10-23 RX ADMIN — MORPHINE SULFATE 4 MG: 4 INJECTION, SOLUTION INTRAMUSCULAR; INTRAVENOUS at 14:13

## 2023-10-23 RX ADMIN — BUDESONIDE AND FORMOTEROL FUMARATE DIHYDRATE 1 PUFF: 160; 4.5 AEROSOL RESPIRATORY (INHALATION) at 20:10

## 2023-10-23 RX ADMIN — MECLIZINE HYDROCHLORIDE 25 MG: 25 TABLET ORAL at 20:21

## 2023-10-23 RX ADMIN — MORPHINE SULFATE 4 MG: 4 INJECTION, SOLUTION INTRAMUSCULAR; INTRAVENOUS at 22:02

## 2023-10-23 RX ADMIN — ONDANSETRON 4 MG: 2 INJECTION INTRAMUSCULAR; INTRAVENOUS at 14:14

## 2023-10-23 RX ADMIN — SUCRALFATE 1 G: 1 TABLET ORAL at 20:26

## 2023-10-23 RX ADMIN — PANTOPRAZOLE SODIUM 40 MG: 40 INJECTION, POWDER, LYOPHILIZED, FOR SOLUTION INTRAVENOUS at 14:09

## 2023-10-23 RX ADMIN — DILTIAZEM HYDROCHLORIDE 240 MG: 240 CAPSULE, COATED, EXTENDED RELEASE ORAL at 20:24

## 2023-10-23 RX ADMIN — HYDROXYCHLOROQUINE SULFATE 200 MG: 200 TABLET ORAL at 21:57

## 2023-10-23 RX ADMIN — POTASSIUM CHLORIDE 40 MEQ: 1500 TABLET, EXTENDED RELEASE ORAL at 20:26

## 2023-10-23 RX ADMIN — ALBUTEROL SULFATE 0.63 MG: 0.63 SOLUTION RESPIRATORY (INHALATION) at 20:05

## 2023-10-23 RX ADMIN — TOPIRAMATE 250 MG: 100 TABLET, FILM COATED ORAL at 21:55

## 2023-10-23 RX ADMIN — MORPHINE SULFATE 4 MG: 4 INJECTION, SOLUTION INTRAMUSCULAR; INTRAVENOUS at 17:55

## 2023-10-23 RX ADMIN — Medication 10 ML: at 21:58

## 2023-10-23 RX ADMIN — ONDANSETRON 4 MG: 2 INJECTION INTRAMUSCULAR; INTRAVENOUS at 18:33

## 2023-10-23 NOTE — ED PROVIDER NOTES
Subjective   History of Present Illness  Chief complaint: Patient is a 58-year-old who presents to the emergency department with upper abdominal pain and decreased ability to eat.  She is nauseous.  She has not been vomiting and feels like if she could that would improve her symptoms.  This has been progressive over the last 4 weeks.  She states that it so severe she came to the emergency department here the other day.  She had lab work drawn which did not reveal any severe abnormality and she was discharged with Carafate and Protonix.  She has been taking that it is not improving her symptoms.  They are only worsening.  She saw her primary physician today and has an appointment scheduled now with Dr. Donato of GI.  However that is not for some weeks and her family doctor wanted her to be seen in the emergency department.  She went to [facility] and waited for 3 hours and was not seen so they switched and came back here to the emergency department.  She has never had any history to this extent before.  She is concerned it is because she has been on and off different seizure medications for the last year.  She was diagnosed with seizure disorder of November last year.  She was switched to Vimpat 2 months ago and thinks it may be secondary to that.  She is also having decreased with memory and forgetting periods of time.  She called her neurologist about that but they have not returned her call yet.  No new increased seizures.  She has not had fever.    Context:    Duration:    Timing:    Severity: Severe    Associated Symptoms:        PCP:  LMP:      Review of Systems   Constitutional:  Negative for fever.   Respiratory: Negative.     Cardiovascular: Negative.    Gastrointestinal:  Positive for abdominal pain and nausea.   Genitourinary: Negative.    Musculoskeletal: Negative.    Neurological:  Positive for light-headedness.   Psychiatric/Behavioral:  Positive for confusion.        Past Medical History:    Diagnosis Date    Acute non-recurrent maxillary sinusitis 05/11/2022    Allergic     Anemia     Asthma     COVID-19 07/28/2021    COVID-19 05/21/2022    Epilepsy     Headache     Heart disease     HL (hearing loss)     Hypertension     Hypothyroidism     Kidney disease     Renal insufficiency     Seizures     Sjogren's disease     Solitary lung nodule 09/27/2022    Thyroid disease     Tremor     Upper respiratory tract infection 11/08/2021       Allergies   Allergen Reactions    Contrast Dye (Echo Or Unknown Ct/Mr) Itching and Shortness Of Breath    Sulfa Antibiotics Swelling    Tizanidine Mental Status Change and Hallucinations    Iodinated Contrast Media Itching    Breztri Aerosphere [Budeson-Glycopyrrol-Formoterol] Photosensitivity     Double vision    Oxcarbazepine Confusion       Past Surgical History:   Procedure Laterality Date    APPENDECTOMY      BRAIN SURGERY      occipital Neurotomy    CARDIAC CATHETERIZATION      CHOLECYSTECTOMY      OCCIPITAL NEURECTOMY  2010    OVARY SURGERY      SINUS SURGERY         Family History   Problem Relation Age of Onset    Hypertension Mother     Cancer Father     Arthritis Sister     Heart disease Brother     No Known Problems Brother     No Known Problems Brother        Social History     Socioeconomic History    Marital status:    Tobacco Use    Smoking status: Never     Passive exposure: Never    Smokeless tobacco: Never   Vaping Use    Vaping Use: Never used   Substance and Sexual Activity    Alcohol use: No    Drug use: Never    Sexual activity: Yes     Partners: Male     Birth control/protection: Post-menopausal           Objective   Physical Exam  Vitals and nursing note reviewed.   Cardiovascular:      Rate and Rhythm: Normal rate and regular rhythm.   Pulmonary:      Effort: Pulmonary effort is normal.      Breath sounds: Normal breath sounds.   Abdominal:      General: Abdomen is flat.      Palpations: Abdomen is soft.      Tenderness: There is abdominal  tenderness in the epigastric area. There is guarding. There is no rebound.   Skin:     General: Skin is warm and dry.   Neurological:      General: No focal deficit present.      Mental Status: She is oriented to person, place, and time.   Psychiatric:         Mood and Affect: Mood is anxious.         Procedures           ED Course           Results for orders placed or performed during the hospital encounter of 10/23/23   Comprehensive Metabolic Panel    Specimen: Blood   Result Value Ref Range    Glucose 91 65 - 99 mg/dL    BUN 15 6 - 20 mg/dL    Creatinine 0.90 0.57 - 1.00 mg/dL    Sodium 141 136 - 145 mmol/L    Potassium 3.6 3.5 - 5.2 mmol/L    Chloride 107 98 - 107 mmol/L    CO2 20.0 (L) 22.0 - 29.0 mmol/L    Calcium 9.8 8.6 - 10.5 mg/dL    Total Protein 6.9 6.0 - 8.5 g/dL    Albumin 4.4 3.5 - 5.2 g/dL    ALT (SGPT) 7 1 - 33 U/L    AST (SGOT) 13 1 - 32 U/L    Alkaline Phosphatase 112 39 - 117 U/L    Total Bilirubin 0.2 0.0 - 1.2 mg/dL    Globulin 2.5 gm/dL    A/G Ratio 1.8 g/dL    BUN/Creatinine Ratio 16.7 7.0 - 25.0    Anion Gap 14.0 5.0 - 15.0 mmol/L    eGFR 74.7 >60.0 mL/min/1.73   Protime-INR    Specimen: Blood   Result Value Ref Range    Protime 12.4 (H) 9.6 - 11.7 Seconds    INR 1.15 (H) 0.93 - 1.10   aPTT    Specimen: Blood   Result Value Ref Range    PTT 25.7 (L) 61.0 - 76.5 seconds   Lipase    Specimen: Blood   Result Value Ref Range    Lipase 18 13 - 60 U/L   Urinalysis With Culture If Indicated - Urine, Clean Catch    Specimen: Urine, Clean Catch   Result Value Ref Range    Color, UA Yellow Yellow, Straw    Appearance, UA Clear Clear    pH, UA 5.5 5.0 - 8.0    Specific Gravity, UA 1.019 1.005 - 1.030    Glucose, UA Negative Negative    Ketones, UA 40 mg/dL (2+) (A) Negative    Bilirubin, UA Negative Negative    Blood, UA Negative Negative    Protein, UA 30 mg/dL (1+) (A) Negative    Leuk Esterase, UA Small (1+) (A) Negative    Nitrite, UA Negative Negative    Urobilinogen, UA 1.0 E.U./dL 0.2 - 1.0  E.U./dL   CBC Auto Differential    Specimen: Blood   Result Value Ref Range    WBC 4.20 3.40 - 10.80 10*3/mm3    RBC 4.57 3.77 - 5.28 10*6/mm3    Hemoglobin 11.8 (L) 12.0 - 15.9 g/dL    Hematocrit 36.6 34.0 - 46.6 %    MCV 80.2 79.0 - 97.0 fL    MCH 25.8 (L) 26.6 - 33.0 pg    MCHC 32.1 31.5 - 35.7 g/dL    RDW 17.0 (H) 12.3 - 15.4 %    RDW-SD 50.8 37.0 - 54.0 fl    MPV 9.4 6.0 - 12.0 fL    Platelets 163 140 - 450 10*3/mm3    Neutrophil % 81.4 (H) 42.7 - 76.0 %    Lymphocyte % 10.7 (L) 19.6 - 45.3 %    Monocyte % 6.6 5.0 - 12.0 %    Eosinophil % 0.8 0.3 - 6.2 %    Basophil % 0.5 0.0 - 1.5 %    Neutrophils, Absolute 3.50 1.70 - 7.00 10*3/mm3    Lymphocytes, Absolute 0.50 (L) 0.70 - 3.10 10*3/mm3    Monocytes, Absolute 0.30 0.10 - 0.90 10*3/mm3    Eosinophils, Absolute 0.00 0.00 - 0.40 10*3/mm3    Basophils, Absolute 0.00 0.00 - 0.20 10*3/mm3    nRBC 0.1 0.0 - 0.2 /100 WBC   Urinalysis, Microscopic Only - Urine, Clean Catch    Specimen: Urine, Clean Catch   Result Value Ref Range    RBC, UA 3-5 (A) None Seen, 0-2 /HPF    WBC, UA 0-2 None Seen, 0-2 /HPF    Bacteria, UA None Seen None Seen /HPF    Squamous Epithelial Cells, UA 0-2 None Seen, 0-2 /HPF    Hyaline Casts, UA None Seen None Seen /LPF    Methodology Automated Microscopy    [facility]   Result Value Ref Range    Extra Tube Hold for add-ons.             CT Abdomen Pelvis Without Contrast    Result Date: 10/23/2023  1. Noncalcified nodules at the lung bases similar to prior CT chest. Please see CT chest comparison for further recommendations 2. Nonobstructing renal calculi bilaterally 3. Dilation of the common bile duct compatible with postcholecystectomy ectasia. Please correlate with liver function tests and bilirubin levels if clinically indicated 4. Other incidental findings as above Electronically Signed: David Guerra MD  10/23/2023 3:14 PM EDT  Workstation ID: [id number]    CT Head Without Contrast    Result Date: 10/23/2023  Impression: No significant  findings. Electronically Signed: Shanta Alegre MD  10/23/2023 3:00 PM EDT  Workstation ID: [id number]                              Medical Decision Making  Patient was seen and evaluated for the above problem    Differential diagnosis includes but is not limited to bowel perforation, stomach perforation, bowel obstruction, gastritis,    Patient is on Vimpat and side effects are similar confusion that she is experiencing potentially.  However potentially other causes are involved as well including brain tumor.  CT head shows no mass lesion or hemorrhage.  I did obtain CT of the abdomen as well.  She did not have one the other day and she is persisting with pain and worsening.  This shows no signs of bowel perforation.  Potential this is all medication reaction, however peptic ulcer disease or severe gastritis could be an issue as well.  Discussed with Tania on-call for the hospitalist who agrees to admit for further gastroenterology/neurologic monitoring and consultation.  Patient verbalized understanding.    Problems Addressed:  Abdominal pain, unspecified abdominal location: complicated acute illness or injury  Confusion: complicated acute illness or injury    Amount and/or Complexity of Data Reviewed  Labs: ordered. Decision-making details documented in ED Course.     Details: Labs reviewed by myself   Radiology: ordered and independent interpretation performed.     Details: CT scan reviewed by myself as above    Risk  Prescription drug management.  Decision regarding hospitalization.        Final diagnoses:   None     Abdominal pain  Intermittent confusion  ED Disposition  ED Disposition       None            No follow-up provider specified.       Medication List      No changes were made to your prescriptions during this visit.            Luis F Torres,   10/23/23 [id number]

## 2023-10-23 NOTE — ED NOTES
Pt states she was just started on Vimpat about 6 weeks ago for hx of seizures. Pt states she has a history of partial seizures and that she sees a neurologist due to seizures.

## 2023-10-23 NOTE — PROGRESS NOTES
"Chief Complaint  Abdominal Pain      Tasneem Valentino presents to [facility]      Subjective      58-year-old female patient presents today with complaints of abdominal pain.  She is accompanied by her . Tasneem gives permission to discuss today's visit & treatment plan with accompanied person.    10/21/2023 patient went to [facility] ER with abdominal pain for a few months.  Has been with nausea, no vomiting or diarrhea.  No abnormal labs, no evidence of UTI.  Given Toradol Zofran and discharged with Protonix Carafate Zofran. She was advised to follow up with GI.  several years ago was established with [facility] in [place], [place] -Dr. Donato. She has not seen them in more than three years. She has lost three pounds in the past few days.  She reports \"being unable to eat \". She continues with nausea and states the Zofran is not helping.     She called neurology Dr. Santiago, to tell him that she is thinking the Vimpat is causing her GI issues. Additionally, she is with confusion and forgetfulness since taking the Vimpat which has worsened over time. She has yet to hear back from neurology. She called the office today.      DREW scheduled November 6, 2023.                   Objective         Vital Signs:     /71 (BP Location: Right arm, Patient Position: Sitting, Cuff Size: Adult)   Pulse 88   Temp 97.7 °F (36.5 °C) (Infrared)   Ht 167.6 cm (65.98\")   Wt 58.5 kg (129 lb)   SpO2 100%   BMI 20.83 kg/m²       Physical Exam  Vitals reviewed.   Constitutional:       Appearance: She is well-developed.      Comments:      HENT:      Head: Normocephalic and atraumatic.      Nose: Nose normal.      Mouth/Throat:      Mouth: Mucous membranes are moist.      Pharynx: Oropharynx is clear.   Eyes:      Conjunctiva/sclera: Conjunctivae normal.      Pupils: Pupils are equal, round, and reactive to light.   Cardiovascular:      Rate and Rhythm: Normal rate.      Pulses: Normal pulses.      " Heart sounds: Normal heart sounds.   Pulmonary:      Effort: Pulmonary effort is normal. No respiratory distress.      Breath sounds: Normal breath sounds. No stridor. No wheezing, rhonchi or rales.   Chest:      Chest wall: No tenderness.   Abdominal:      General: Abdomen is flat. Bowel sounds are normal.      Palpations: Abdomen is soft. There is no shifting dullness, fluid wave, hepatomegaly, splenomegaly, mass or pulsatile mass.      Tenderness: There is generalized abdominal tenderness.          Comments: Generalized abdominal tenderness.   Musculoskeletal:         General: Normal range of motion.      Cervical back: Normal range of motion.   Skin:     General: Skin is warm and dry.      Findings: No rash.   Neurological:      Mental Status: She is alert and oriented to person, place, and time.   Psychiatric:         Behavior: Behavior normal.                History of Present Illness      Patient Active Problem List   Diagnosis    Chronic daily headache    Intractable chronic migraine without aura    Lupus (systemic lupus erythematosus)    Pain in joint involving multiple sites    Anemia    Anxiety    Migraine    Meniere's disease    Hypothyroidism    Depressive disorder    Coronary artery spasm    Aortic insufficiency    Hypertension    History of Lyme disease    Kidney disease, chronic, stage II (GFR 60-89 ml/min)    Nodule of skin of left upper extremity    Weakness of right upper extremity    Vitamin D deficiency    History of renal stone    Nausea    Cervicalgia    Cervical spondylosis without myelopathy    Neuroforaminal stenosis of cervical spine    Cervical radiculopathy    Hoarseness of voice    Screen for colon cancer    Essential hypertension    Acute non-recurrent maxillary sinusitis    SOB (shortness of breath) on exertion    Moderate persistent asthma without complication    Solitary lung nodule    Hoarse voice quality    Medication refill    Sore throat    MELTON (dyspnea on exertion)    Multiple  pulmonary nodules    Intermittent lightheadedness    Dizziness    Partial symptomatic epilepsy with complex partial seizures, not intractable, without status epilepticus    Screening, lipid    Stomach ache    Stress at home    Generalized abdominal pain         Past Medical History:   Diagnosis Date    Acute non-recurrent maxillary sinusitis 05/11/2022    Allergic     Anemia     Asthma     COVID-19 07/28/2021    COVID-19 05/21/2022    Epilepsy     Headache     Heart disease     HL (hearing loss)     Hypertension     Hypothyroidism     Kidney disease     Renal insufficiency     Seizures     Sjogren's disease     Solitary lung nodule 09/27/2022    Thyroid disease     Tremor     Upper respiratory tract infection 11/08/2021          Family History   Problem Relation Age of Onset    Hypertension Mother     Cancer Father     Arthritis Sister     Heart disease Brother     No Known Problems Brother     No Known Problems Brother           Past Surgical History:   Procedure Laterality Date    APPENDECTOMY      BRAIN SURGERY      occipital Neurotomy    CARDIAC CATHETERIZATION      CHOLECYSTECTOMY      OCCIPITAL NEURECTOMY  2010    OVARY SURGERY      SINUS SURGERY            Social History     Socioeconomic History    Marital status:    Tobacco Use    Smoking status: Never     Passive exposure: Never    Smokeless tobacco: Never   Vaping Use    Vaping Use: Never used   Substance and Sexual Activity    Alcohol use: No    Drug use: Never    Sexual activity: Yes     Partners: Male     Birth control/protection: Post-menopausal                    Result Review :                                  BMI is within normal parameters. No other follow-up for BMI required.               Assessment and Plan      Diagnoses and all orders for this visit:    1. Generalized abdominal pain (Primary)  -     Ambulatory Referral to Gastroenterology    2. Screen for colon cancer  -     Ambulatory Referral to Gastroenterology      Unsure of  patient's cause of abdominal pain.  She is with generalized abdominal pain with palpation in all quadrants on examination today.  No organomegaly noted.  Bowel sounds positive.  She has had GI issues with oxcarbazepine in the past similar to this.  It is possible that the lacosamide is causing her GI discomfort and nausea.  The weight loss is concerning.  I placed a referral last year for her to see gastroenterology however patient did not follow-up.  Placing a new referral today.  It is my recommendation patient go back to emergency room today since she is in so much abdominal discomfort.                    Follow Up       No follow-ups on file.      Patient was given instructions and counseling regarding her condition or for health maintenance advice. Please see specific information pulled into the AVS if appropriate.     Piper Levine, APRN10/23/202315:09 EDT  This note has been electronically signed

## 2023-10-23 NOTE — ED NOTES
Nursing report ED to floor  Tasneem Valentino  58 y.o.  female    HPI:   Chief Complaint   Patient presents with    Abdominal Pain     Abd pain, epigastric pain, states she was here Saturday sent to PMD today and then [facility], left and came here due to the wait.  Pt nausea no vomiting not eating, states she has lot a lot of weight.         Admitting doctor:   Maco Dee MD    Admitting diagnosis:   The primary encounter diagnosis was Abdominal pain, unspecified abdominal location. A diagnosis of Confusion was also pertinent to this visit.    Code status:   Current Code Status       Date Active Code Status Order ID Comments User Context       Not on file            Allergies:   Contrast dye (echo or unknown ct/mr), Sulfa antibiotics, Tizanidine, Iodinated contrast media, Breztri aerosphere [budeson-glycopyrrol-formoterol], and Oxcarbazepine    Isolation:  No active isolations     Fall Risk:  Fall Risk Assessment was completed, and patient is at low risk for falls.   Predictive Model Details         6 (Low) Factor Value    Calculated 10/23/2023 17:21 Age 58    Risk of Fall Model Musculoskeletal Assessment WDL     Active Peripheral IV Present     Imaging order in this encounter Present     Respiratory Rate 20     Skin Assessment WDL     Magnesium not on file     Number of Distinct Medication Classes administered 3     Drug Use No     Diastolic BP 71     Eber Scale not on file     Financial Class Private Insurance     Peripheral Vascular Assessment WDL     Number of administrations of Ulcer Drugs 1     Number of administrations of Analgesic Narcotics 1     Cardiac Assessment WDL     Chloride 107 mmol/L     Gastrointestinal Assessment X     Creatinine 0.9 mg/dL     Days after Admission 0.188     ALT 7 U/L     Albumin 4.4 g/dL     Total Bilirubin 0.2 mg/dL     Potassium 3.6 mmol/L     Calcium 9.8 mg/dL         Weight:       10/23/23  1249   Weight: 58.5 kg (129 lb)       Intake and Output  No intake or output data in the  24 hours ending 10/23/23 1721    Diet:   Dietary Orders (From admission, onward)       Start     Ordered    10/23/23 1255  NPO Diet NPO Type: Strict NPO  Diet Effective Now        Question:  NPO Type  Answer:  Strict NPO    10/23/23 1254                     Most recent vitals:   Vitals:    10/23/23 1401 10/23/23 1431 10/23/23 1531 10/23/23 1631   BP: 127/65  127/72 148/71   BP Location:       Patient Position:       Pulse: 67  66 64   Resp:       Temp:  97.9 °F (36.6 °C)     TempSrc:  Oral     SpO2: 99%  96% 100%   Weight:       Height:           Active LDAs/IV Access:   Lines, Drains & Airways       Active LDAs       Name Placement date Placement time Site Days    Peripheral IV 10/23/23 1409 Left Antecubital 10/23/23  1409  Antecubital  less than 1                    Skin Condition:   Skin Assessments (last day)       None             Labs (abnormal labs have a star):   Labs Reviewed   COMPREHENSIVE METABOLIC PANEL - Abnormal; Notable for the following components:       Result Value    CO2 20.0 (*)     All other components within normal limits    Narrative:     GFR Normal >60  Chronic Kidney Disease <60  Kidney Failure <15     PROTIME-INR - Abnormal; Notable for the following components:    Protime 12.4 (*)     INR 1.15 (*)     All other components within normal limits   APTT - Abnormal; Notable for the following components:    PTT 25.7 (*)     All other components within normal limits   URINALYSIS W/ CULTURE IF INDICATED - Abnormal; Notable for the following components:    Ketones, UA 40 mg/dL (2+) (*)     Protein, UA 30 mg/dL (1+) (*)     Leuk Esterase, UA Small (1+) (*)     All other components within normal limits    Narrative:     In absence of clinical symptoms, the presence of pyuria, bacteria, and/or nitrites on the urinalysis result does not correlate with infection.   CBC WITH AUTO DIFFERENTIAL - Abnormal; Notable for the following components:    Hemoglobin 11.8 (*)     MCH 25.8 (*)     RDW 17.0 (*)      Neutrophil % 81.4 (*)     Lymphocyte % 10.7 (*)     Lymphocytes, Absolute 0.50 (*)     All other components within normal limits   URINALYSIS, MICROSCOPIC ONLY - Abnormal; Notable for the following components:    RBC, UA 3-5 (*)     All other components within normal limits   LIPASE - Normal   CBC AND DIFFERENTIAL    Narrative:     The following orders were created for panel order CBC & Differential.  Procedure                               Abnormality         Status                     ---------                               -----------         ------                     CBC Auto Differential[[id number]]        Abnormal            Final result                 Please view results for these tests on the individual orders.   EXTRA TUBES    Narrative:     The following orders were created for panel order Extra Tubes.  Procedure                               Abnormality         Status                     ---------                               -----------         ------                     Gold Top - SST[[id number]]                                   Final result                 Please view results for these tests on the individual orders.   GOLD TOP - [facility]       LOC: Person, Place, Time, and Situation    Telemetry:  Med/Surg    Cardiac Monitoring Ordered: yes    EKG:   No orders to display       Medications Given in the ED:   Medications   sodium chloride 0.9 % flush 10 mL (has no administration in time range)   morphine injection 4 mg (has no administration in time range)   morphine injection 4 mg (4 mg Intravenous Given 10/23/23 1413)   ondansetron (ZOFRAN) injection 4 mg (4 mg Intravenous Given 10/23/23 1414)   pantoprazole (PROTONIX) injection 40 mg (40 mg Intravenous Given 10/23/23 1409)       Imaging results:  CT Abdomen Pelvis Without Contrast    Result Date: 10/23/2023  1. Noncalcified nodules at the lung bases similar to prior CT chest. Please see CT chest comparison for further recommendations 2. Nonobstructing  renal calculi bilaterally 3. Dilation of the common bile duct compatible with postcholecystectomy ectasia. Please correlate with liver function tests and bilirubin levels if clinically indicated 4. Other incidental findings as above Electronically Signed: David Guerra MD  10/23/2023 3:14 PM EDT  Workstation ID: [id number]    CT Head Without Contrast    Result Date: 10/23/2023  Impression: No significant findings. Electronically Signed: Shanta Alegre MD  10/23/2023 3:00 PM EDT  Workstation ID: [id number]     Social issues:   Social History     Socioeconomic History    Marital status:    Tobacco Use    Smoking status: Never     Passive exposure: Never    Smokeless tobacco: Never   Vaping Use    Vaping Use: Never used   Substance and Sexual Activity    Alcohol use: No    Drug use: Never    Sexual activity: Yes     Partners: Male     Birth control/protection: Post-menopausal       NIH Stroke Scale:  Interval: (not recorded)  1a. Level of Consciousness: (not recorded)  1b. LOC Questions: (not recorded)  1c. LOC Commands: (not recorded)  2. Best Gaze: (not recorded)  3. Visual: (not recorded)  4. Facial Palsy: (not recorded)  5a. Motor Arm, Left: (not recorded)  5b. Motor Arm, Right: (not recorded)  6a. Motor Leg, Left: (not recorded)  6b. Motor Leg, Right: (not recorded)  7. Limb Ataxia: (not recorded)  8. Sensory: (not recorded)  9. Best Language: (not recorded)  10. Dysarthria: (not recorded)  11. Extinction and Inattention (formerly Neglect): (not recorded)    Total (NIH Stroke Scale): (not recorded)     Additional notable assessment information:NA     Nursing report ED to floor:  Report handoff given to Samaria Stratton RN   10/23/23 17:21 EDT

## 2023-10-23 NOTE — H&P
[facility]  History & Physical    Patient Name: Tasneem Valentino  : 1965  MRN: [id number]  Primary Care Physician:  Piper Levine APRN  Date of admission: 10/23/2023      Subjective      Chief Complaint: Abdominal pain    History of Present Illness: Tasneem Valentino is a 58 y.o. female with a past medical history to include lupus, hypothyroidism, aortic regurgitation, seizure disorder who presented to [facility] on 10/23/2023 complaining of upper abdominal pain.  Patient reports that she was diagnosed with seizure disorder in 2022 and has been placed on antiseizure medications.  She states that these medications have been changed multiple times due to side effects such as abdominal pain.  She reports that she has lost 40 pounds since 2022, this as a known side effect also of the medications.  Reports that she was placed on Vimpat approximately 2 months ago.  She states that over the last 2 months she has had upper abdominal pain, nausea and diarrhea.  She states the symptoms increased since Friday.  She reports that she came to the emergency department on Saturday and was given Carafate, Zofran and PPI with follow-up to PCP.  She reports that she went to PCP today who recommended GI appointment but felt the patient was in too much distress and advised her to go to the emergency department.  Patient denies any lightheadedness or syncopal episodes.  She reports nausea without vomiting.  She denies having any chest pains or shortness of air.  She reports epigastric pain and tenderness upon palpation.  She denies having any melena, hematochezia or any hematemesis.  She endorses diarrhea that has worsened over the last few days.  She denies having any urinary symptoms.  She denies having any lower extremity edema.  Of note, patient reports history of cholecystectomy in  with stent placed by Dr. Donato at that time.  She also reports history of  cardiology follow-up for aortic regurg which she states is much better.  She also reports being followed by Dr. Gadnhi for pulmonary nodules seen on CT and is followed annually.  She states Dr. Gandhi ordered DREW to be performed by her cardiologist in [facility] [place] on November 6, 2023.  We been asked to admit this patient for further evaluation and treatment      ROS 12 point ROS reviewed and negative except as mentioned above     Personal History     Past Medical History:   Diagnosis Date    Acute non-recurrent maxillary sinusitis 05/11/2022    Allergic     Anemia     Asthma     COVID-19 07/28/2021    COVID-19 05/21/2022    Epilepsy     Headache     Heart disease     HL (hearing loss)     Hypertension     Hypothyroidism     Kidney disease     Renal insufficiency     Seizures     Sjogren's disease     Solitary lung nodule 09/27/2022    Thyroid disease     Tremor     Upper respiratory tract infection 11/08/2021       Past Surgical History:   Procedure Laterality Date    APPENDECTOMY      BRAIN SURGERY      occipital Neurotomy    CARDIAC CATHETERIZATION      CHOLECYSTECTOMY      OCCIPITAL NEURECTOMY  2010    OVARY SURGERY      SINUS SURGERY         Family History: family history includes Arthritis in her sister; Cancer in her father; Heart disease in her brother; Hypertension in her mother; No Known Problems in her brother and brother. Otherwise pertinent FHx was reviewed and not pertinent to current issue.    Social History:  reports that she has never smoked. She has never been exposed to tobacco smoke. She has never used smokeless tobacco. She reports that she does not drink alcohol and does not use drugs.    Home Medications:  Prior to Admission Medications       Prescriptions Last Dose Informant Patient Reported? Taking?    albuterol sulfate  (90 Base) MCG/ACT inhaler   No No    INHALE 2 PUFFS EVERY 4 HOURS AS NEEDED FOR WHEEZING    Breo Ellipta 100-25 MCG/ACT aerosol powder  10/22/2023  Yes Yes    Inhale 1  puff Daily.    cetirizine (zyrTEC) 10 MG tablet   Yes No    Take 1 tablet by mouth Daily As Needed.    dilTIAZem CD (CARDIZEM CD) 240 MG 24 hr capsule 10/22/2023  Yes Yes    Take 1 capsule by mouth Daily.    fluticasone (FLONASE) 50 MCG/ACT nasal spray   Yes No    2 sprays into the nostril(s) as directed by provider As Needed for Rhinitis.    HYDROcodone-acetaminophen (Norco)  MG per tablet 10/22/2023  No Yes    Take 1 tablet by mouth Every 6 (Six) Hours As Needed for Moderate Pain.    hydroxychloroquine (PLAQUENIL) 200 MG tablet 10/22/2023  Yes Yes    Take 1 tablet by mouth 2 (Two) Times a Day.    lacosamide (VIMPAT) 50 MG tablet tablet 10/22/2023  Yes Yes    Take 2 tablets by mouth Every 12 (Twelve) Hours.    levothyroxine (SYNTHROID, LEVOTHROID) 75 MCG tablet 10/22/2023  No Yes    Take 1 tablet by mouth Daily.    meclizine (ANTIVERT) 25 MG tablet   Yes No    Take 1 tablet by mouth 3 (Three) Times a Day As Needed.    nitroglycerin (NITROSTAT) 0.4 MG SL tablet   No No    Place 1 tablet under the tongue Every 5 (Five) Minutes As Needed for Chest Pain. Take no more than 3 doses in 15 minutes.    omeprazole (priLOSEC) 20 MG capsule 10/22/2023  No Yes    TAKE 1 CAPSULE BY MOUTH DAILY    ondansetron (ZOFRAN) 4 MG tablet   No No    Take 1 tablet by mouth Every 8 (Eight) Hours As Needed for Nausea or Vomiting.    ondansetron ODT (ZOFRAN-ODT) 4 MG disintegrating tablet   No No    Place 1 tablet on the tongue Every 8 (Eight) Hours As Needed for Nausea.    pantoprazole (PROTONIX) 40 MG EC tablet 10/22/2023  No Yes    Take 1 tablet by mouth Daily.    sertraline (Zoloft) 50 MG tablet 10/22/2023  No Yes    Take 1 tablet by mouth Daily.    sucralfate (CARAFATE) 1 g tablet 10/22/2023  No Yes    Take 1 tablet by mouth 4 (Four) Times a Day.    SUMAtriptan (IMITREX) 100 MG tablet   No No    Take 1 tablet by mouth 1 (One) Time As Needed for Migraine. Take one tablet at onset of headache. May repeat dose one time in 2 hours if  headache not relieved.    topiramate (TOPAMAX) 100 MG tablet 10/22/2023  Yes Yes    Take 2.5 tablets by mouth 2 (Two) Times a Day.              Allergies:  Allergies   Allergen Reactions    Contrast Dye (Echo Or Unknown Ct/Mr) Itching and Shortness Of Breath    Sulfa Antibiotics Swelling    Tizanidine Mental Status Change and Hallucinations    Iodinated Contrast Media Itching    Breztri Aerosphere [Budeson-Glycopyrrol-Formoterol] Photosensitivity     Double vision    Oxcarbazepine Confusion       Objective      Vitals:   Temp:  [97.7 °F (36.5 °C)-97.9 °F (36.6 °C)] 97.9 °F (36.6 °C)  Heart Rate:  [64-88] 64  Resp:  [20] 20  BP: (104-148)/(65-72) 148/71    Physical Exam  Constitutional:       General: She is not in acute distress.     Appearance: She is ill-appearing.   HENT:      Head: Normocephalic and atraumatic.   Cardiovascular:      Rate and Rhythm: Normal rate and regular rhythm.      Pulses: Normal pulses.      Heart sounds: No murmur heard.  Pulmonary:      Effort: Pulmonary effort is normal. No respiratory distress.      Breath sounds: Normal breath sounds.   Abdominal:      Palpations: Abdomen is soft.      Tenderness: There is abdominal tenderness.   Musculoskeletal:         General: Normal range of motion.      Cervical back: Normal range of motion and neck supple.      Right lower leg: No edema.      Left lower leg: No edema.   Skin:     General: Skin is warm and dry.   Neurological:      General: No focal deficit present.      Mental Status: She is alert and oriented to person, place, and time.   Psychiatric:         Mood and Affect: Mood normal.         Behavior: Behavior normal.            Result Review    Result Review:  I have personally reviewed the results from the time of this admission to 10/23/2023 18:57 EDT and agree with these findings:  [x]  Laboratory  []  Microbiology  [x]  Radiology  [x]  EKG/Telemetry   [x]  Cardiology/Vascular   []  Pathology  []  Old records  []  Other:    In the  emergency department, vital signs stable.  Sodium 141.  Potassium 3.6.  Creatinine 0.90.  Glucose 91.  Lipase 18.  Pro time 12.4.  INR 1.15.  PTT 25.7.  Hemoglobin 11.8.  Platelets 163.  UA negative.    CT head without contrast no significant finding.    CT Abdomen w/o contrast Noncalcified nodules at the lung bases similar to prior CT chest. Please see CT chest comparison for further recommendations. Nonobstructing renal calculi bilaterally. Dilation of the common bile duct compatible with postcholecystectomy ectasia. Please correlate with liver function tests and bilirubin levels if clinically indicated     Assessment & Plan        Active Hospital Problems:  Active Hospital Problems    Diagnosis     **Abdominal pain      Plan:     Upper Abdomin Pain   Weight loss  -CT Abdomen w/o contrast Noncalcified nodules at the lung bases similar to prior CT chest. Please see CT chest comparison for further recommendations. Nonobstructing renal calculi bilaterally. Dilation of the common bile duct compatible with postcholecystectomy ectasia. Please correlate with liver function tests and bilirubin levels if clinically indicated   -Lipase 18, AST 13 ALT 7 total bilirubin 0.6.  -Continue Protonix and Zofran and Carafate  -Hemoglobin 11.8 (12.1)  -GI panel, stool study  -N.p.o. at midnight   -GI consult  -Check troponin    Seizure disorder/epilepsy  -Patient currently on Vimpat, questionable abdominal pain secondary to multiple seizure medication changes.  Patient has been on Vimpat for approximately 2 months that symptoms began.  -Neurology consultation  -Seizure precautions    COPD not in exacerbation  -Continue home inhalers    Lupus  Sjogren's disease  -Continue Plaquenil    Hypothyroidism  -Continue home levothyroxine    Hypertension  -Continue diltiazem    Lung nodules  -Continue follow-up in outpatient clinic with PCP for CT, noncalcified nodules also seen on prior studies.  (Images 7/25/2022.  7/17/2023)  -Patient is  followed by Dr. Gandhi    DVT prophylaxis:  Mechanical DVT prophylaxis orders are present.    CODE STATUS:    Code Status (Patient has no pulse and is not breathing): CPR (Attempt to Resuscitate)  Medical Interventions (Patient has pulse or is breathing): Full Support    Admission Status:  I believe this patient meets observation status.    I discussed the patient's findings and my recommendations with patient.    Signature: Electronically signed by Andie Echols PA-C, 10/23/23, 18:57 EDT.  [facility] Hospitalist Team

## 2023-10-23 NOTE — ED NOTES
Pt presented to the ED with complaints of abdominal pain going on for about a month. Pt states the pain worsened on Friday. Pt states she was seen in the ED on Saturday night and sent home with medications. Pt states pain did not ease up with medications prescribed. Pt states she went into her doctor's office this morning and after speaking with them they recommended she come back to the ED. Pt states it is a stabbing pain in the center upper anterior region of her abdomen. Pt states her back also is in pain. Pt denies chest pain and SOB. Pt's  is at bedside. Pt's call light is within reach and pt was given instructions on how to use it.

## 2023-10-23 NOTE — Clinical Note
Level of Care: Med/Surg [1]   Diagnosis: Abdominal pain [178714]   Admitting Physician: KEITH RANDLE [[id number]]   Attending Physician: KEITH RANDLE [[id number]]   unknown

## 2023-10-24 ENCOUNTER — ANESTHESIA (OUTPATIENT)
Dept: GASTROENTEROLOGY | Facility: HOSPITAL | Age: 58
End: 2023-10-24
Payer: COMMERCIAL

## 2023-10-24 ENCOUNTER — APPOINTMENT (OUTPATIENT)
Dept: MRI IMAGING | Facility: HOSPITAL | Age: 58
End: 2023-10-24
Payer: COMMERCIAL

## 2023-10-24 ENCOUNTER — INPATIENT HOSPITAL (OUTPATIENT)
Dept: URBAN - METROPOLITAN AREA HOSPITAL 84 | Facility: HOSPITAL | Age: 58
End: 2023-10-24

## 2023-10-24 ENCOUNTER — ANESTHESIA EVENT (OUTPATIENT)
Dept: GASTROENTEROLOGY | Facility: HOSPITAL | Age: 58
End: 2023-10-24
Payer: COMMERCIAL

## 2023-10-24 DIAGNOSIS — R13.10 DYSPHAGIA, UNSPECIFIED: ICD-10-CM

## 2023-10-24 DIAGNOSIS — K29.70 GASTRITIS, UNSPECIFIED, WITHOUT BLEEDING: ICD-10-CM

## 2023-10-24 LAB
AMMONIA BLD-SCNC: 12 UMOL/L (ref 11–51)
ANION GAP SERPL CALCULATED.3IONS-SCNC: 14 MMOL/L (ref 5–15)
BASOPHILS # BLD AUTO: 0 10*3/MM3 (ref 0–0.2)
BASOPHILS NFR BLD AUTO: 0.6 % (ref 0–1.5)
BUN SERPL-MCNC: 20 MG/DL (ref 6–20)
BUN/CREAT SERPL: 22.2 (ref 7–25)
CALCIUM SPEC-SCNC: 9.8 MG/DL (ref 8.6–10.5)
CHLORIDE SERPL-SCNC: 107 MMOL/L (ref 98–107)
CO2 SERPL-SCNC: 17 MMOL/L (ref 22–29)
CREAT SERPL-MCNC: 0.9 MG/DL (ref 0.57–1)
DEPRECATED RDW RBC AUTO: 49.4 FL (ref 37–54)
EGFRCR SERPLBLD CKD-EPI 2021: 74.7 ML/MIN/1.73
EOSINOPHIL # BLD AUTO: 0.1 10*3/MM3 (ref 0–0.4)
EOSINOPHIL NFR BLD AUTO: 2.2 % (ref 0.3–6.2)
ERYTHROCYTE [DISTWIDTH] IN BLOOD BY AUTOMATED COUNT: 17.1 % (ref 12.3–15.4)
GLUCOSE BLDC GLUCOMTR-MCNC: 112 MG/DL (ref 70–105)
GLUCOSE BLDC GLUCOMTR-MCNC: 245 MG/DL (ref 70–105)
GLUCOSE BLDC GLUCOMTR-MCNC: 53 MG/DL (ref 70–105)
GLUCOSE SERPL-MCNC: 53 MG/DL (ref 65–99)
HCT VFR BLD AUTO: 35.4 % (ref 34–46.6)
HGB BLD-MCNC: 11 G/DL (ref 12–15.9)
LYMPHOCYTES # BLD AUTO: 0.9 10*3/MM3 (ref 0.7–3.1)
LYMPHOCYTES NFR BLD AUTO: 20.9 % (ref 19.6–45.3)
MCH RBC QN AUTO: 25.5 PG (ref 26.6–33)
MCHC RBC AUTO-ENTMCNC: 31 G/DL (ref 31.5–35.7)
MCV RBC AUTO: 82.3 FL (ref 79–97)
MONOCYTES # BLD AUTO: 0.4 10*3/MM3 (ref 0.1–0.9)
MONOCYTES NFR BLD AUTO: 9.6 % (ref 5–12)
NEUTROPHILS NFR BLD AUTO: 2.9 10*3/MM3 (ref 1.7–7)
NEUTROPHILS NFR BLD AUTO: 66.7 % (ref 42.7–76)
NRBC BLD AUTO-RTO: 0 /100 WBC (ref 0–0.2)
PLATELET # BLD AUTO: 164 10*3/MM3 (ref 140–450)
PMV BLD AUTO: 9.1 FL (ref 6–12)
POTASSIUM SERPL-SCNC: 4.7 MMOL/L (ref 3.5–5.2)
RBC # BLD AUTO: 4.3 10*6/MM3 (ref 3.77–5.28)
SODIUM SERPL-SCNC: 138 MMOL/L (ref 136–145)
WBC NRBC COR # BLD: 4.4 10*3/MM3 (ref 3.4–10.8)

## 2023-10-24 PROCEDURE — A9579 GAD-BASE MR CONTRAST NOS,1ML: HCPCS | Performed by: INTERNAL MEDICINE

## 2023-10-24 PROCEDURE — G0378 HOSPITAL OBSERVATION PER HR: HCPCS

## 2023-10-24 PROCEDURE — 25010000002 MORPHINE PER 10 MG: Performed by: STUDENT IN AN ORGANIZED HEALTH CARE EDUCATION/TRAINING PROGRAM

## 2023-10-24 PROCEDURE — 25810000003 LACTATED RINGERS PER 1000 ML: Performed by: ANESTHESIOLOGIST ASSISTANT

## 2023-10-24 PROCEDURE — 70553 MRI BRAIN STEM W/O & W/DYE: CPT

## 2023-10-24 PROCEDURE — 25010000002 ONDANSETRON PER 1 MG: Performed by: ANESTHESIOLOGY

## 2023-10-24 PROCEDURE — 96374 THER/PROPH/DIAG INJ IV PUSH: CPT

## 2023-10-24 PROCEDURE — 82140 ASSAY OF AMMONIA: CPT | Performed by: NURSE PRACTITIONER

## 2023-10-24 PROCEDURE — 25010000002 ONDANSETRON PER 1 MG: Performed by: INTERNAL MEDICINE

## 2023-10-24 PROCEDURE — 96361 HYDRATE IV INFUSION ADD-ON: CPT

## 2023-10-24 PROCEDURE — 70544 MR ANGIOGRAPHY HEAD W/O DYE: CPT

## 2023-10-24 PROCEDURE — 25010000002 MORPHINE PER 10 MG: Performed by: INTERNAL MEDICINE

## 2023-10-24 PROCEDURE — 43239 EGD BIOPSY SINGLE/MULTIPLE: CPT | Performed by: INTERNAL MEDICINE

## 2023-10-24 PROCEDURE — 85025 COMPLETE CBC W/AUTO DIFF WBC: CPT | Performed by: PHYSICIAN ASSISTANT

## 2023-10-24 PROCEDURE — 82948 REAGENT STRIP/BLOOD GLUCOSE: CPT

## 2023-10-24 PROCEDURE — 25010000002 PROPOFOL 10 MG/ML EMULSION: Performed by: ANESTHESIOLOGIST ASSISTANT

## 2023-10-24 PROCEDURE — 88305 TISSUE EXAM BY PATHOLOGIST: CPT | Performed by: INTERNAL MEDICINE

## 2023-10-24 PROCEDURE — 96375 TX/PRO/DX INJ NEW DRUG ADDON: CPT

## 2023-10-24 PROCEDURE — 94799 UNLISTED PULMONARY SVC/PX: CPT

## 2023-10-24 PROCEDURE — 25010000002 ONDANSETRON PER 1 MG: Performed by: PHYSICIAN ASSISTANT

## 2023-10-24 PROCEDURE — 25810000003 LACTATED RINGERS PER 1000 ML: Performed by: INTERNAL MEDICINE

## 2023-10-24 PROCEDURE — 96376 TX/PRO/DX INJ SAME DRUG ADON: CPT

## 2023-10-24 PROCEDURE — 25010000002 GADOTERIDOL PER 1 ML: Performed by: INTERNAL MEDICINE

## 2023-10-24 PROCEDURE — 99214 OFFICE O/P EST MOD 30 MIN: CPT | Performed by: NURSE PRACTITIONER

## 2023-10-24 PROCEDURE — 25810000003 LACTATED RINGERS PER 1000 ML: Performed by: NURSE PRACTITIONER

## 2023-10-24 PROCEDURE — 99284 EMERGENCY DEPT VISIT MOD MDM: CPT

## 2023-10-24 PROCEDURE — 80048 BASIC METABOLIC PNL TOTAL CA: CPT | Performed by: PHYSICIAN ASSISTANT

## 2023-10-24 RX ORDER — TOPIRAMATE 25 MG/1
50 TABLET ORAL EVERY 12 HOURS SCHEDULED
Status: DISCONTINUED | OUTPATIENT
Start: 2023-10-31 | End: 2023-10-25 | Stop reason: HOSPADM

## 2023-10-24 RX ORDER — ONDANSETRON 2 MG/ML
4 INJECTION INTRAMUSCULAR; INTRAVENOUS ONCE
Status: DISCONTINUED | OUTPATIENT
Start: 2023-10-24 | End: 2023-10-24 | Stop reason: SDUPTHER

## 2023-10-24 RX ORDER — DEXTROSE MONOHYDRATE 25 G/50ML
INJECTION, SOLUTION INTRAVENOUS
Status: COMPLETED
Start: 2023-10-24 | End: 2023-10-24

## 2023-10-24 RX ORDER — SODIUM CHLORIDE, SODIUM LACTATE, POTASSIUM CHLORIDE, CALCIUM CHLORIDE 600; 310; 30; 20 MG/100ML; MG/100ML; MG/100ML; MG/100ML
75 INJECTION, SOLUTION INTRAVENOUS CONTINUOUS
Status: DISCONTINUED | OUTPATIENT
Start: 2023-10-24 | End: 2023-10-25 | Stop reason: HOSPADM

## 2023-10-24 RX ORDER — DEXTROSE MONOHYDRATE 50 MG/ML
50 INJECTION, SOLUTION INTRAVENOUS CONTINUOUS
Status: DISCONTINUED | OUTPATIENT
Start: 2023-10-24 | End: 2023-10-25 | Stop reason: HOSPADM

## 2023-10-24 RX ORDER — SUMATRIPTAN 50 MG/1
50 TABLET, FILM COATED ORAL
Status: DISCONTINUED | OUTPATIENT
Start: 2023-10-24 | End: 2023-10-25 | Stop reason: HOSPADM

## 2023-10-24 RX ORDER — ONDANSETRON 2 MG/ML
4 INJECTION INTRAMUSCULAR; INTRAVENOUS ONCE
Status: COMPLETED | OUTPATIENT
Start: 2023-10-24 | End: 2023-10-24

## 2023-10-24 RX ORDER — TOPIRAMATE 100 MG/1
200 TABLET, FILM COATED ORAL 2 TIMES DAILY
Status: DISCONTINUED | OUTPATIENT
Start: 2023-10-25 | End: 2023-10-24

## 2023-10-24 RX ORDER — SCOLOPAMINE TRANSDERMAL SYSTEM 1 MG/1
1 PATCH, EXTENDED RELEASE TRANSDERMAL ONCE
Status: DISCONTINUED | OUTPATIENT
Start: 2023-10-24 | End: 2023-10-24

## 2023-10-24 RX ORDER — TOPIRAMATE 25 MG/1
100 TABLET ORAL EVERY 12 HOURS SCHEDULED
Status: DISCONTINUED | OUTPATIENT
Start: 2023-10-29 | End: 2023-10-25 | Stop reason: HOSPADM

## 2023-10-24 RX ORDER — TOPIRAMATE 100 MG/1
200 TABLET, FILM COATED ORAL EVERY 12 HOURS SCHEDULED
Status: DISCONTINUED | OUTPATIENT
Start: 2023-10-25 | End: 2023-10-25 | Stop reason: HOSPADM

## 2023-10-24 RX ORDER — LACOSAMIDE 50 MG/1
150 TABLET ORAL EVERY 12 HOURS SCHEDULED
Status: DISCONTINUED | OUTPATIENT
Start: 2023-10-25 | End: 2023-10-25 | Stop reason: HOSPADM

## 2023-10-24 RX ORDER — ONDANSETRON 4 MG/1
4 TABLET, FILM COATED ORAL EVERY 6 HOURS PRN
Status: DISCONTINUED | OUTPATIENT
Start: 2023-10-24 | End: 2023-10-25 | Stop reason: HOSPADM

## 2023-10-24 RX ORDER — DEXTROSE MONOHYDRATE 25 G/50ML
50 INJECTION, SOLUTION INTRAVENOUS
Status: DISCONTINUED | OUTPATIENT
Start: 2023-10-24 | End: 2023-10-25 | Stop reason: HOSPADM

## 2023-10-24 RX ORDER — SODIUM CHLORIDE, SODIUM LACTATE, POTASSIUM CHLORIDE, CALCIUM CHLORIDE 600; 310; 30; 20 MG/100ML; MG/100ML; MG/100ML; MG/100ML
INJECTION, SOLUTION INTRAVENOUS CONTINUOUS PRN
Status: DISCONTINUED | OUTPATIENT
Start: 2023-10-24 | End: 2023-10-24 | Stop reason: SURG

## 2023-10-24 RX ORDER — PANTOPRAZOLE SODIUM 40 MG/1
40 TABLET, DELAYED RELEASE ORAL
Status: DISCONTINUED | OUTPATIENT
Start: 2023-10-24 | End: 2023-10-25 | Stop reason: HOSPADM

## 2023-10-24 RX ORDER — SCOLOPAMINE TRANSDERMAL SYSTEM 1 MG/1
1 PATCH, EXTENDED RELEASE TRANSDERMAL ONCE
Status: DISCONTINUED | OUTPATIENT
Start: 2023-10-24 | End: 2023-10-24 | Stop reason: SDUPTHER

## 2023-10-24 RX ORDER — ONDANSETRON 2 MG/ML
4 INJECTION INTRAMUSCULAR; INTRAVENOUS EVERY 6 HOURS PRN
Status: DISCONTINUED | OUTPATIENT
Start: 2023-10-24 | End: 2023-10-25 | Stop reason: HOSPADM

## 2023-10-24 RX ADMIN — ONDANSETRON 4 MG: 2 INJECTION INTRAMUSCULAR; INTRAVENOUS at 02:27

## 2023-10-24 RX ADMIN — SODIUM CHLORIDE, POTASSIUM CHLORIDE, SODIUM LACTATE AND CALCIUM CHLORIDE 75 ML/HR: 600; 310; 30; 20 INJECTION, SOLUTION INTRAVENOUS at 09:41

## 2023-10-24 RX ADMIN — SUMATRIPTAN SUCCINATE 50 MG: 50 TABLET ORAL at 21:14

## 2023-10-24 RX ADMIN — SUCRALFATE 1 G: 1 TABLET ORAL at 21:14

## 2023-10-24 RX ADMIN — Medication 10 ML: at 20:33

## 2023-10-24 RX ADMIN — TOPIRAMATE 250 MG: 100 TABLET, FILM COATED ORAL at 09:39

## 2023-10-24 RX ADMIN — MORPHINE SULFATE 4 MG: 4 INJECTION, SOLUTION INTRAMUSCULAR; INTRAVENOUS at 04:55

## 2023-10-24 RX ADMIN — DEXTROSE MONOHYDRATE 50 ML: 25 INJECTION, SOLUTION INTRAVENOUS at 06:04

## 2023-10-24 RX ADMIN — ONDANSETRON 4 MG: 2 INJECTION INTRAMUSCULAR; INTRAVENOUS at 21:35

## 2023-10-24 RX ADMIN — SODIUM CHLORIDE, SODIUM LACTATE, POTASSIUM CHLORIDE, AND CALCIUM CHLORIDE: .6; .31; .03; .02 INJECTION, SOLUTION INTRAVENOUS at 13:18

## 2023-10-24 RX ADMIN — PROPOFOL 100 MCG/KG/MIN: 10 INJECTION, EMULSION INTRAVENOUS at 13:19

## 2023-10-24 RX ADMIN — HYDROXYCHLOROQUINE SULFATE 200 MG: 200 TABLET ORAL at 21:14

## 2023-10-24 RX ADMIN — GADOTERIDOL 11 ML: 279.3 INJECTION, SOLUTION INTRAVENOUS at 18:09

## 2023-10-24 RX ADMIN — LEVOTHYROXINE SODIUM 75 MCG: 0.07 TABLET ORAL at 05:00

## 2023-10-24 RX ADMIN — SCOPALAMINE 1 PATCH: 1 PATCH, EXTENDED RELEASE TRANSDERMAL at 12:50

## 2023-10-24 RX ADMIN — PANTOPRAZOLE SODIUM 40 MG: 40 TABLET, DELAYED RELEASE ORAL at 05:00

## 2023-10-24 RX ADMIN — LACOSAMIDE 50 MG: 50 TABLET, FILM COATED ORAL at 21:14

## 2023-10-24 RX ADMIN — HYDROXYCHLOROQUINE SULFATE 200 MG: 200 TABLET ORAL at 09:41

## 2023-10-24 RX ADMIN — ONDANSETRON 4 MG: 2 INJECTION INTRAMUSCULAR; INTRAVENOUS at 09:00

## 2023-10-24 RX ADMIN — PANTOPRAZOLE SODIUM 40 MG: 40 TABLET, DELAYED RELEASE ORAL at 16:33

## 2023-10-24 RX ADMIN — Medication 10 ML: at 09:41

## 2023-10-24 RX ADMIN — POTASSIUM CHLORIDE 40 MEQ: 1500 TABLET, EXTENDED RELEASE ORAL at 02:26

## 2023-10-24 RX ADMIN — ONDANSETRON 4 MG: 2 INJECTION INTRAMUSCULAR; INTRAVENOUS at 12:51

## 2023-10-24 NOTE — CONSULTS
GI CONSULT  NOTE:    Referring Provider:  Dr. Dee    Chief complaint: Abdominal pain    Subjective .     History of present illness:   Patient is a 58-year-old female with history of seizures starting in November 2022, COPD, lupus, Sjogren's, cholecystectomy, and lung nodules.  She presented to the hospital originally 3 days ago and was given some medications and discharged home.  She presented back to the hospital yesterday with persistent upper abdominal pain.  She reports beginning to have the symptoms shortly after she started Lamictal.  This medication was stopped and she started Vimpat instead but the pain persisted.  It is located in the upper abdomen and radiates to her back.  It is constant and worse when she is moving around.  Feels like a burning and stabbing sensation.  She has associated nausea but no vomiting.  She was having some diarrhea but this is stopped lately.  She has lost some weight.  No NSAID use.  She reports having an ulcer in the past.      Endo History:  2012 ERCP by Dr. Donato -marked dilation of intra and extrahepatic biliary tree status post sphincterotomy and balloon sweep, PD stent placed.    Past Medical History:  Past Medical History:   Diagnosis Date    Acute non-recurrent maxillary sinusitis 05/11/2022    Allergic     Anemia     Asthma     COVID-19 07/28/2021    COVID-19 05/21/2022    Epilepsy     Headache     Heart disease     HL (hearing loss)     Hypertension     Hypothyroidism     Kidney disease     Lupus     Migraine     Renal insufficiency     Seizures     Sjogren's disease     Solitary lung nodule 09/27/2022    Thyroid disease     Tremor     Upper respiratory tract infection 11/08/2021       Past Surgical History:  Past Surgical History:   Procedure Laterality Date    APPENDECTOMY      BRAIN SURGERY      occipital Neurotomy    CARDIAC CATHETERIZATION      CHOLECYSTECTOMY      OCCIPITAL NEURECTOMY  2010    OVARY SURGERY      SINUS SURGERY         Social History:  Social  History     Tobacco Use    Smoking status: Never     Passive exposure: Never    Smokeless tobacco: Never   Vaping Use    Vaping Use: Never used   Substance Use Topics    Alcohol use: No    Drug use: Never       Family History:  Family History   Problem Relation Age of Onset    Hypertension Mother     Cancer Father     Arthritis Sister     Heart disease Brother     No Known Problems Brother     No Known Problems Brother        Medications:  (Not in a hospital admission)      Scheduled Meds:albuterol, 0.63 mg, Nebulization, Q4H - RT  budesonide-formoterol, 1 puff, Inhalation, BID - RT  dilTIAZem CD, 240 mg, Oral, Daily  hydroxychloroquine, 200 mg, Oral, BID  lacosamide, 50 mg, Oral, Q12H  levothyroxine, 75 mcg, Oral, Q AM  pantoprazole, 40 mg, Oral, Q AM  sertraline, 50 mg, Oral, Daily  sodium chloride, 10 mL, Intravenous, Q12H  sucralfate, 1 g, Oral, 4x Daily  topiramate, 250 mg, Oral, BID      Continuous Infusions:   PRN Meds:.  acetaminophen **OR** acetaminophen **OR** acetaminophen    aluminum-magnesium hydroxide-simethicone    polyethylene glycol **AND** bisacodyl **AND** bisacodyl    Calcium Replacement - Follow Nurse / BPA Driven Protocol    cetirizine    dextrose    HYDROcodone-acetaminophen    Magnesium Standard Dose Replacement - Follow Nurse / BPA Driven Protocol    meclizine    melatonin    Morphine    ondansetron **OR** ondansetron    Phosphorus Replacement - Follow Nurse / BPA Driven Protocol    Potassium Replacement - Follow Nurse / BPA Driven Protocol    [COMPLETED] Insert Peripheral IV **AND** sodium chloride    sodium chloride    sodium chloride    ALLERGIES:  Contrast dye (echo or unknown ct/mr), Sulfa antibiotics, Tizanidine, Iodinated contrast media, Breztri aerosphere [budeson-glycopyrrol-formoterol], and Oxcarbazepine    ROS:  Review of Systems   Constitutional:  Negative for chills and fever.   Respiratory:  Negative for cough and shortness of breath.    Cardiovascular:  Negative for chest  "pain.   Gastrointestinal:  Positive for abdominal pain, diarrhea and nausea. Negative for blood in stool and vomiting.   Musculoskeletal:  Positive for arthralgias and back pain.   Neurological:  Positive for weakness. Negative for dizziness.   Psychiatric/Behavioral:  Negative for agitation and confusion.        Objective     Vital Signs:   Visit Vitals  /68 (BP Location: Right arm)   Pulse 65   Temp 98.3 °F (36.8 °C) (Oral)   Resp 14   Ht 172.7 cm (68\")   Wt 58.5 kg (129 lb)   LMP  (LMP Unknown)   SpO2 100%   BMI 19.61 kg/m²       Physical Exam:      General Appearance:    Awake and alert, in no acute distress   Head:    Normocephalic, without obvious abnormality, atraumatic   Eyes:            Conjunctivae normal, anicteric sclera   Ears:    Ears appear intact with no abnormalities noted   Throat:   No oral lesions, no thrush, oral mucosa moist   Neck:   No adenopathy, supple, no thyromegaly, no JVD   Lungs:     Respirations regular, even and unlabored       Chest Wall:    No abnormalities observed   Abdomen:     soft, tender across upper abdomen, no rebound or guarding, non-distended   Rectal:     Deferred   Extremities:   Moves all extremities well, no edema, no cyanosis, no redness   Pulses:   Pulses palpable and equal bilaterally   Skin:   No bleeding, bruising or rash, no jaundice   Lymph nodes:   No palpable adenopathy   Neurologic:   Sensation intact       Results Review:   I reviewed the patient's labs and imaging.  CBC  Results from last 7 days   Lab Units 10/24/23  0511 10/23/23  1429 10/21/23  1821   RBC 10*6/mm3 4.30 4.57 4.69   WBC 10*3/mm3 4.40 4.20 5.40   HEMOGLOBIN g/dL 11.0* 11.8* 12.1   PLATELETS 10*3/mm3 164 163 177       CMP  Results from last 7 days   Lab Units 10/24/23  0511 10/23/23  1429 10/21/23  1821   SODIUM mmol/L 138 141 141   POTASSIUM mmol/L 4.7 3.6 3.7   CHLORIDE mmol/L 107 107 108*   CO2 mmol/L 17.0* 20.0* 19.0*   BUN mg/dL 20 15 14   CREATININE mg/dL 0.90 0.90 1.05* "   GLUCOSE mg/dL 53* 91 104*   ALBUMIN g/dL  --  4.4 4.5   BILIRUBIN mg/dL  --  0.2 0.2   ALK PHOS U/L  --  112 118*   AST (SGOT) U/L  --  13 14   ALT (SGPT) U/L  --  7 8       Amylase and Lipase  Results from last 7 days   Lab Units 10/23/23  1429 10/21/23  1821   LIPASE U/L 18 18       CRP         Imaging Results (Last 24 Hours)       Procedure Component Value Units Date/Time    CT Abdomen Pelvis Without Contrast [[id number]] Collected: 10/23/23 1459     Updated: 10/23/23 1516    Narrative:      CT ABDOMEN PELVIS WO CONTRAST    Date of Exam: 10/23/2023 2:45 PM EDT    Indication: abdominal pain.    Comparison: CT chest 7/17/2023    Technique: Axial CT images were obtained of the abdomen and pelvis without the administration of contrast. Sagittal and coronal reconstructions were performed.  Automated exposure control and iterative reconstruction methods were used.      FINDINGS:    Abdomen/Pelvis:    Study is limited by motion.    Lower Chest: Limited imaging of the lung bases demonstrates some scattered small 1 to 4 mm noncalcified nodules. These are better seen on prior CT chest comparison.    No free air noted below the diaphragm.    Organs: Nonobstructing calculi noted within the kidneys bilaterally measuring up to 5 mm on the right and 4 mm on the left. No evidence of obstructive uropathy noted. Visualized and expected course of the ureters demonstrate no definite acute   abnormality.    The patient is status post cholecystectomy. Limited noncontrast imaging of the liver is grossly unremarkable in appearance without significant intrahepatic biliary ductal dilation. Dilation of the common duct noted likely related to postcholecystectomy   ectasia. This measures up to 1 cm. Limited noncontrast imaging of the spleen, pancreas and adrenal glands demonstrate no acute abnormality    GI/Bowel: Limited noncontrast imaging of the stomach and the small bowel demonstrate no acute abnormality colon demonstrates no acute  abnormality Limited noncontrast imaging. Patient appears to be status post appendectomy. No suspicious mesenteric   adenopathy or fluid collections are noted    Pelvis: The bladder is decompressed. Uterus is grossly unremarkable in appearance. Ovaries were not well visualized. No suspicious fluid collections myopathy appreciated within the pelvis    Peritoneum/Retroperitoneum: The aorta is normal in caliber. No suspicious retroperitoneal adenopathy noted    Bones/Soft Tissues: No acute osseous abnormality      Impression:        1. Noncalcified nodules at the lung bases similar to prior CT chest. Please see CT chest comparison for further recommendations    2. Nonobstructing renal calculi bilaterally    3. Dilation of the common bile duct compatible with postcholecystectomy ectasia. Please correlate with liver function tests and bilirubin levels if clinically indicated    4. Other incidental findings as above          Electronically Signed: David Guerra MD    10/23/2023 3:14 PM EDT    Workstation ID: [id number]    CT Head Without Contrast [[id number]] Collected: 10/23/23 1459     Updated: 10/23/23 1502    Narrative:      CT HEAD WO CONTRAST    Date of Exam: 10/23/2023 2:45 PM EDT    Indication: difficulty thinking and memory impairment.    Comparison: None available.    Technique: Axial CT images were obtained of the head without contrast administration.  Coronal reconstructions were performed.  Automated exposure control and iterative reconstruction methods were used.      Findings:  There is mild coaptation of the lateral ventricles which is an anatomic variation. There is no acute mass effect or edema. There are no findings suspicious for acute CVA or hemorrhage. Visualized portions of paranasal sinuses are clear.      Impression:      Impression:  No significant findings.      Electronically Signed: Shanta Alegre MD    10/23/2023 3:00 PM EDT    Workstation ID: [id number]              ASSESSMENT AND  PLAN:  58-year-old female presented to the hospital with persistent upper abdominal pain.  Originally related this to any medication but has persisted.  She has also lost weight.    Upper abdominal pain  Nausea  Weight loss  Mild normocytic anemia  Seizures  COPD/lung nodules  Sjogren's disease  Lupus  History of cholecystectomy    Principal Problem:    Abdominal pain     Plan:  58-year-old female presented to the hospital yesterday with persistent upper abdominal pain.  She originally related this to starting Lamictal but medication was stopped and her pain has persisted.  It is a constant pain across the upper abdomen.  Associated with nausea and she has lost weight.  CT without contrast is unremarkable.  Liver enzymes and lipase are normal.  Hemoglobin 11.  She reports history of ulcer.  She remained n.p.o. today and we will proceed with EGD for further evaluation.  Increase PPI to twice daily.  Also start IV fluids and provide supportive care.    I discussed the patients findings and my recommendations with the patient.  Kimber Berry, APRN  10/24/23  09:11 EDT

## 2023-10-24 NOTE — ANESTHESIA POSTPROCEDURE EVALUATION
Patient: Tasneem Valentino    Procedure Summary       Date: 10/24/23 Room / Location: [facility] ENDOSCOPY 1 / [facility] ENDOSCOPY    Anesthesia Start: 1318 Anesthesia Stop: 1329    Procedure: ESOPHAGOGASTRODUODENOSCOPY WITH BIOPSY X1 AREA Diagnosis:       Abdominal pain, unspecified abdominal location      (Abdominal pain, unspecified abdominal location [R10.9])    Surgeons: Wesley Davies MD Provider: Kurtis Beltran MD    Anesthesia Type: general ASA Status: 3            Anesthesia Type: general    Vitals  Vitals Value Taken Time   /63 10/24/23 1400   Temp     Pulse 62 10/24/23 1403   Resp 18 10/24/23 1344   SpO2 99 % 10/24/23 1403   Vitals shown include unfiled device data.        Post Anesthesia Care and Evaluation    Patient location during evaluation: PACU  Patient participation: complete - patient participated  Level of consciousness: awake  Pain scale: See nurse's notes for pain score.  Pain management: adequate    Airway patency: patent  Anesthetic complications: No anesthetic complications  PONV Status: none  Cardiovascular status: acceptable  Respiratory status: acceptable and spontaneous ventilation  Hydration status: acceptable    Comments: Patient seen and examined postoperatively; vital signs stable; SpO2 greater than or equal to 90%; cardiopulmonary status stable; nausea/vomiting adequately controlled; pain adequately controlled; no apparent anesthesia complications; patient discharged from anesthesia care when discharge criteria were met

## 2023-10-24 NOTE — OP NOTE
ESOPHAGOGASTRODUODENOSCOPY Procedure Report    Patient Name:  Tasneem Valentino  YOB: 1965    Date of Surgery:  10/24/2023     Pre-Op Diagnosis:  1.  Epigastric pain    Post-Op Diagnosis:  1.  Nonerosive gastritis       Procedure/CPT® Codes:      Procedure(s):  ESOPHAGOGASTRODUODENOSCOPY WITH BIOPSY X1 AREA    Staff:  Surgeon(s):  Wesley Davies MD      Anesthesia: Monitored Anesthesia Care    Description of Procedure:  A description of the procedure as well as risks, benefits and alternative methods were explained to the patient who voiced understanding and signed the corresponding consent form. A physical exam was performed and vital signs were monitored throughout the procedure.    After informed consent was obtained, the patient was placed in the left lateral decubitus position and sedated as above.  The Olympus video gastroscope was inserted into the oropharynx and the esophagus was intubated without difficulty.  Examination of the esophagus, stomach, pylorus, duodenal bulb, and second portion of the duodenum was performed without difficulty. The scope was then retroflexed and the fundus was visualized. The procedure was not difficult and there were no immediate complications.  There was no blood loss.    Findings:  Esophagus: Normal  Stomach: Diffuse erythema in the antrum consistent with nonerosive gastritis.  Biopsies taken from the gastric antrum and body to rule out H. pylori.  Duodenum: Normal    Impression:  1.  Nonerosive gastritis    Recommendations:  1.  Follow-up on gastric biopsies  2.  Continue pantoprazole twice daily  3.  Full liquid diet  4.  Schedule CT scan of abdomen and pelvis with IV and oral contrast after steroid prep  5.  Recheck laboratory studies tomorrow morning      Wesley Davies MD     Date: 10/24/2023    Time: 13:29 EDT      .

## 2023-10-24 NOTE — PROGRESS NOTES
[facility] Medicine Services Daily Progress Note    Patient Name: Tasneem Valentino  : 1965  MRN: [id number]  Primary Care Physician:  Piper Levine APRN  Date of admission: 10/23/2023      Subjective      Chief Complaint: Abdominal pain     History of Present Illness: Tasneem Valentino is a 58 y.o. female with a past medical history to include lupus, hypothyroidism, aortic regurgitation, seizure disorder who presented to [facility] on 10/23/2023 complaining of upper abdominal pain.  Patient reports that she was diagnosed with seizure disorder in 2022 and has been placed on antiseizure medications.  She states that these medications have been changed multiple times due to side effects such as abdominal pain.  She reports that she has lost 40 pounds since 2022, this as a known side effect also of the medications.  Reports that she was placed on Vimpat approximately 2 months ago.  She states that over the last 2 months she has had upper abdominal pain, nausea and diarrhea.  She states the symptoms increased since Friday.  She reports that she came to the emergency department on Saturday and was given Carafate, Zofran and PPI with follow-up to PCP.  She reports that she went to PCP today who recommended GI appointment but felt the patient was in too much distress and advised her to go to the emergency department.  Patient denies any lightheadedness or syncopal episodes.  She reports nausea without vomiting.  She denies having any chest pains or shortness of air.  She reports epigastric pain and tenderness upon palpation.  She denies having any melena, hematochezia or any hematemesis.  She endorses diarrhea that has worsened over the last few days.  She denies having any urinary symptoms.  She denies having any lower extremity edema.  Of note, patient reports history of cholecystectomy in  with stent placed by Dr. Donato at that time.  She also reports history  of cardiology follow-up for aortic regurg which she states is much better.  She also reports being followed by Dr. Gandhi for pulmonary nodules seen on CT and is followed annually.  She states Dr. Gandhi ordered DREW to be performed by her cardiologist in [facility] on November 6, 2023.  We been asked to admit this patient for further evaluation and treatment        10/24/23 c/o abdominal pain and nausea, for EGD today, DW RN, VSS, no rectal bleeding    ROS 12 point ROS reviewed and negative except as mentioned above       Objective      Vitals:   Temp:  [97.8 °F (36.6 °C)-98.5 °F (36.9 °C)] 98.3 °F (36.8 °C)  Heart Rate:  [64-85] 65  Resp:  [14-20] 14  BP: (115-148)/(65-74) 125/68    Physical Exam    General: She is not in acute distress.     Appearance: She is ill-appearing.   HENT:      Head: Normocephalic and atraumatic.   Cardiovascular:      Rate and Rhythm: Normal rate and regular rhythm.      Pulses: Normal pulses.      Heart sounds: No murmur heard.  Pulmonary:      Effort: Pulmonary effort is normal. No respiratory distress.      Breath sounds: Normal breath sounds.   Abdominal:      Palpations: Abdomen is soft.      Tenderness: There is abdominal tenderness.   Musculoskeletal:         General: Normal range of motion.      Cervical back: Normal range of motion and neck supple.      Right lower leg: No edema.      Left lower leg: No edema.   Skin:     General: Skin is warm and dry.   Neurological:      General: No focal deficit present.      Mental Status: She is alert and oriented to person, place, and time.   Psychiatric:         Mood and Affect: Mood normal.         Behavior: Behavior normal.           Result Review    Result Review:  I have personally reviewed the results from the time of this admission to 10/24/2023 11:21 EDT and agree with these findings:  []  Laboratory  []  Microbiology  []  Radiology  []  EKG/Telemetry   []  Cardiology/Vascular   []  Pathology  []  Old records  []  Other:  Most  "notable findings include:     CBC:      Lab 10/24/23  0511 10/23/23  1429 10/21/23  1821   WBC 4.40 4.20 5.40   HEMOGLOBIN 11.0* 11.8* 12.1   HEMATOCRIT 35.4 36.6 37.8   PLATELETS 164 163 177   NEUTROS ABS 2.90 3.50 4.00   LYMPHS ABS 0.90 0.50* 0.70   MONOS ABS 0.40 0.30 0.50   EOS ABS 0.10 0.00 0.10   MCV 82.3 80.2 80.7     CMP:        Lab 10/24/23  0511 10/23/23  1429 10/21/23  1821   SODIUM 138 141 141   POTASSIUM 4.7 3.6 3.7   CHLORIDE 107 107 108*   CO2 17.0* 20.0* 19.0*   ANION GAP 14.0 14.0 14.0   BUN 20 15 14   CREATININE 0.90 0.90 1.05*   EGFR 74.7 74.7 62.1   GLUCOSE 53* 91 104*   CALCIUM 9.8 9.8 9.8   TOTAL PROTEIN  --  6.9 7.1   ALBUMIN  --  4.4 4.5   GLOBULIN  --  2.5 2.6   ALT (SGPT)  --  7 8   AST (SGOT)  --  13 14   BILIRUBIN  --  0.2 0.2   ALK PHOS  --  112 118*   LIPASE  --  18 18     No results found for: \"ACANTHNAEG\", \"AFBCX\", \"BPERTUSSISCX\", \"BLOODCX\"  No results found for: \"BCIDPCR\", \"CXREFLEX\", \"CSFCX\", \"CULTURETIS\"  No results found for: \"CULTURES\", \"HSVCX\", \"URCX\"  No results found for: \"EYECULTURE\", \"GCCX\", \"HSVCULTURE\", \"LABHSV\"  No results found for: \"LEGIONELLA\", \"MRSACX\", \"MUMPSCX\", \"MYCOPLASCX\"  No results found for: \"NOCARDIACX\", \"STOOLCX\"  No results found for: \"THROATCX\", \"UNSTIMCULT\", \"URINECX\", \"CULTURE\", \"VZVCULTUR\"  No results found for: \"VIRALCULTU\", \"WOUNDCX\"      Assessment & Plan      Brief Patient Summary:  Tasneem Valentino is a 58 y.o. female who       albuterol, 0.63 mg, Nebulization, Q4H - RT  budesonide-formoterol, 1 puff, Inhalation, BID - RT  dilTIAZem CD, 240 mg, Oral, Daily  hydroxychloroquine, 200 mg, Oral, BID  lacosamide, 50 mg, Oral, Q12H  levothyroxine, 75 mcg, Oral, Q AM  pantoprazole, 40 mg, Oral, BID AC  sertraline, 50 mg, Oral, Daily  sodium chloride, 10 mL, Intravenous, Q12H  sucralfate, 1 g, Oral, 4x Daily  topiramate, 250 mg, Oral, BID       lactated ringers, 75 mL/hr, Last Rate: 75 mL/hr (10/24/23 0941)         Active Hospital Problems:  Active Hospital " Problems    Diagnosis     **Abdominal pain      Upper Abdomin Pain   Weight loss  -CT Abdomen w/o contrast Noncalcified nodules at the lung bases similar to prior CT chest. Please see CT chest comparison for further recommendations. Nonobstructing renal calculi bilaterally. Dilation of the common bile duct compatible with postcholecystectomy ectasia. Please correlate with liver function tests and bilirubin levels if clinically indicated   -Lipase 18, AST 13 ALT 7 total bilirubin 0.6.  -Continue Protonix and Zofran and Carafate  -Hemoglobin 11.8 (12.1)  -GI panel, stool study  -GI consulted for EGD  -Check [id number]     Seizure disorder/epilepsy  -Patient currently on Vimpat, questionable abdominal pain secondary to multiple seizure medication changes.  Patient has been on Vimpat for approximately 2 months that symptoms began.  -Neurology consultation  -Seizure precautions     COPD not in exacerbation  -Continue home inhalers     Lupus-Sjogren's disease  -Continue Plaquenil     Hypothyroidism  -Continue home levothyroxine     Hypertension  -Continue diltiazem     Lung nodules  -Continue follow-up in outpatient clinic with PCP for CT, noncalcified nodules also seen on prior studies.  (Images 7/25/2022.  7/17/2023)  -Patient is followed by Dr. Gandhi     hypoglycemia- D5 w added.      DVT prophylaxis:  Mechanical DVT prophylaxis orders are present.    CODE STATUS:    Code Status (Patient has no pulse and is not breathing): CPR (Attempt to Resuscitate)  Medical Interventions (Patient has pulse or is breathing): Full Support      Disposition:  I expect patient to be discharged home.    I have utilized all available, immediate resources to obtain, update, or review the patient's current medications including all prescriptions, over-the-counter products, herbals, cannabis/cannabidiol products, and vitamin/mineral/dietary (nutritional) supplements.      Code Status (Patient has no pulse and is not breathing): CPR  (Attempt to Resuscitate)  Medical Interventions (Patient has pulse or is breathing): Full Support    Next of kin or Power of :     Admission Status:  I believe this patient meets admit status.      Electronically signed by Kamlesh Santamaria MD, 10/24/23, 11:21 EDT.  [facility] Hospitalist Team

## 2023-10-24 NOTE — PLAN OF CARE
Goal Outcome Evaluation:                   EGD done today. Advanced to clear liquid diet. MRI with contrast ordered. CT A/P with oral contrast ordered as well. Pt in seizure precautions. Vitals stable.

## 2023-10-24 NOTE — ANESTHESIA PREPROCEDURE EVALUATION
Anesthesia Evaluation     Patient summary reviewed and Nursing notes reviewed   NPO Solid Status: > 8 hours  NPO Liquid Status: > 8 hours           Airway   Mallampati: II  TM distance: >3 FB  Neck ROM: full  No difficulty expected  Dental - normal exam     Pulmonary    (+) asthma,  Cardiovascular     (+) hypertension, CAD      Neuro/Psych  (+) seizures, headaches, dizziness/light headedness, tremors, numbness, psychiatric history Anxiety  GI/Hepatic/Renal/Endo    (+) renal disease-, thyroid problem     Musculoskeletal     (+) neck pain  Abdominal    Substance History      OB/GYN          Other   arthritis, autoimmune disease Sjogren syndrome,     ROS/Med Hx Other: Estimated left ventricular EF was in agreement with the calculated left ventricular EF. Left ventricular ejection fraction appears to be 61 - 65%. Left ventricular systolic function is normal.  ·  Left ventricular diastolic function was normal.  ·  The right ventricular cavity is borderline dilated.  ·  Estimated right ventricular systolic pressure from tricuspid regurgitation is normal (<35 mmHg).                Anesthesia Plan    ASA 3     general     intravenous induction     Anesthetic plan, risks, benefits, and alternatives have been provided, discussed and informed consent has been obtained with: patient.    Plan discussed with CRNA.    CODE STATUS:    Code Status (Patient has no pulse and is not breathing): CPR (Attempt to Resuscitate)  Medical Interventions (Patient has pulse or is breathing): Full Support

## 2023-10-24 NOTE — PLAN OF CARE
Problem: Adult Inpatient Plan of Care  Goal: Plan of Care Review  Outcome: Ongoing, Progressing  Goal: Patient-Specific Goal (Individualized)  Outcome: Ongoing, Progressing  Goal: Absence of Hospital-Acquired Illness or Injury  Outcome: Ongoing, Progressing  Intervention: Identify and Manage Fall Risk  Recent Flowsheet Documentation  Taken 10/24/2023 0200 by Joanne Miller RN  Safety Promotion/Fall Prevention:   assistive device/personal items within reach   clutter free environment maintained   fall prevention program maintained   lighting adjusted   mobility aid in reach   nonskid shoes/slippers when out of bed   room organization consistent   safety round/check completed  Taken 10/24/2023 0005 by Joanne Miller RN  Safety Promotion/Fall Prevention:   clutter free environment maintained   assistive device/personal items within reach   fall prevention program maintained   lighting adjusted   mobility aid in reach   nonskid shoes/slippers when out of bed   room organization consistent   safety round/check completed  Taken 10/23/2023 2200 by Joanne Miller RN  Safety Promotion/Fall Prevention:   assistive device/personal items within reach   clutter free environment maintained   fall prevention program maintained   mobility aid in reach   lighting adjusted   nonskid shoes/slippers when out of bed   room organization consistent   safety round/check completed  Intervention: Prevent Skin Injury  Recent Flowsheet Documentation  Taken 10/24/2023 0005 by Joanne Miller RN  Body Position:   position changed independently   side-lying   right  Skin Protection:   adhesive use limited   tubing/devices free from skin contact   transparent dressing maintained  Intervention: Prevent and Manage VTE (Venous Thromboembolism) Risk  Recent Flowsheet Documentation  Taken 10/24/2023 0005 by Joanne Miller RN  Range of Motion: active ROM (range of motion) encouraged  Intervention: Prevent Infection  Recent Flowsheet Documentation  Taken 10/24/2023  0200 by Lopp, Joanne, RN  Infection Prevention:   equipment surfaces disinfected   hand hygiene promoted   personal protective equipment utilized   rest/sleep promoted   single patient room provided  Taken 10/24/2023 0005 by Joanne Miller RN  Infection Prevention:   equipment surfaces disinfected   hand hygiene promoted   personal protective equipment utilized   rest/sleep promoted   single patient room provided  Taken 10/23/2023 2200 by Joanne Miller RN  Infection Prevention:   equipment surfaces disinfected   hand hygiene promoted   personal protective equipment utilized   rest/sleep promoted   single patient room provided  Goal: Optimal Comfort and Wellbeing  Outcome: Ongoing, Progressing  Goal: Readiness for Transition of Care  Outcome: Ongoing, Progressing     Problem: Nausea and Vomiting  Goal: Fluid and Electrolyte Balance  Outcome: Ongoing, Progressing  Intervention: Prevent and Manage Nausea and Vomiting  Recent Flowsheet Documentation  Taken 10/24/2023 0005 by Joanne Miller RN  Nausea/Vomiting Interventions:   slow deep breathing encouraged   nausea triggers minimized     Problem: Seizure, Active Management  Goal: Absence of Seizure/Seizure-Related Injury  Outcome: Ongoing, Progressing   Goal Outcome Evaluation:

## 2023-10-24 NOTE — CASE MANAGEMENT/SOCIAL WORK
Discharge Planning Assessment   Diogo     Patient Name: Tasneem Valentino  MRN: [id number]  Today's Date: 10/24/2023    Admit Date: 10/23/2023    Plan: Return Home   Discharge Needs Assessment       Row Name 10/24/23 0931       Living Environment    People in Home spouse    Name(s) of People in Home Jose-spouse    Current Living Arrangements home    Potentially Unsafe Housing Conditions none    Primary Care Provided by self    Provides Primary Care For no one    Family Caregiver if Needed spouse    Quality of Family Relationships helpful;involved;supportive    Able to Return to Prior Arrangements yes       Resource/Environmental Concerns    Resource/Environmental Concerns none       Food Insecurity    Within the past 12 months, you worried that your food would run out before you got the money to buy more. Never true    Within the past 12 months, the food you bought just didn't last and you didn't have money to get more. Never true       Transition Planning    Patient/Family Anticipates Transition to home;home with family    Transportation Anticipated family or friend will provide  spouse       Discharge Needs Assessment    Readmission Within the Last 30 Days no previous admission in last 30 days    Equipment Currently Used at Home none    Concerns to be Addressed discharge planning                   Discharge Plan       Row Name 10/24/23 0932       Plan    Plan Return Home    Plan Comments Spoke with patient and spouse at bedside, states IADL's, Confirmed PCP and Pharmacy,Denies transportation or medication coverage issues. DC Barrier: Plan EGD                  Continued Care and Services - Admitted Since 10/23/2023    Coordination has not been started for this encounter.       Expected Discharge Date and Time       Expected Discharge Date Expected Discharge Time    Oct 25, 2023            Demographic Summary       Row Name 10/24/23 0911       General Information    Admission Type observation    Arrived From home     Referral Source patient;family    Reason for Consult discharge planning    Preferred Language English                   Functional Status       Row Name 10/24/23 5385       Functional Status    Usual Activity Tolerance good    Current Activity Tolerance good       Functional Status, IADL    Medications independent    Meal Preparation independent    Housekeeping independent    Laundry independent    Shopping assistive person       Mental Status    General Appearance WDL WDL           Met with patient in room wearing PPE: mask, face shield/goggles, gloves, gown.      Maintained distance greater than six feet and spent less than 15 minutes in the room.     Rachell Pulido RN

## 2023-10-25 ENCOUNTER — APPOINTMENT (OUTPATIENT)
Dept: CT IMAGING | Facility: HOSPITAL | Age: 58
End: 2023-10-25
Payer: COMMERCIAL

## 2023-10-25 ENCOUNTER — INPATIENT HOSPITAL (OUTPATIENT)
Dept: URBAN - METROPOLITAN AREA HOSPITAL 84 | Facility: HOSPITAL | Age: 58
End: 2023-10-25
Payer: COMMERCIAL

## 2023-10-25 ENCOUNTER — READMISSION MANAGEMENT (OUTPATIENT)
Dept: CALL CENTER | Facility: HOSPITAL | Age: 58
End: 2023-10-25
Payer: COMMERCIAL

## 2023-10-25 VITALS
WEIGHT: 129 LBS | TEMPERATURE: 97.7 F | HEIGHT: 68 IN | OXYGEN SATURATION: 100 % | SYSTOLIC BLOOD PRESSURE: 120 MMHG | DIASTOLIC BLOOD PRESSURE: 98 MMHG | BODY MASS INDEX: 19.55 KG/M2 | HEART RATE: 56 BPM | RESPIRATION RATE: 19 BRPM

## 2023-10-25 DIAGNOSIS — R10.13 EPIGASTRIC PAIN: ICD-10-CM

## 2023-10-25 LAB
LAB AP CASE REPORT: NORMAL
LAB AP CLINICAL INFORMATION: NORMAL
PATH REPORT.FINAL DX SPEC: NORMAL
PATH REPORT.GROSS SPEC: NORMAL

## 2023-10-25 PROCEDURE — 94799 UNLISTED PULMONARY SVC/PX: CPT

## 2023-10-25 PROCEDURE — 96376 TX/PRO/DX INJ SAME DRUG ADON: CPT

## 2023-10-25 PROCEDURE — 96361 HYDRATE IV INFUSION ADD-ON: CPT

## 2023-10-25 PROCEDURE — 25010000002 METHYLPREDNISOLONE PER 40 MG

## 2023-10-25 PROCEDURE — G0378 HOSPITAL OBSERVATION PER HR: HCPCS

## 2023-10-25 PROCEDURE — 96375 TX/PRO/DX INJ NEW DRUG ADDON: CPT

## 2023-10-25 PROCEDURE — 74177 CT ABD & PELVIS W/CONTRAST: CPT

## 2023-10-25 PROCEDURE — 25510000001 IOPAMIDOL PER 1 ML: Performed by: INTERNAL MEDICINE

## 2023-10-25 PROCEDURE — 99232 SBSQ HOSP IP/OBS MODERATE 35: CPT | Performed by: NURSE PRACTITIONER

## 2023-10-25 PROCEDURE — 94761 N-INVAS EAR/PLS OXIMETRY MLT: CPT

## 2023-10-25 PROCEDURE — 25010000002 DIPHENHYDRAMINE PER 50 MG

## 2023-10-25 PROCEDURE — 25810000003 LACTATED RINGERS PER 1000 ML: Performed by: INTERNAL MEDICINE

## 2023-10-25 PROCEDURE — 63710000001 ONDANSETRON PER 8 MG: Performed by: INTERNAL MEDICINE

## 2023-10-25 RX ORDER — METHYLPREDNISOLONE SODIUM SUCCINATE 40 MG/ML
40 INJECTION, POWDER, LYOPHILIZED, FOR SOLUTION INTRAMUSCULAR; INTRAVENOUS EVERY 4 HOURS
Qty: 6 ML | Refills: 0 | Status: DISCONTINUED | OUTPATIENT
Start: 2023-10-25 | End: 2023-10-25

## 2023-10-25 RX ORDER — DIPHENHYDRAMINE HYDROCHLORIDE 50 MG/ML
50 INJECTION INTRAMUSCULAR; INTRAVENOUS
Status: COMPLETED | OUTPATIENT
Start: 2023-10-25 | End: 2023-10-25

## 2023-10-25 RX ORDER — ONDANSETRON 4 MG/1
4 TABLET, FILM COATED ORAL EVERY 6 HOURS PRN
Qty: 90 TABLET | Refills: 0 | Status: SHIPPED | OUTPATIENT
Start: 2023-10-25

## 2023-10-25 RX ORDER — LACOSAMIDE 150 MG/1
150 TABLET ORAL EVERY 12 HOURS SCHEDULED
Qty: 60 TABLET | Refills: 3 | Status: SHIPPED | OUTPATIENT
Start: 2023-10-25

## 2023-10-25 RX ORDER — TOPIRAMATE 50 MG/1
TABLET, FILM COATED ORAL
Qty: 40 TABLET | Refills: 0 | Status: SHIPPED | OUTPATIENT
Start: 2023-10-25 | End: 2023-11-02

## 2023-10-25 RX ORDER — SUCRALFATE 1 G/1
1 TABLET ORAL 4 TIMES DAILY
Qty: 120 TABLET | Refills: 0 | Status: SHIPPED | OUTPATIENT
Start: 2023-10-25

## 2023-10-25 RX ORDER — PANTOPRAZOLE SODIUM 40 MG/1
40 TABLET, DELAYED RELEASE ORAL
Qty: 60 TABLET | Refills: 1 | Status: SHIPPED | OUTPATIENT
Start: 2023-10-25

## 2023-10-25 RX ADMIN — DILTIAZEM HYDROCHLORIDE 240 MG: 240 CAPSULE, COATED, EXTENDED RELEASE ORAL at 08:10

## 2023-10-25 RX ADMIN — SODIUM CHLORIDE, POTASSIUM CHLORIDE, SODIUM LACTATE AND CALCIUM CHLORIDE 75 ML/HR: 600; 310; 30; 20 INJECTION, SOLUTION INTRAVENOUS at 01:45

## 2023-10-25 RX ADMIN — HYDROXYCHLOROQUINE SULFATE 200 MG: 200 TABLET ORAL at 08:10

## 2023-10-25 RX ADMIN — SUCRALFATE 1 G: 1 TABLET ORAL at 08:10

## 2023-10-25 RX ADMIN — DIPHENHYDRAMINE HYDROCHLORIDE 50 MG: 50 INJECTION, SOLUTION INTRAMUSCULAR; INTRAVENOUS at 00:34

## 2023-10-25 RX ADMIN — METHYLPREDNISOLONE SODIUM SUCCINATE 40 MG: 40 INJECTION, POWDER, FOR SOLUTION INTRAMUSCULAR; INTRAVENOUS at 00:34

## 2023-10-25 RX ADMIN — SUMATRIPTAN SUCCINATE 50 MG: 50 TABLET ORAL at 01:49

## 2023-10-25 RX ADMIN — IOPAMIDOL 80 ML: 755 INJECTION, SOLUTION INTRAVENOUS at 01:50

## 2023-10-25 RX ADMIN — PANTOPRAZOLE SODIUM 40 MG: 40 TABLET, DELAYED RELEASE ORAL at 08:10

## 2023-10-25 RX ADMIN — LEVOTHYROXINE SODIUM 75 MCG: 0.07 TABLET ORAL at 05:52

## 2023-10-25 RX ADMIN — SERTRALINE 50 MG: 50 TABLET, FILM COATED ORAL at 08:10

## 2023-10-25 RX ADMIN — Medication 10 ML: at 08:11

## 2023-10-25 RX ADMIN — LACOSAMIDE 150 MG: 50 TABLET, FILM COATED ORAL at 08:10

## 2023-10-25 RX ADMIN — TOPIRAMATE 200 MG: 100 TABLET, FILM COATED ORAL at 08:10

## 2023-10-25 RX ADMIN — ONDANSETRON HYDROCHLORIDE 4 MG: 4 TABLET, FILM COATED ORAL at 08:10

## 2023-10-25 NOTE — PLAN OF CARE
Goal Outcome Evaluation:  Plan of Care Reviewed With: patient        Progress: no change  Outcome Evaluation: Pt with complaints of pain this shift- see MAR. Imitrex ordered for migraine per pt request. Neurology consulted, medications adjusted this evening. CT abd/pelvis ordered- d/t pt contrast allergy benedryl and solu-medrol ordered to pre-medicate. on clear liquid diet. Will monitor.

## 2023-10-25 NOTE — DISCHARGE SUMMARY
[facility] Medicine Services  DISCHARGE SUMMARY    Patient Name: Tasneem Valentino  : 1965  MRN: [id number]    Date of Admission: 10/23/2023  Discharge Diagnosis: Upper Abdomin Pain   Date of Discharge:  10/25/23  Primary Care Physician: Piper Levine APRN      Presenting Problem:   Confusion [R41.0]  Abdominal pain [R10.9]  Abdominal pain, unspecified abdominal location [R10.9]    Active and Resolved Hospital Problems:  Active Hospital Problems    Diagnosis POA    **Abdominal pain [R10.9] Yes      Resolved Hospital Problems   No resolved problems to display.     Upper Abdomin Pain   Weight loss  -CT Abdomen w/o contrast Noncalcified nodules at the lung bases similar to prior CT chest. Please see CT chest comparison for further recommendations. Nonobstructing renal calculi bilaterally. Dilation of the common bile duct compatible with postcholecystectomy ectasia. Please correlate with liver function tests and bilirubin levels if clinically indicated   -Lipase 18, AST 13 ALT 7 total bilirubin 0.6.  -Continue Protonix and Zofran and Carafate  -Hemoglobin 11.8 (12.1)  -GI panel, stool study  -GI consulted for EGD  -Check [id number]     Seizure disorder/epilepsy  -Patient currently on Vimpat, questionable abdominal pain secondary to multiple seizure medication changes.  Patient has been on Vimpat for approximately 2 months that symptoms began.  -Neurology consultation  -Seizure precautions     COPD not in exacerbation  -Continue home inhalers     Lupus-Sjogren's disease  -Continue Plaquenil     Hypothyroidism  -Continue home levothyroxine     Hypertension  -Continue diltiazem     Lung nodules  -Continue follow-up in outpatient clinic with PCP for CT, noncalcified nodules also seen on prior studies.  (Images 2022.  2023)  -Patient is followed by Dr. Oksana gordon- D5 w added.     Hospital Course       History of Present Illness: Tasneem Valentino is a 58 y.o.  female with a past medical history to include lupus, hypothyroidism, aortic regurgitation, seizure disorder who presented to [facility] on 10/23/2023 complaining of upper abdominal pain.  Patient reports that she was diagnosed with seizure disorder in November 2022 and has been placed on antiseizure medications.  She states that these medications have been changed multiple times due to side effects such as abdominal pain.  She reports that she has lost 40 pounds since November 2022, this as a known side effect also of the medications.  Reports that she was placed on Vimpat approximately 2 months ago.  She states that over the last 2 months she has had upper abdominal pain, nausea and diarrhea.  She states the symptoms increased since Friday.  She reports that she came to the emergency department on Saturday and was given Carafate, Zofran and PPI with follow-up to PCP.  She reports that she went to PCP today who recommended GI appointment but felt the patient was in too much distress and advised her to go to the emergency department.  Patient denies any lightheadedness or syncopal episodes.  She reports nausea without vomiting.  She denies having any chest pains or shortness of air.  She reports epigastric pain and tenderness upon palpation.  She denies having any melena, hematochezia or any hematemesis.  She endorses diarrhea that has worsened over the last few days.  She denies having any urinary symptoms.  She denies having any lower extremity edema.  Of note, patient reports history of cholecystectomy in 2010 with stent placed by Dr. Donato at that time.  She also reports history of cardiology follow-up for aortic regurg which she states is much better.  She also reports being followed by Dr. Gandhi for pulmonary nodules seen on CT and is followed annually.  She states Dr. Gandhi ordered DREW to be performed by her cardiologist in [facility] on November 6, 2023.  We been asked to admit this patient for  further evaluation and treatment        10/24/23 c/o abdominal pain and nausea, for EGD today, RAD RN, VSS, no rectal bleeding  10/25/2023 patient seen and examined in bed no acute distress, vital signs stable, doing better today, will discharge patient home, condition at discharge stable, discussed with neurology.  To taper Topamax.  And increased Vimpat.    DISCHARGE Follow Up Recommendations for labs and diagnostics: follow with pcp in one week      Reasons For Change In Medications and Indications for New Medications:      Day of Discharge     Vital Signs:  Temp:  [97.7 °F (36.5 °C)-98 °F (36.7 °C)] 97.7 °F (36.5 °C)  Heart Rate:  [52-80] 56  Resp:  [16-19] 19  BP: (103-153)/(52-98) 120/98    Physical Exam General: She is not in acute distress.     Appearance: She is ill-appearing.   HENT:      Head: Normocephalic and atraumatic.   Cardiovascular:      Rate and Rhythm: Normal rate and regular rhythm.      Pulses: Normal pulses.      Heart sounds: No murmur heard.  Pulmonary:      Effort: Pulmonary effort is normal. No respiratory distress.      Breath sounds: Normal breath sounds.   Abdominal:      Palpations: Abdomen is soft.      Tenderness: There is abdominal tenderness.   Musculoskeletal:         General: Normal range of motion.      Cervical back: Normal range of motion and neck supple.      Right lower leg: No edema.      Left lower leg: No edema.   Skin:     General: Skin is warm and dry.   Neurological:      General: No focal deficit present.      Mental Status: She is alert and oriented to person, place, and time.   Psychiatric:         Mood and Affect: Mood normal.         Behavior: Behavior normal.          Pertinent  and/or Most Recent Results     LAB RESULTS:      Lab 10/24/23  0511 10/23/23  1429 10/21/23  1821   WBC 4.40 4.20 5.40   HEMOGLOBIN 11.0* 11.8* 12.1   HEMATOCRIT 35.4 36.6 37.8   PLATELETS 164 163 177   NEUTROS ABS 2.90 3.50 4.00   LYMPHS ABS 0.90 0.50* 0.70   MONOS ABS 0.40 0.30 0.50    EOS ABS 0.10 0.00 0.10   MCV 82.3 80.2 80.7   PROTIME  --  12.4*  --    APTT  --  25.7*  --          Lab 10/24/23  0511 10/23/23  1429 10/21/23  1821   SODIUM 138 141 141   POTASSIUM 4.7 3.6 3.7   CHLORIDE 107 107 108*   CO2 17.0* 20.0* 19.0*   ANION GAP 14.0 14.0 14.0   BUN 20 15 14   CREATININE 0.90 0.90 1.05*   EGFR 74.7 74.7 62.1   GLUCOSE 53* 91 104*   CALCIUM 9.8 9.8 9.8         Lab 10/23/23  1429 10/21/23  1821   TOTAL PROTEIN 6.9 7.1   ALBUMIN 4.4 4.5   GLOBULIN 2.5 2.6   ALT (SGPT) 7 8   AST (SGOT) 13 14   BILIRUBIN 0.2 0.2   ALK PHOS 112 118*   LIPASE 18 18         Lab 10/23/23  2157 10/23/23  1949 10/23/23  1429   HSTROP T 28* 26*  --    PROTIME  --   --  12.4*   INR  --   --  1.15*                 Brief Urine Lab Results  (Last result in the past 365 days)        Color   Clarity   Blood   Leuk Est   Nitrite   Protein   CREAT   Urine HCG        10/23/23 1543 Yellow   Clear   Negative   Small (1+)   Negative   30 mg/dL (1+)                 Microbiology Results (last 10 days)       ** No results found for the last 240 hours. **            CT Abdomen Pelvis With Contrast    Result Date: 10/25/2023  Impression: Impression: No acute abdominal or pelvic abnormality. Electronically Signed: Rohith Vu MD  10/25/2023 2:14 AM EDT  Workstation ID: [id number]    MRI Angiogram Head Without Contrast    Result Date: 10/24/2023  Impression: Impression: Normal study. Electronically Signed: Domingo Garay MD  10/24/2023 7:08 PM EDT  Workstation ID: [id number]    MRI Brain With & Without Contrast    Result Date: 10/24/2023  Impression: Impression: Normal study. Electronically Signed: Domingo Garay MD  10/24/2023 6:51 PM EDT  Workstation ID: [id number] [facility]hance    CT Abdomen Pelvis Without Contrast    Result Date: 10/23/2023  Impression: 1. Noncalcified nodules at the lung bases similar to prior CT chest. Please see CT chest comparison for further recommendations 2. Nonobstructing renal calculi bilaterally 3. Dilation of the common  bile duct compatible with postcholecystectomy ectasia. Please correlate with liver function tests and bilirubin levels if clinically indicated 4. Other incidental findings as above Electronically Signed: David Guerra MD  10/23/2023 3:14 PM EDT  Workstation ID: [id number]    CT Head Without Contrast    Result Date: 10/23/2023  Impression: Impression: No significant findings. Electronically Signed: Shanta Alegre MD  10/23/2023 3:00 PM EDT  Workstation ID: [id number]             Results for orders placed during the hospital encounter of 10/19/22    Adult Transthoracic Echo Complete W/ Cont if Necessary Per Protocol    Interpretation Summary    Estimated left ventricular EF was in agreement with the calculated left ventricular EF. Left ventricular ejection fraction appears to be 61 - 65%. Left ventricular systolic function is normal.    Left ventricular diastolic function was normal.    The right ventricular cavity is borderline dilated.    Estimated right ventricular systolic pressure from tricuspid regurgitation is normal (<35 mmHg).      Labs Pending at Discharge:      Procedures Performed  Procedure(s):  ESOPHAGOGASTRODUODENOSCOPY WITH BIOPSY X1 AREA  10/24 1321 UPPER GI ENDOSCOPY      Consults:   Consults       Date and Time Order Name Status Description    10/23/2023  6:59 PM Inpatient Gastroenterology Consult Completed     10/23/2023  6:59 PM Inpatient Neurology Consult General Completed     10/23/2023  3:35 PM Hospitalist (on-call MD unless specified)      10/23/2023  3:34 PM Hospitalist (on-call MD unless specified)          Assessment & Plan  58-year-old female presented to the hospital with persistent upper abdominal pain.  Originally related this to any medication but has persisted.  She has also lost weight.     Upper abdominal pain  Nausea  Weight loss  Mild normocytic anemia  Seizures  COPD/lung nodules  Sjogren's disease  Lupus  History of cholecystectomy        Plan:  Status post EGD 10/24/2023 by   Samson with nonerosive gastritis.     She reports feeling better today.  Less abdominal pain.  No nausea.  Is not requiring pain medication.  Was able to tolerate liquid diet.  Contrasted CT was performed last night and was unremarkable of the GI tract.  Consider symptoms related to recent medications and these are being adjusted.  Advance to soft diet.  Continue PPI.  Patient can be discharged home from GI standpoint.        DAVI Parada  10/25/23  10:41 EDT     Attestation signed by Wesley Davies MD at 10/25/23 [id number]     I have reviewed this documentation and agree.  58-year-old woman with the acute onset of epigastric pain.  She is feeling somewhat better today.  EGD showed diffuse nonerosive gastritis.  Biopsies did not show evidence of H. pylori or any premalignant lesions.  CT scan was performed and revealed no acute abnormalities.  On exam her abdomen is nondistended with good bowel sounds and she is nontender today.  White blood count 4.4.  Hemoglobin 9.0.  She was evaluated by neurology and advised that perhaps her pain is related to medications, most likely topiramate.  I am recommending that she continue a proton pump inhibitor daily.  Regular diet as tolerated.  Okay for discharge home when otherwise medically stable.  She can follow-up in my office if needed.  I saw the patient today with Kimber Berry NP.  I have performed a complete history and physical examination and reviewed appropriate laboratory studies and radiographic exams.  I have completed the majority and substantive portion of the history, physical examination.  I completed the majority and substantive portion of the medical decision making.    Wesley Davies M.D.         ASSESSMENT/PLAN:  1. Pt presents with a complicated medical history including a long history of frequent migraines and new diagnosis of seizures last year with no known cause. She has been on Topamax for many years for migraines and it was increased  to 250mg BID when she had a seizure last November. The usual max dose of Topamax is 400mg/day and increased side effects can be noted with the higher end of doses. Pt's c/o GI discomfort is a rather common complaint of Topamax. She also complains of memory/cognitive issues, word finding problems, fatigue, weight loss, and appetite changes that have affected her significantly. These have all been reported as fairly common side effects of Topamax. Most notably, pt has a hx of renal stones and currently has stones on her CT. Topamax can increase the risk of stones by 2-4 times. Her seizures episodes have decreased after starting Vimpat which is generally well tolerated.   - We had a very detailed discussion regarding treatments/medications and risks/benefits/side effects. Pt was not aware of many side effects associated with Topamax.   - It is strongly recommended to wean off Topamax due to probable association with at least several of pt's complaints, but also due to the increased association with renal stones. Plan to decrease dose by 50mg (am and pm) every 2 days until off.   - Plan to increase Vimpat to 150mg BID  - Recommend outpt follow up with epileptologist due to persistence and unusual nature of her spells.   - We will obtain and MRI brain w/wo contrast while she is here as she does not believe she has had a scan with contrast previously and no cause of seizures has been identified.   - Monitor for seizures. There is an increased risk of breakthrough seizures any time antiepileptics are adjusted. Seizure precautions.   - Pt is following pain management for migraines      Will follow      I discussed the patient's findings and my recommendations with patient, family, nursing staff, and consulting provider     DAVI Perez  10/24/23  23:04 EDT    ASSESSMENT/PLAN:     Hx of seizures and migraines. Patient likely having ongoing side effects of Topamax (renal stones, abdominal discomfort, speech  problems, fatigue, weight loss, appetite changes)   - MRI brain w/wo contrast read negative per report   - It is strongly recommended to wean off Topamax due to probable association with at least several of pt's complaints, but also due to the increased association with renal stones. Plan to decrease dose by 50mg (am and pm) every 2 days until off.   - Plan to increase Vimpat to 150mg BID  - Recommend outpt follow up with epileptologist due to persistence and unusual nature of her spells.   - Monitor for seizures. There is an increased risk of breakthrough seizures any time antiepileptics are adjusted. Seizure precautions.   - Pt is following pain management for migraines            **Please refer to previous notes for further details and recommendations.         Eva Montalvo, APRN  Discharge Details        Discharge Medications        Continue These Medications        Instructions Start Date   albuterol sulfate  (90 Base) MCG/ACT inhaler  Commonly known as: PROVENTIL HFA;VENTOLIN HFA;PROAIR HFA   2 puffs, Every 4 Hours PRN, for wheezing      Breo Ellipta 100-25 MCG/ACT aerosol powder   Generic drug: Fluticasone Furoate-Vilanterol   1 puff, Inhalation, Daily      cetirizine 10 MG tablet  Commonly known as: zyrTEC   10 mg, Oral, Daily PRN      dilTIAZem  MG 24 hr capsule  Commonly known as: CARDIZEM CD   1 capsule, Oral, Daily      fluticasone 50 MCG/ACT nasal spray  Commonly known as: FLONASE   2 sprays, Nasal, As Needed      HYDROcodone-acetaminophen  MG per tablet  Commonly known as: Norco   1 tablet, Oral, Every 6 Hours PRN      hydroxychloroquine 200 MG tablet  Commonly known as: PLAQUENIL   200 mg, Oral, 2 Times Daily      lacosamide 50 MG tablet tablet  Commonly known as: VIMPAT   100 mg, Oral, Every 12 Hours Scheduled      levothyroxine 75 MCG tablet  Commonly known as: SYNTHROID, LEVOTHROID   75 mcg, Oral, Daily      meclizine 25 MG tablet  Commonly known as: ANTIVERT   25 mg, Oral,  3 Times Daily PRN      nitroglycerin 0.4 MG SL tablet  Commonly known as: NITROSTAT   0.4 mg, Sublingual, Every 5 Minutes PRN, Take no more than 3 doses in 15 minutes.      omeprazole 20 MG capsule  Commonly known as: priLOSEC   20 mg, Oral, Daily      ondansetron 4 MG tablet  Commonly known as: ZOFRAN   4 mg, Oral, Every 8 Hours PRN      ondansetron ODT 4 MG disintegrating tablet  Commonly known as: ZOFRAN-ODT   4 mg, Translingual, Every 8 Hours PRN      pantoprazole 40 MG EC tablet  Commonly known as: PROTONIX   40 mg, Oral, Daily      sertraline 50 MG tablet  Commonly known as: Zoloft   50 mg, Oral, Daily      sucralfate 1 g tablet  Commonly known as: CARAFATE   1 g, Oral, 4 Times Daily      SUMAtriptan 100 MG tablet  Commonly known as: IMITREX   100 mg, Oral, Once As Needed, Take one tablet at onset of headache. May repeat dose one time in 2 hours if headache not relieved.             ASK your doctor about these medications        Instructions Start Date   topiramate 100 MG tablet  Commonly known as: TOPAMAX  Ask about: Which instructions should I use?   250 mg, Oral, 2 Times Daily               Allergies   Allergen Reactions    Contrast Dye (Echo Or Unknown Ct/Mr) Itching and Shortness Of Breath    Sulfa Antibiotics Swelling    Tizanidine Mental Status Change and Hallucinations    Iodinated Contrast Media Itching    Breztri Aerosphere [Budeson-Glycopyrrol-Formoterol] Photosensitivity     Double vision    Oxcarbazepine Confusion         Discharge Disposition:       Diet:  Hospital:  Diet Order   Procedures    Diet: Regular/House Diet; Texture: Soft to Chew (NDD 3); Soft to Chew: Whole Meat; Fluid Consistency: Thin (IDDSI 0)         Discharge Activity:         CODE STATUS:  Code Status and Medical Interventions:   Ordered at: 10/23/23 1821     Code Status (Patient has no pulse and is not breathing):    CPR (Attempt to Resuscitate)     Medical Interventions (Patient has pulse or is breathing):    Full Support      I have utilized all available, immediate resources to obtain, update, or review the patient's current medications including all prescriptions, over-the-counter products, herbals, cannabis/cannabidiol products, and vitamin.mineral/dietary (nutritional) supplements.      Code Status (Patient has no pulse and is not breathing): CPR (Attempt to Resuscitate)  Medical Interventions (Patient has pulse or is breathing): Full Support    Next of kin of Power of :       Admission Status:  I believe this patient meets admit status.      Future Appointments   Date Time Provider Department Center   11/27/2023  8:30 AM Piper Levine APRN [facility]   1/25/2024  8:50 AM Abdirahman Epps MD [facility]           Time spent on Discharge including face to face service:  38 minutes      Signature: Electronically signed by Kamlesh Santamaria MD, 10/25/23, 11:05 AM EDT.

## 2023-10-25 NOTE — CONSULTS
Primary Care Provider: Piper Levine AP*     Consult requested by:  Andie Echols PA-C    Reason for Consultation: Neurological evaluation     History taken from: patient chart family RN    Chief complaint: Headaches, seizures, medication side effects       SUBJECTIVE:    History of present illness: Background per H&P: Tasneem Valentino is a 58 y.o. year old female with a past medical history to include lupus, hypothyroidism, aortic regurgitation, seizure disorder who presented to [facility] on 10/23/2023 complaining of upper abdominal pain.  Patient reports that she was diagnosed with seizure disorder in November 2022 and has been placed on antiseizure medications.  She states that these medications have been changed multiple times due to side effects such as abdominal pain.  She reports that she has lost 40 pounds since November 2022, this as a known side effect also of the medications.  Reports that she was placed on Vimpat approximately 2 months ago.  She states that over the last 2 months she has had upper abdominal pain, nausea and diarrhea.  She states the symptoms increased since Friday.  She reports that she came to the emergency department on Saturday and was given Carafate, Zofran and PPI with follow-up to PCP.  She reports that she went to PCP today who recommended GI appointment but felt the patient was in too much distress and advised her to go to the emergency department.  Patient denies any lightheadedness or syncopal episodes.  She reports nausea without vomiting.  She denies having any chest pains or shortness of air.  She reports epigastric pain and tenderness upon palpation.  She denies having any melena, hematochezia or any hematemesis.  She endorses diarrhea that has worsened over the last few days.  She denies having any urinary symptoms.  She denies having any lower extremity edema.  Of note, patient reports history of cholecystectomy in 2010 with stent placed by Dr. Donato at  that time.  She also reports history of cardiology follow-up for aortic regurg which she states is much better.  She also reports being followed by Dr. Gandhi for pulmonary nodules seen on CT and is followed annually.  She states Dr. Gandhi ordered DREW to be performed by her cardiologist in [facility] on November 6, 2023.      - Portions of the above HPI were copied from previous encounters and edited as appropriate. PMH as detailed below.     As detailed above, ousmane has had an extensive PMH. Most notably, she has had migraines for at least the past 20 years. She cannot recall a specific incident or cause. She has been previously diagnosed with Lyme disease (treated) and Lupus, but states she had migraines long before that. She has been on Topamax for many years for migraines. When she had a seizure last November, she was seen by Dr. Santiago who increased the Topamax from 200mg BID to 250mg BID. She has remained on this dose. She was also tried on many other seizure medications due to persistent episodes, but seemed to have side effects with each medication, primarily gastric and cognitive.     Pt has had an abnormal EEG showing bi-temporal discharges. It seems she may have a combination of spells, some being complex-partial seizures and other times, possible atypical migraines with aura. Nonetheless, she has tried many medications and treatments with no improvement in her migraines. She has started seeing pain management and states she was started on hydrocodone, but states it only helps a little when she has a migraine, which is multiple times a week. She has lost a significant amount of weight. Since starting Vimpat, she states the seizures have decreased, but she is concerned it is causing her cognitive issues and GI pain. It is noted that pt has a hx of renal stones and has current stones per CT, but states she has never been told that Topamax could be contributing. At this time, she denies any acute or new  complaints, but states she is tired of feeling this way all the time and wants answers.     Review of Systems   Constitutional:  Positive for appetite change, fatigue and unexpected weight change. Negative for chills and diaphoresis.   Eyes:  Negative for photophobia and visual disturbance.   Gastrointestinal:  Positive for abdominal pain and nausea.   Neurological:  Positive for headaches. Negative for dizziness, tremors, seizures, syncope, facial asymmetry, speech difficulty, weakness, light-headedness and numbness.          PATIENT HISTORY:  Past Medical History:   Diagnosis Date    Acute non-recurrent maxillary sinusitis 05/11/2022    Allergic     Anemia     Asthma     COVID-19 07/28/2021    COVID-19 05/21/2022    Epilepsy     Headache     Heart disease     HL (hearing loss)     Hypertension     Hypothyroidism     Kidney disease     Lupus     Migraine     Renal insufficiency     Seizures     Sjogren's disease     Solitary lung nodule 09/27/2022    Thyroid disease     Tremor     Upper respiratory tract infection 11/08/2021   ,   Past Surgical History:   Procedure Laterality Date    APPENDECTOMY      BRAIN SURGERY      occipital Neurotomy    CARDIAC CATHETERIZATION      CHOLECYSTECTOMY      OCCIPITAL NEURECTOMY  2010    OVARY SURGERY      SINUS SURGERY     ,   Family History   Problem Relation Age of Onset    Hypertension Mother     Cancer Father     Arthritis Sister     Heart disease Brother     No Known Problems Brother     No Known Problems Brother    ,   Social History     Tobacco Use    Smoking status: Never     Passive exposure: Never    Smokeless tobacco: Never   Vaping Use    Vaping Use: Never used   Substance Use Topics    Alcohol use: No    Drug use: Never   ,   Prior to Admission medications    Medication Sig Start Date End Date Taking? Authorizing Provider   Christ Ellipta 100-25 MCG/ACT aerosol powder  Inhale 1 puff Daily. 9/28/23  Yes Provider, MD Sameer   dilTIAZem CD (CARDIZEM CD) 240 MG 24 hr  capsule Take 1 capsule by mouth Daily. 4/20/23  Yes Sameer Jon MD   HYDROcodone-acetaminophen (Norco)  MG per tablet Take 1 tablet by mouth Every 6 (Six) Hours As Needed for Moderate Pain. 10/19/23  Yes Abdirahman Epps MD   hydroxychloroquine (PLAQUENIL) 200 MG tablet Take 1 tablet by mouth 2 (Two) Times a Day. 6/1/12  Yes Sameer Jon MD   lacosamide (VIMPAT) 50 MG tablet tablet Take 2 tablets by mouth Every 12 (Twelve) Hours. 8/30/23  Yes Sameer Jon MD   levothyroxine (SYNTHROID, LEVOTHROID) 75 MCG tablet Take 1 tablet by mouth Daily. 1/26/23  Yes Piper Levine APRN   omeprazole (priLOSEC) 20 MG capsule TAKE 1 CAPSULE BY MOUTH DAILY 4/7/23  Yes Piper Levine APRN   pantoprazole (PROTONIX) 40 MG EC tablet Take 1 tablet by mouth Daily. 10/21/23  Yes Rohith Dove MD   sertraline (Zoloft) 50 MG tablet Take 1 tablet by mouth Daily. 10/16/23  Yes Piper Levine APRN   sucralfate (CARAFATE) 1 g tablet Take 1 tablet by mouth 4 (Four) Times a Day. 10/21/23  Yes Rohith Dove MD   topiramate (TOPAMAX) 100 MG tablet Take 2.5 tablets by mouth 2 (Two) Times a Day.   Yes Sameer Jon MD   albuterol sulfate  (90 Base) MCG/ACT inhaler INHALE 2 PUFFS EVERY 4 HOURS AS NEEDED FOR WHEEZING 10/16/23   Piper Levine APRN   cetirizine (zyrTEC) 10 MG tablet Take 1 tablet by mouth Daily As Needed. 1/1/16   Sameer Jon MD   fluticasone (FLONASE) 50 MCG/ACT nasal spray 2 sprays into the nostril(s) as directed by provider As Needed for Rhinitis.    Sameer Jon MD   meclizine (ANTIVERT) 25 MG tablet Take 1 tablet by mouth 3 (Three) Times a Day As Needed.    Sameer Jon MD   nitroglycerin (NITROSTAT) 0.4 MG SL tablet Place 1 tablet under the tongue Every 5 (Five) Minutes As Needed for Chest Pain. Take no more than 3 doses in 15 minutes. 10/28/22   Piper Levine APRN   ondansetron (ZOFRAN) 4 MG tablet Take 1 tablet by mouth  Every 8 (Eight) Hours As Needed for Nausea or Vomiting. 4/3/23   Abdirahman Epps MD   ondansetron ODT (ZOFRAN-ODT) 4 MG disintegrating tablet Place 1 tablet on the tongue Every 8 (Eight) Hours As Needed for Nausea. 10/21/23   Rohith Dove MD   SUMAtriptan (IMITREX) 100 MG tablet Take 1 tablet by mouth 1 (One) Time As Needed for Migraine. Take one tablet at onset of headache. May repeat dose one time in 2 hours if headache not relieved. 10/19/23   Abdirahman Epps MD    Allergies:  Contrast dye (echo or unknown ct/mr), Sulfa antibiotics, Tizanidine, Iodinated contrast media, Breztri aerosphere [budeson-glycopyrrol-formoterol], and Oxcarbazepine    Current Facility-Administered Medications   Medication Dose Route Frequency Provider Last Rate Last Admin    acetaminophen (TYLENOL) tablet 650 mg  650 mg Oral Q4H PRN Wesley Davies MD        Or    acetaminophen (TYLENOL) 160 MG/5ML oral solution 650 mg  650 mg Oral Q4H PRN Wesley Davies MD        Or    acetaminophen (TYLENOL) suppository 650 mg  650 mg Rectal Q4H PRN Wesley Davies MD        albuterol (ACCUNEB) nebulizer solution 0.63 mg  0.63 mg Nebulization Q4H - RT Wesley Davies MD   0.63 mg at 10/23/23 2005    aluminum-magnesium hydroxide-simethicone (MAALOX MAX) 400-400-40 MG/5ML suspension 15 mL  15 mL Oral Q6H PRN Wesley Davies MD        polyethylene glycol (MIRALAX) packet 17 g  17 g Oral Daily PRN Wesley Davies MD        And    bisacodyl (DULCOLAX) EC tablet 5 mg  5 mg Oral Daily PRN Wesley Davies MD        And    bisacodyl (DULCOLAX) suppository 10 mg  10 mg Rectal Daily PRN Wesley Davies MD        budesonide-formoterol (SYMBICORT) 160-4.5 MCG/ACT inhaler 1 puff  1 puff Inhalation BID - RT Wesley Davies MD   1 puff at 10/23/23 2010    Calcium Replacement - Follow Nurse / BPA Driven Protocol   Does not apply PRN Wesley Davies MD        cetirizine (zyrTEC) tablet 10  mg  10 mg Oral Daily PRN Wesley Davies MD        dextrose (D50W) (25 g/50 mL) IV injection 50 mL  50 mL Intravenous Q1H PRN Wesley Davies MD   50 mL at 10/24/23 0604    dextrose (D5W) 5 % infusion  50 mL/hr Intravenous Continuous Wesley Davies MD        dilTIAZem CD (CARDIZEM CD) 24 hr capsule 240 mg  240 mg Oral Daily Wesley Davies MD   240 mg at 10/23/23 2024    HYDROcodone-acetaminophen (NORCO)  MG per tablet 1 tablet  1 tablet Oral Q6H PRN Wesley Davies MD        hydroxychloroquine (PLAQUENIL) tablet 200 mg  200 mg Oral BID Wesley Davies MD   200 mg at 10/24/23 2114    lacosamide (VIMPAT) tablet 50 mg  50 mg Oral Q12H Wesley Davies MD   50 mg at 10/24/23 2114    lactated ringers infusion  75 mL/hr Intravenous Continuous Wesley Davies MD 75 mL/hr at 10/24/23 0941 75 mL/hr at 10/24/23 0941    levothyroxine (SYNTHROID, LEVOTHROID) tablet 75 mcg  75 mcg Oral Q AM Wesley Davies MD   75 mcg at 10/24/23 0500    Magnesium Standard Dose Replacement - Follow Nurse / BPA Driven Protocol   Does not apply PRN Wesley Davies MD        meclizine (ANTIVERT) tablet 25 mg  25 mg Oral TID PRN Wesley Davies MD   25 mg at 10/23/23 2021    melatonin tablet 5 mg  5 mg Oral Nightly PRN Wesley Davies MD        morphine injection 4 mg  4 mg Intravenous Q3H PRN Wesley Davies MD   4 mg at 10/24/23 0455    ondansetron (ZOFRAN) tablet 4 mg  4 mg Oral Q6H PRN Wesley Davies MD        Or    ondansetron (ZOFRAN) injection 4 mg  4 mg Intravenous Q6H PRN Wesley Davies MD   4 mg at 10/24/23 2135    ondansetron (ZOFRAN) tablet 4 mg  4 mg Oral Q6H PRN Wesley Davies MD        Or    ondansetron (ZOFRAN) injection 4 mg  4 mg Intravenous Q6H PRN Wesley Davies MD        pantoprazole (PROTONIX) EC tablet 40 mg  40 mg Oral BID AC Wesley Davies MD   40 mg at 10/24/23 1633    Phosphorus  Replacement - Follow Nurse / BPA Driven Protocol   Does not apply PRN Wesley Davies MD        Potassium Replacement - Follow Nurse / BPA Driven Protocol   Does not apply PRN Wesley Davies MD        sertraline (ZOLOFT) tablet 50 mg  50 mg Oral Daily Wesley Davies MD        sodium chloride 0.9 % flush 10 mL  10 mL Intravenous PRN Wesley Davies MD        sodium chloride 0.9 % flush 10 mL  10 mL Intravenous Q12H Wesley Davies MD   10 mL at 10/24/23 2033    sodium chloride 0.9 % flush 10 mL  10 mL Intravenous PRN Wesley Davies MD        sodium chloride 0.9 % infusion 40 mL  40 mL Intravenous PRN Wesley Davies MD        sucralfate (CARAFATE) tablet 1 g  1 g Oral 4x Daily Wesley Davies MD   1 g at 10/24/23 2114    SUMAtriptan (IMITREX) tablet 50 mg  50 mg Oral Q2H PRN Camilla Kim APRN   50 mg at 10/24/23 2114    topiramate (TOPAMAX) tablet 250 mg  250 mg Oral BID Wesley Davies MD   250 mg at 10/24/23 0939     Current Outpatient Medications   Medication Sig Dispense Refill    Breo Ellipta 100-25 MCG/ACT aerosol powder  Inhale 1 puff Daily.      dilTIAZem CD (CARDIZEM CD) 240 MG 24 hr capsule Take 1 capsule by mouth Daily.      HYDROcodone-acetaminophen (Norco)  MG per tablet Take 1 tablet by mouth Every 6 (Six) Hours As Needed for Moderate Pain. 120 tablet 0    hydroxychloroquine (PLAQUENIL) 200 MG tablet Take 1 tablet by mouth 2 (Two) Times a Day.      lacosamide (VIMPAT) 50 MG tablet tablet Take 2 tablets by mouth Every 12 (Twelve) Hours.      levothyroxine (SYNTHROID, LEVOTHROID) 75 MCG tablet Take 1 tablet by mouth Daily. 90 tablet 1    omeprazole (priLOSEC) 20 MG capsule TAKE 1 CAPSULE BY MOUTH DAILY 90 capsule 3    pantoprazole (PROTONIX) 40 MG EC tablet Take 1 tablet by mouth Daily. 14 tablet 0    sertraline (Zoloft) 50 MG tablet Take 1 tablet by mouth Daily. 30 tablet 1    sucralfate (CARAFATE) 1 g tablet Take 1 tablet by  mouth 4 (Four) Times a Day. 40 tablet 0    topiramate (TOPAMAX) 100 MG tablet Take 2.5 tablets by mouth 2 (Two) Times a Day.      albuterol sulfate  (90 Base) MCG/ACT inhaler INHALE 2 PUFFS EVERY 4 HOURS AS NEEDED FOR WHEEZING 6.7 g 6    cetirizine (zyrTEC) 10 MG tablet Take 1 tablet by mouth Daily As Needed.      fluticasone (FLONASE) 50 MCG/ACT nasal spray 2 sprays into the nostril(s) as directed by provider As Needed for Rhinitis.      meclizine (ANTIVERT) 25 MG tablet Take 1 tablet by mouth 3 (Three) Times a Day As Needed.      nitroglycerin (NITROSTAT) 0.4 MG SL tablet Place 1 tablet under the tongue Every 5 (Five) Minutes As Needed for Chest Pain. Take no more than 3 doses in 15 minutes. 20 tablet 1    ondansetron (ZOFRAN) 4 MG tablet Take 1 tablet by mouth Every 8 (Eight) Hours As Needed for Nausea or Vomiting. 90 tablet 11    ondansetron ODT (ZOFRAN-ODT) 4 MG disintegrating tablet Place 1 tablet on the tongue Every 8 (Eight) Hours As Needed for Nausea. 12 tablet 0    SUMAtriptan (IMITREX) 100 MG tablet Take 1 tablet by mouth 1 (One) Time As Needed for Migraine. Take one tablet at onset of headache. May repeat dose one time in 2 hours if headache not relieved. 9 tablet 5        ________________________________________________________        OBJECTIVE:  Upon today's exam, pt is awake and alert.  at BS.      Neurologic Exam  PHYSICAL EXAM:    CONSTITUTIONAL: The patient is in no apparent distress, bright awake and alert.  Appears fatigued and generally uncomfortable    HEENT: There is no tenderness over the temporal arteries bilaterally. Normocephalic, atraumatic. TMJ open symmetrically without tenderness.    NECK: ROM normal, supple    RESPIRATORY: Breathing unlabored, Breath sounds are normal    CARDIAC: Regular rate and rhythm.     EXTREMITIES:  No clubbing, cyanosis or edema.    PSYCHIATRIC: Mood/affect normal, judgement normal, appropriate    NEUROLOGICAL:    Cognition:   Fully  oriented.  Fund of knowledge excellent.  Concentration and attention normal.   Language normal with normal comprehension, fluent speech, intact repetition and naming.   Short and long term memory appears intact    Cranial nerves;    II - pupils bilaterally equal reacting to light,  No new Visual field deficits;  Fundoscopic exam- Not able to be done, non-dilated exam  III,IV,VI: EOMI with no diplopia  V: Normal facial sensations  VII: No facial asymmetry,  VIII: No New hearing abnormality  IX, X, XI: normal gag and shoulder shrug;  XII: tongue is in the midline.    Sensory:  Intact to light touch in all extremities.     Motor: Strength 5/5 bilaterally upper and lower extremities. No involuntary movements present. Normal tone and bulk.  Deep tendon reflexes: 2/4 and symmetrical in biceps, brachioradialis, triceps, bilateral 2/4 knees and ankles. Both plantars are flexor.    Cerebellar: Finger to nose and mirror movements normal bilaterally.    Gait and balance: deferred    ________________________________________________________   RESULTS REVIEW:    VITAL SIGNS:   Temp:  [97.7 °F (36.5 °C)-98.5 °F (36.9 °C)] 97.7 °F (36.5 °C)  Heart Rate:  [61-80] 61  Resp:  [14-19] 16  BP: (103-127)/(52-74) 120/62     LABS:      Lab 10/24/23  0511 10/23/23  1429 10/21/23  1821   WBC 4.40 4.20 5.40   HEMOGLOBIN 11.0* 11.8* 12.1   HEMATOCRIT 35.4 36.6 37.8   PLATELETS 164 163 177   NEUTROS ABS 2.90 3.50 4.00   LYMPHS ABS 0.90 0.50* 0.70   MONOS ABS 0.40 0.30 0.50   EOS ABS 0.10 0.00 0.10   MCV 82.3 80.2 80.7   PROTIME  --  12.4*  --    APTT  --  25.7*  --          Lab 10/24/23  0511 10/23/23  1429 10/21/23  1821   SODIUM 138 141 141   POTASSIUM 4.7 3.6 3.7   CHLORIDE 107 107 108*   CO2 17.0* 20.0* 19.0*   ANION GAP 14.0 14.0 14.0   BUN 20 15 14   CREATININE 0.90 0.90 1.05*   EGFR 74.7 74.7 62.1   GLUCOSE 53* 91 104*   CALCIUM 9.8 9.8 9.8         Lab 10/23/23  1429 10/21/23  1821   TOTAL PROTEIN 6.9 7.1   ALBUMIN 4.4 4.5   GLOBULIN 2.5  2.6   ALT (SGPT) 7 8   AST (SGOT) 13 14   BILIRUBIN 0.2 0.2   ALK PHOS 112 118*   LIPASE 18 18         Lab 10/23/23  2157 10/23/23  1949 10/23/23  1429   HSTROP T 28* 26*  --    PROTIME  --   --  12.4*   INR  --   --  1.15*                 UA          10/21/2023    18:42 10/23/2023    15:43   Urinalysis   Squamous Epithelial Cells, UA 0-2  0-2    Specific Gravity, UA 1.011  1.019    Ketones, UA Negative  40 mg/dL (2+)    Blood, UA Negative  Negative    Leukocytes, UA Moderate (2+)  Small (1+)    Nitrite, UA Negative  Negative    RBC, UA None Seen  3-5    WBC, UA 3-5  0-2    Bacteria, UA Trace  None Seen        Lab Results   Component Value Date    TSH 1.150 05/11/2022    [id number] 1,427 (H) 06/23/2020       IMAGING STUDIES:  MRI Angiogram Head Without Contrast    Result Date: 10/24/2023  Impression: Normal study. Electronically Signed: Domingo Garay MD  10/24/2023 7:08 PM EDT  Workstation ID: [id number]    MRI Brain With & Without Contrast    Result Date: 10/24/2023  Impression: Normal study. Electronically Signed: Domingo Garay MD  10/24/2023 6:51 PM EDT  Workstation ID: [id number] [facility]    CT Abdomen Pelvis Without Contrast    Result Date: 10/23/2023  1. Noncalcified nodules at the lung bases similar to prior CT chest. Please see CT chest comparison for further recommendations 2. Nonobstructing renal calculi bilaterally 3. Dilation of the common bile duct compatible with postcholecystectomy ectasia. Please correlate with liver function tests and bilirubin levels if clinically indicated 4. Other incidental findings as above Electronically Signed: David Guerra MD  10/23/2023 3:14 PM EDT  Workstation ID: [id number]    CT Head Without Contrast    Result Date: 10/23/2023  Impression: No significant findings. Electronically Signed: Shanta Alegre MD  10/23/2023 3:00 PM EDT  Workstation ID: [id number]     I reviewed the patient's new clinical results.    ________________________________________________________     PROBLEM  LIST:    Abdominal pain      ASSESSMENT/PLAN:  1. Pt presents with a complicated medical history including a long history of frequent migraines and new diagnosis of seizures last year with no known cause. She has been on Topamax for many years for migraines and it was increased to 250mg BID when she had a seizure last November. The usual max dose of Topamax is 400mg/day and increased side effects can be noted with the higher end of doses. Pt's c/o GI discomfort is a rather common complaint of Topamax. She also complains of memory/cognitive issues, word finding problems, fatigue, weight loss, and appetite changes that have affected her significantly. These have all been reported as fairly common side effects of Topamax. Most notably, pt has a hx of renal stones and currently has stones on her CT. Topamax can increase the risk of stones by 2-4 times. Her seizures episodes have decreased after starting Vimpat which is generally well tolerated.   - We had a very detailed discussion regarding treatments/medications and risks/benefits/side effects. Pt was not aware of many side effects associated with Topamax.   - It is strongly recommended to wean off Topamax due to probable association with at least several of pt's complaints, but also due to the increased association with renal stones. Plan to decrease dose by 50mg (am and pm) every 2 days until off.   - Plan to increase Vimpat to 150mg BID  - Recommend outpt follow up with epileptologist due to persistence and unusual nature of her spells.   - We will obtain and MRI brain w/wo contrast while she is here as she does not believe she has had a scan with contrast previously and no cause of seizures has been identified.   - Monitor for seizures. There is an increased risk of breakthrough seizures any time antiepileptics are adjusted. Seizure precautions.   - Pt is following pain management for migraines     Will follow     I discussed the patient's findings and my  recommendations with patient, family, nursing staff, and consulting provider    DAVI Perez  10/24/23  23:04 EDT

## 2023-10-25 NOTE — PROGRESS NOTES
LOS: 0 days   Patient Care Team:  Piper Levine APRN as PCP - General (Nurse Practitioner)  Shanita Rodriguez as Technologist      Subjective     Subjective: Patient reports feeling better today.  She was able to tolerate Jell-O and liquids last night and is not requiring pain meds.  Abdominal pain is improved.  No nausea.      ROS:   Review of Systems   Constitutional:  Negative for chills and fever.   Respiratory:  Negative for cough and shortness of breath.    Cardiovascular:  Negative for chest pain and palpitations.   Gastrointestinal:  Positive for abdominal pain. Negative for blood in stool, nausea and vomiting.   Psychiatric/Behavioral:  Negative for agitation and confusion.         Medication Review:   Scheduled Meds:albuterol, 0.63 mg, Nebulization, Q4H - RT  budesonide-formoterol, 1 puff, Inhalation, BID - RT  dilTIAZem CD, 240 mg, Oral, Daily  hydroxychloroquine, 200 mg, Oral, BID  lacosamide, 150 mg, Oral, Q12H  levothyroxine, 75 mcg, Oral, Q AM  pantoprazole, 40 mg, Oral, BID AC  sertraline, 50 mg, Oral, Daily  sodium chloride, 10 mL, Intravenous, Q12H  sucralfate, 1 g, Oral, 4x Daily  topiramate, 200 mg, Oral, Q12H   Followed by  [START ON 10/27/2023] topiramate, 150 mg, Oral, Q12H   Followed by  [START ON 10/29/2023] topiramate, 100 mg, Oral, Q12H   Followed by  [START ON 10/31/2023] topiramate, 50 mg, Oral, Q12H      Continuous Infusions:dextrose, 50 mL/hr  lactated ringers, 75 mL/hr, Last Rate: 75 mL/hr (10/25/23 0707)      PRN Meds:.  acetaminophen **OR** acetaminophen **OR** acetaminophen    aluminum-magnesium hydroxide-simethicone    polyethylene glycol **AND** bisacodyl **AND** bisacodyl    Calcium Replacement - Follow Nurse / BPA Driven Protocol    cetirizine    dextrose    HYDROcodone-acetaminophen    Magnesium Standard Dose Replacement - Follow Nurse / BPA Driven Protocol    meclizine    melatonin    Morphine    ondansetron **OR** ondansetron    ondansetron **OR** ondansetron     Phosphorus Replacement - Follow Nurse / BPA Driven Protocol    Potassium Replacement - Follow Nurse / BPA Driven Protocol    [COMPLETED] Insert Peripheral IV **AND** sodium chloride    sodium chloride    sodium chloride    SUMAtriptan      Objective     Vital Signs  Temp:  [97.7 °F (36.5 °C)-98 °F (36.7 °C)] 97.7 °F (36.5 °C)  Heart Rate:  [52-80] 56  Resp:  [16-19] 19  BP: (103-153)/(52-98) 120/98    Physical Exam:    General Appearance:    Awake and alert, in no acute distress   Head:    Normocephalic, without obvious abnormality   Eyes:          Conjunctivae normal, anicteric sclera   Ears:    Hearing intact   Throat:   No oral lesions, no thrush, oral mucosa moist   Neck:   No adenopathy, supple, no JVD   Lungs:     Respirations regular, even and unlabored       Abdomen:     Soft, mild generalized tenderness, no rebound or guarding, non-distended, no hepatosplenomegaly   Rectal:     Deferred   Extremities:   No edema, no cyanosis, no redness   Skin:   No bleeding, bruising or rash, no jaundice   Neurologic:   Sensation intact        Results Review:    CBC  Results from last 7 days   Lab Units 10/24/23  0511 10/23/23  1429 10/21/23  1821   RBC 10*6/mm3 4.30 4.57 4.69   WBC 10*3/mm3 4.40 4.20 5.40   HEMOGLOBIN g/dL 11.0* 11.8* 12.1   PLATELETS 10*3/mm3 164 163 177       CMP  Results from last 7 days   Lab Units 10/24/23  1441 10/24/23  0511 10/23/23  1429 10/21/23  1821   SODIUM mmol/L  --  138 141 141   POTASSIUM mmol/L  --  4.7 3.6 3.7   CHLORIDE mmol/L  --  107 107 108*   CO2 mmol/L  --  17.0* 20.0* 19.0*   BUN mg/dL  --  20 15 14   CREATININE mg/dL  --  0.90 0.90 1.05*   GLUCOSE mg/dL  --  53* 91 104*   ALBUMIN g/dL  --   --  4.4 4.5   BILIRUBIN mg/dL  --   --  0.2 0.2   ALK PHOS U/L  --   --  112 118*   AST (SGOT) U/L  --   --  13 14   ALT (SGPT) U/L  --   --  7 8   AMMONIA umol/L 12  --   --   --        Amylase and Lipase  Results from last 7 days   Lab Units 10/23/23  1429 10/21/23  1821   LIPASE U/L 18 18        CRP         Imaging Results (Last 24 Hours)       Procedure Component Value Units Date/Time    CT Abdomen Pelvis With Contrast [[id number]] Collected: 10/25/23 0159     Updated: 10/25/23 0216    Narrative:      CT ABDOMEN PELVIS W CONTRAST    Date of Exam: 10/25/2023 1:25 AM EDT    Indication: Abdominal pain, acute, nonlocalized.    Comparison: CT abdomen pelvis from 10/23/2023    Technique: Axial CT images were obtained of the abdomen and pelvis following the uneventful intravenous administration of iodinated contrast. Sagittal and coronal reconstructions were performed.  Automated exposure control and iterative reconstruction   methods were used.    Findings:  There are stable 1 to 4 mm noncalcified lower lobe pulmonary nodules.    There are clips from cholecystectomy. The liver, bilateral adrenal glands, pancreas and spleen are normal. There are nonobstructing bilateral renal calculi.    The abdominal and pelvic portions of the GI tract are within normal limits with diverticulosis but no diverticulitis. Patient is status post appendectomy. The osseous structures are normal.      Impression:      Impression:  No acute abdominal or pelvic abnormality.            Electronically Signed: Rohith Vu MD    10/25/2023 2:14 AM EDT    Workstation ID: [id number]    MRI Angiogram Head Without Contrast [[id number]] Collected: 10/24/23 1852     Updated: 10/24/23 1910    Narrative:      MRI ANGIOGRAM HEAD WO CONTRAST    Date of Exam: 10/24/2023 5:44 PM EDT    Indication: Worsening headaches.     Comparison: None available.    Technique:  Routine 3-D time-of-flight gradient echo imaging was obtained of the head without contrast administration.      Findings:  The internal carotid arteries are patent. The distal vertebral and basilar arteries are patent. The anterior, middle, and posterior cerebral arteries are patent. There is no saccular aneurysm or arteriovenous malformation.      Impression:      Impression:  Normal  study.        Electronically Signed: Domingo Garay MD    10/24/2023 7:08 PM EDT    Workstation ID: [id number]    MRI Brain With & Without Contrast [[id number]] Collected: 10/24/23 1845     Updated: 10/24/23 1853    Narrative:      MRI BRAIN W WO CONTRAST    Date of Exam: 10/24/2023 5:35 PM EDT    Indication: Chronic headaches.     Comparison: MRI of the brain performed on 3/27/2023 and a CT of the head performed on 10/23/2023.    Technique:  Routine multiplanar/multisequence sequence images of the brain were obtained before and after the uneventful administration of Prohance.      Findings:  The sulci, fissures, ventricles, basal cisterns are within range of normal. There is no restricted diffusion to indicate acute ischemia. There is no abnormal contrast enhancement. There is no acute hemorrhage, midline shift, or suspicious extra-axial   fluid collections. The temporal lobes are symmetric. There are normal signal voids within the intracranial arteries and the major dural sinuses. The orbital contents are normal. The visualized paranasal and mastoid sinuses are clear.      Impression:      Impression:  Normal study.        Electronically Signed: Domingo Garay MD    10/24/2023 6:51 PM EDT    Workstation ID: [id number]  Prohance              Assessment & Plan   58-year-old female presented to the hospital with persistent upper abdominal pain.  Originally related this to any medication but has persisted.  She has also lost weight.     Upper abdominal pain  Nausea  Weight loss  Mild normocytic anemia  Seizures  COPD/lung nodules  Sjogren's disease  Lupus  History of cholecystectomy        Plan:  Status post EGD 10/24/2023 by Dr. Davies with nonerosive gastritis.    She reports feeling better today.  Less abdominal pain.  No nausea.  Is not requiring pain medication.  Was able to tolerate liquid diet.  Contrasted CT was performed last night and was unremarkable of the GI tract.  Consider symptoms related to recent medications  and these are being adjusted.  Advance to soft diet.  Continue PPI.  Patient can be discharged home from GI standpoint.      Kimber Berry, DAVI  10/25/23  10:41 EDT

## 2023-10-25 NOTE — CASE MANAGEMENT/SOCIAL WORK
Continued Stay Note   [facility]     Patient Name: Tasneem Valentino  MRN: [id number]  Today's Date: 10/25/2023    Admit Date: 10/23/2023    Plan: Return Home   Discharge Plan       Row Name 10/25/23 0954       Plan    Plan Comments DC Barriers: wean of Topamax; increase Vimpat; follow for breakthrough seizures                   Discharge Codes    No documentation.                 Expected Discharge Date and Time       Expected Discharge Date Expected Discharge Time    Oct 25, 2023           Eliane Paris RN, [facility]  Office: [phone]  Fax: [phone]  [email or web address]      Phone communication or documentation only - no physical contact with patient or family.    Eliane Paris RN

## 2023-10-26 ENCOUNTER — TELEPHONE (OUTPATIENT)
Dept: FAMILY MEDICINE CLINIC | Facility: CLINIC | Age: 58
End: 2023-10-26
Payer: COMMERCIAL

## 2023-10-26 ENCOUNTER — TRANSITIONAL CARE MANAGEMENT TELEPHONE ENCOUNTER (OUTPATIENT)
Dept: CALL CENTER | Facility: HOSPITAL | Age: 58
End: 2023-10-26
Payer: COMMERCIAL

## 2023-10-26 DIAGNOSIS — R11.0 NAUSEA: Primary | ICD-10-CM

## 2023-10-26 RX ORDER — PROMETHAZINE HYDROCHLORIDE 25 MG/1
12.5-25 TABLET ORAL EVERY 8 HOURS PRN
Qty: 30 TABLET | Refills: 0 | Status: SHIPPED | OUTPATIENT
Start: 2023-10-26

## 2023-10-26 NOTE — TELEPHONE ENCOUNTER
Mrs Valentino called and wanted to know if she could get some phenergan called in .The zofran that they gave her at the hospital  is not working for her. Please advise...

## 2023-10-26 NOTE — OUTREACH NOTE
Call Center TCM Note      Flowsheet Row Responses   [facility] patient discharged from? [facility]   Does the patient have one of the following disease processes/diagnoses(primary or secondary)? Other   TCM attempt successful? Yes   Call start time 0906   Call end time 0909   Discharge diagnosis Upper Abdomin Pain   Meds reviewed with patient/caregiver? Yes   Is the patient having any side effects they believe may be caused by any medication additions or changes? No   Does the patient have all medications ordered at discharge? Yes   Is the patient taking all medications as directed (includes completed medication regime)? Yes   Does the patient have an appointment with their PCP within 7-14 days of discharge? No   Nursing Interventions Patient declined scheduling/rescheduling appointment at this time   Has home health visited the patient within 72 hours of discharge? N/A   Psychosocial issues? No   Did the patient receive a copy of their discharge instructions? Yes   Nursing interventions Reviewed instructions with patient   What is the patient's perception of their health status since discharge? Same   Is the patient/caregiver able to teach back the hierarchy of who to call/visit for symptoms/problems? PCP, Specialist, Home health nurse, Urgent Care, ED, 911 Yes   TCM call completed? Yes   Call end time 0909   Would this patient benefit from a Referral to Amb Social Work? No   Is the patient interested in additional calls from an ambulatory ? No            Mei Troncoso RN    10/26/2023, 09:09 EDT

## 2023-10-30 ENCOUNTER — OFFICE (OUTPATIENT)
Dept: URBAN - METROPOLITAN AREA CLINIC 64 | Facility: CLINIC | Age: 58
End: 2023-10-30
Payer: COMMERCIAL

## 2023-10-30 VITALS
SYSTOLIC BLOOD PRESSURE: 110 MMHG | WEIGHT: 128 LBS | HEIGHT: 68 IN | HEART RATE: 74 BPM | DIASTOLIC BLOOD PRESSURE: 77 MMHG

## 2023-10-30 DIAGNOSIS — R10.13 EPIGASTRIC PAIN: ICD-10-CM

## 2023-10-30 DIAGNOSIS — K29.70 GASTRITIS, UNSPECIFIED, WITHOUT BLEEDING: ICD-10-CM

## 2023-10-30 DIAGNOSIS — R11.0 NAUSEA: ICD-10-CM

## 2023-10-30 DIAGNOSIS — R19.7 DIARRHEA, UNSPECIFIED: ICD-10-CM

## 2023-10-30 PROCEDURE — 99214 OFFICE O/P EST MOD 30 MIN: CPT | Performed by: NURSE PRACTITIONER

## 2023-11-13 ENCOUNTER — TELEPHONE (OUTPATIENT)
Dept: PAIN MEDICINE | Facility: CLINIC | Age: 58
End: 2023-11-13
Payer: COMMERCIAL

## 2023-11-15 ENCOUNTER — READMISSION MANAGEMENT (OUTPATIENT)
Dept: CALL CENTER | Facility: HOSPITAL | Age: 58
End: 2023-11-15
Payer: COMMERCIAL

## 2023-11-15 RX ORDER — HYDROCODONE BITARTRATE AND ACETAMINOPHEN 10; 300 MG/1; MG/1
1 TABLET ORAL EVERY 6 HOURS PRN
Qty: 120 TABLET | Refills: 0 | Status: SHIPPED | OUTPATIENT
Start: 2023-11-15

## 2023-11-15 NOTE — OUTREACH NOTE
Medical Week 3 Survey      Flowsheet Row Responses   [facility] [place] patient discharged from? [facility]   Does the patient have one of the following disease processes/diagnoses(primary or secondary)? Other   Week 3 attempt successful? Yes   Call start time 0850   Call end time 0855   Discharge diagnosis Upper Abdominal Pain, seizure disorder   Person spoke with today (if not patient) and relationship Patient   Meds reviewed with patient/caregiver? Yes   Does the patient have all medications ordered at discharge? Yes   Is the patient taking all medications as directed (includes completed medication regime)? Yes   Does the patient have a primary care provider?  Yes   Comments regarding PCP Follow up with DAVI Wilson PCP. Patient reports that she has a PCP appt in place.   Does the patient have an appointment with their PCP within 7 days of discharge? Greater than 7 days   Has the patient kept scheduled appointments due by today? Yes  [Patient reports that she has had follow up with GI.]   Has home health visited the patient within 72 hours of discharge? N/A   Psychosocial issues? No   Did the patient receive a copy of their discharge instructions? Yes   What is the patient's perception of their health status since discharge? Improving   Is the patient/caregiver able to teach back signs and symptoms related to disease process for when to call PCP? Yes   Is the patient/caregiver able to teach back the hierarchy of who to call/visit for symptoms/problems? PCP, Specialist, Home health nurse, Urgent Care, ED, 911 Yes   If the patient is a current smoker, are they able to teach back resources for cessation? Not a smoker   Week 3 Call Completed? Yes   Graduated Yes   Is the patient interested in additional calls from an ambulatory ? No   Would this patient benefit from a Referral to Amb Social Work? No   Graduated/Revoked comments Denies any questions or needs today.   Call end time 0855            AMISH  F - Registered Nurse

## 2023-11-16 ENCOUNTER — TELEPHONE (OUTPATIENT)
Dept: FAMILY MEDICINE CLINIC | Facility: CLINIC | Age: 58
End: 2023-11-16

## 2023-11-16 ENCOUNTER — OFFICE VISIT (OUTPATIENT)
Dept: FAMILY MEDICINE CLINIC | Facility: CLINIC | Age: 58
End: 2023-11-16
Payer: COMMERCIAL

## 2023-11-16 VITALS
OXYGEN SATURATION: 99 % | DIASTOLIC BLOOD PRESSURE: 75 MMHG | HEIGHT: 68 IN | BODY MASS INDEX: 19.85 KG/M2 | HEART RATE: 75 BPM | SYSTOLIC BLOOD PRESSURE: 138 MMHG | TEMPERATURE: 97.2 F | WEIGHT: 131 LBS

## 2023-11-16 DIAGNOSIS — N32.89 BLADDER SPASMS: ICD-10-CM

## 2023-11-16 DIAGNOSIS — N20.0 KIDNEY STONES: ICD-10-CM

## 2023-11-16 DIAGNOSIS — N30.01 ACUTE CYSTITIS WITH HEMATURIA: ICD-10-CM

## 2023-11-16 DIAGNOSIS — R30.0 DYSURIA: Primary | ICD-10-CM

## 2023-11-16 LAB
BILIRUB BLD-MCNC: NEGATIVE MG/DL
CLARITY, POC: ABNORMAL
COLOR UR: YELLOW
EXPIRATION DATE: ABNORMAL
GLUCOSE UR STRIP-MCNC: NEGATIVE MG/DL
KETONES UR QL: NEGATIVE
LEUKOCYTE EST, POC: ABNORMAL
Lab: ABNORMAL
NITRITE UR-MCNC: NEGATIVE MG/ML
PH UR: 5.5 [PH] (ref 5–8)
PROT UR STRIP-MCNC: ABNORMAL MG/DL
RBC # UR STRIP: ABNORMAL /UL
SP GR UR: 1.03 (ref 1–1.03)
UROBILINOGEN UR QL: ABNORMAL

## 2023-11-16 PROCEDURE — 99213 OFFICE O/P EST LOW 20 MIN: CPT | Performed by: NURSE PRACTITIONER

## 2023-11-16 PROCEDURE — 81003 URINALYSIS AUTO W/O SCOPE: CPT | Performed by: NURSE PRACTITIONER

## 2023-11-16 RX ORDER — PHENAZOPYRIDINE HYDROCHLORIDE 100 MG/1
100 TABLET, FILM COATED ORAL 3 TIMES DAILY PRN
Qty: 9 TABLET | Refills: 0 | Status: SHIPPED | OUTPATIENT
Start: 2023-11-16

## 2023-11-16 RX ORDER — NITROFURANTOIN 25; 75 MG/1; MG/1
100 CAPSULE ORAL 2 TIMES DAILY
Qty: 10 CAPSULE | Refills: 0 | Status: SHIPPED | OUTPATIENT
Start: 2023-11-16 | End: 2023-11-20

## 2023-11-16 RX ORDER — PHENAZOPYRIDINE HYDROCHLORIDE 100 MG/1
100 TABLET, FILM COATED ORAL 3 TIMES DAILY PRN
Qty: 3 TABLET | Refills: 0 | Status: SHIPPED | OUTPATIENT
Start: 2023-11-16 | End: 2023-11-16 | Stop reason: SDUPTHER

## 2023-11-16 NOTE — PROGRESS NOTES
"Chief Complaint  Difficulty Urinating        Tasneem Valentino presents to [facility]        Subjective      58-year-old female patient presents today with complaints of difficulty urinating and bladder spasms.    Home from hospital 10/25/23 - was dx with bilateral stones 4 & 5 mm. She has felt like stones \"have been moving\". With urgency and hesitancy. She is with bladder pain. Has never been with UTI.                   Objective         Vital Signs:     /75 (BP Location: Left arm, Patient Position: Sitting, Cuff Size: Adult)   Pulse 75   Temp 97.2 °F (36.2 °C) (Infrared)   Ht 172.7 cm (67.99\")   Wt 59.4 kg (131 lb)   SpO2 99%   BMI 19.92 kg/m²       Physical Exam  Vitals reviewed.   Constitutional:       Appearance: She is well-developed.      Comments:      HENT:      Head: Normocephalic and atraumatic.      Mouth/Throat:      Mouth: Mucous membranes are moist.      Pharynx: Oropharynx is clear.   Eyes:      Conjunctiva/sclera: Conjunctivae normal.      Pupils: Pupils are equal, round, and reactive to light.   Cardiovascular:      Rate and Rhythm: Normal rate.   Pulmonary:      Effort: Pulmonary effort is normal. No respiratory distress.   Abdominal:      General: Bowel sounds are normal. There is no distension.      Palpations: Abdomen is soft. There is no mass.      Tenderness: There is abdominal tenderness in the suprapubic area. There is no right CVA tenderness, left CVA tenderness, guarding or rebound.      Hernia: No hernia is present.       Musculoskeletal:         General: Normal range of motion.      Cervical back: Normal range of motion.   Skin:     General: Skin is warm and dry.      Findings: No rash.   Neurological:      Mental Status: She is alert and oriented to person, place, and time.   Psychiatric:         Behavior: Behavior normal.                History of Present Illness      Patient Active Problem List   Diagnosis    Chronic daily headache    " Intractable chronic migraine without aura    Lupus (systemic lupus erythematosus)    Pain in joint involving multiple sites    Anemia    Anxiety    Migraine    Meniere's disease    Hypothyroidism    Depressive disorder    Coronary artery spasm    Aortic insufficiency    Hypertension    History of Lyme disease    Kidney disease, chronic, stage II (GFR 60-89 ml/min)    Nodule of skin of left upper extremity    Weakness of right upper extremity    Vitamin D deficiency    History of renal stone    Nausea    Cervicalgia    Cervical spondylosis without myelopathy    Neuroforaminal stenosis of cervical spine    Cervical radiculopathy    Hoarseness of voice    Screen for colon cancer    Essential hypertension    Acute non-recurrent maxillary sinusitis    SOB (shortness of breath) on exertion    Moderate persistent asthma without complication    Solitary lung nodule    Hoarse voice quality    Medication refill    Sore throat    MELTON (dyspnea on exertion)    Multiple pulmonary nodules    Intermittent lightheadedness    Dizziness    Partial symptomatic epilepsy with complex partial seizures, not intractable, without status epilepticus    Screening, lipid    Stomach ache    Stress at home    Generalized abdominal pain    Abdominal pain         Past Medical History:   Diagnosis Date    Acute non-recurrent maxillary sinusitis 05/11/2022    Allergic     Anemia     Asthma     COVID-19 07/28/2021    COVID-19 05/21/2022    Epilepsy     Headache     Heart disease     HL (hearing loss)     Hypertension     Hypothyroidism     Kidney disease     Lupus     Migraine     Renal insufficiency     Seizures     Sjogren's disease     Solitary lung nodule 09/27/2022    Thyroid disease     Tremor     Upper respiratory tract infection 11/08/2021          Family History   Problem Relation Age of Onset    Hypertension Mother     Cancer Father     Arthritis Sister     Heart disease Brother     No Known Problems Brother     No Known Problems Brother            Past Surgical History:   Procedure Laterality Date    APPENDECTOMY      BRAIN SURGERY      occipital Neurotomy    CARDIAC CATHETERIZATION      CHOLECYSTECTOMY      ENDOSCOPY N/A 10/24/2023    Procedure: ESOPHAGOGASTRODUODENOSCOPY WITH BIOPSY X1 AREA;  Surgeon: Wesley Davies MD;  Location: [facility] ENDOSCOPY;  Service: Gastroenterology;  Laterality: N/A;  POST: GASTRITIS    OCCIPITAL NEURECTOMY  2010    OVARY SURGERY      SINUS SURGERY            Social History     Socioeconomic History    Marital status:    Tobacco Use    Smoking status: Never     Passive exposure: Never    Smokeless tobacco: Never   Vaping Use    Vaping Use: Never used   Substance and Sexual Activity    Alcohol use: No    Drug use: Never    Sexual activity: Defer                    Result Review :                                  BMI is within normal parameters. No other follow-up for BMI required.               Assessment and Plan      Diagnoses and all orders for this visit:    1. Dysuria (Primary)  -     POC Urinalysis Dipstick, Automated  -     nitrofurantoin, macrocrystal-monohydrate, (Macrobid) 100 MG capsule; Take 1 capsule by mouth 2 (Two) Times a Day.  Dispense: 10 capsule; Refill: 0  -     XR Abdomen KUB (In Office)    2. Bladder spasms  -     phenazopyridine (Pyridium) 100 MG tablet; Take 1 tablet by mouth 3 (Three) Times a Day As Needed for Bladder Spasms.  Dispense: 3 tablet; Refill: 0    3. Acute cystitis with hematuria  -     nitrofurantoin, macrocrystal-monohydrate, (Macrobid) 100 MG capsule; Take 1 capsule by mouth 2 (Two) Times a Day.  Dispense: 10 capsule; Refill: 0    4. Kidney stones  -     XR Abdomen KUB (In Office)        UA positive for leukocytes.Treating with Macrobid twice daily for 5 days.  We will send for culture and notify patient of results and if any changes need to be made to antibiotic therapy.  We will go ahead and obtain KUB to ensure stability of stones.  Will notify patient when results are  returned                Follow Up       No follow-ups on file.      Patient was given instructions and counseling regarding her condition or for health maintenance advice. Please see specific information pulled into the AVS if appropriate.     Piper Levine, APRN11/16/202314:36 EST  This note has been electronically signed

## 2023-11-16 NOTE — TELEPHONE ENCOUNTER
Caller: Tasneem Valentino    Relationship: Self    Best call back number: [phone]    Which medication are you concerned about:   phenazopyridine (Pyridium) 100 MG tablet     Who prescribed you this medication: ELENA    When did you start taking this medication: N/A    What are your concerns: MEDICATION WAS PICKED UP BUT THE SCRIPT WAS ONLY WRITTEN FOR 1 DAY(3 PILLS)    IS THIS CORRECT? PLEASE CALL PATIENT TO CLARIFY

## 2023-11-20 ENCOUNTER — TELEPHONE (OUTPATIENT)
Dept: FAMILY MEDICINE CLINIC | Facility: CLINIC | Age: 58
End: 2023-11-20
Payer: COMMERCIAL

## 2023-11-20 LAB
BACTERIA UR CULT: ABNORMAL
BACTERIA UR CULT: ABNORMAL
OTHER ANTIBIOTIC SUSC ISLT: ABNORMAL

## 2023-11-20 NOTE — TELEPHONE ENCOUNTER
Caller: Tasneem Valentino    Relationship: Self    Best call back number:     [phone]        Requested Prescriptions: ANTIBIOTIC  Requested Prescriptions      No prescriptions requested or ordered in this encounter        Pharmacy where request should be sent:    [id number] - [place], [place] - [address] AT [facility] [address] - [phone]  [phone]   Last office visit with prescribing clinician: 11/16/2023   Last telemedicine visit with prescribing clinician: Visit date not found   Next office visit with prescribing clinician: 11/27/2023     Additional details provided by patient: PATIENT IS CALLING TO STATE SHE IS ON THE LAST DAY OF ANTIBIOTIC FOR THE UTI.  SHE IS WANTING TO KNOW IF THE CULTURE RESULTS CAME BACK AND IF SHE NEEDS A DIFFERENT MEDICATION.  SHE STATES SHE IS STILL HAVING PAINFUL URINATION AND NOW HAS WHAT SHE THINKS IS A SINUS INFECTION.  FACIAL PAIN, DRAINAGE, BODY ACHES, AND LOSING HER VOICE.  SHE IS WANTING TO KNOW IF SHE COULD GET A MEDICATION TO TAKE CARE OF BOTH THE SINUS INFECTION AND UTI.    Does the patient have less than a 3 day supply:  [x] Yes  [] No    Would you like a call back once the refill request has been completed: [] Yes [] No    If the office needs to give you a call back, can they leave a voicemail: [] Yes [] No    Bashir Pretty Rep   11/20/23 09:36 EST     PLEASE ADVISE.

## 2023-11-20 NOTE — TELEPHONE ENCOUNTER
Yes however I do not have any appointments available in about the rest of the week.  She may be able to get in with another provider

## 2023-11-20 NOTE — TELEPHONE ENCOUNTER
Spoke with pt, she is aware, states she has been using Flonase and over counter medication along with a netipot since Friday and is still not feeling any better and wants to know if she needs to schedule appt then

## 2023-11-20 NOTE — TELEPHONE ENCOUNTER
She is on the appropriate antibiotic for the UTI.  It came back as E. coli.  UTI was very mild.  I would encourage increasing fluid intake with water and completing the antibiotic as we discussed.  Regarding the sinus infection it is not a good idea to do back-to-back antibiotics.  I would recommend Flonase, Mucinex, antihistamine.

## 2023-12-14 ENCOUNTER — OFFICE VISIT (OUTPATIENT)
Dept: FAMILY MEDICINE CLINIC | Facility: CLINIC | Age: 58
End: 2023-12-14
Payer: COMMERCIAL

## 2023-12-14 VITALS
WEIGHT: 124 LBS | OXYGEN SATURATION: 99 % | HEIGHT: 68 IN | HEART RATE: 70 BPM | DIASTOLIC BLOOD PRESSURE: 85 MMHG | TEMPERATURE: 97.2 F | BODY MASS INDEX: 18.79 KG/M2 | SYSTOLIC BLOOD PRESSURE: 130 MMHG

## 2023-12-14 DIAGNOSIS — K29.70 GASTRITIS WITHOUT BLEEDING, UNSPECIFIED CHRONICITY, UNSPECIFIED GASTRITIS TYPE: ICD-10-CM

## 2023-12-14 DIAGNOSIS — R53.83 OTHER FATIGUE: ICD-10-CM

## 2023-12-14 DIAGNOSIS — R06.09 DOE (DYSPNEA ON EXERTION): ICD-10-CM

## 2023-12-14 DIAGNOSIS — G40.209 PARTIAL SYMPTOMATIC EPILEPSY WITH COMPLEX PARTIAL SEIZURES, NOT INTRACTABLE, WITHOUT STATUS EPILEPTICUS: Primary | ICD-10-CM

## 2023-12-14 DIAGNOSIS — D50.9 IRON DEFICIENCY ANEMIA, UNSPECIFIED IRON DEFICIENCY ANEMIA TYPE: ICD-10-CM

## 2023-12-14 DIAGNOSIS — R11.0 NAUSEA: ICD-10-CM

## 2023-12-14 PROCEDURE — 99214 OFFICE O/P EST MOD 30 MIN: CPT | Performed by: NURSE PRACTITIONER

## 2023-12-14 RX ORDER — FAMOTIDINE 20 MG/1
20 TABLET, FILM COATED ORAL 2 TIMES DAILY
COMMUNITY

## 2023-12-14 NOTE — PROGRESS NOTES
"Chief Complaint  Seizures        Tasneem Valentino presents to [facility]      Subjective      58-year-old female patient presents today for 1 month follow-up of seizure activity.    She saw neurology on 11/27/2023.  She had not had any seizure activity since last visit with them.  Topiramate was discontinued due to a kidney stone history and lacosamide was discontinued due to gastrointestinal pain, which she was seen in the emergency department for back at the end of October.  Prophylactic PPI treatment was continued per gastroenterology due to underlying gastritis post EGD 10/24/2023. Neurology stated they would not resume any antiepileptics unless another episode of seizure activity occurred. Dr. Santiago stated for patient to call office if anything changes regarding this he presumes that she will have return of seizure activity.  She does states fogginess has resolved since stopping antiepileptic therapy.  She is with intermittent headaches still.    Saw GI on 10/30/2023. Will follow up in February for the underlying gastritis.  She is still with some intermittent nausea.      Saw cardiology and had cardiac catherization last month. Has PFO, and ruled out pulmonary HTN. Records in chart for review.  She does report she is still intermittently fatigued and with MELTON.  She states that cardiology told her she was \"anemic \".                      Objective         Vital Signs:     /85 (BP Location: Right arm, Patient Position: Sitting, Cuff Size: Adult)   Pulse 70   Temp 97.2 °F (36.2 °C) (Infrared)   Ht 172.7 cm (67.99\")   Wt 56.2 kg (124 lb)   SpO2 99%   BMI 18.86 kg/m²       Physical Exam  Vitals reviewed.   Constitutional:       Appearance: She is well-developed.      Comments:      HENT:      Head: Normocephalic and atraumatic.      Nose: Nose normal.      Mouth/Throat:      Mouth: Mucous membranes are moist.      Pharynx: Oropharynx is clear.   Eyes:      " Conjunctiva/sclera: Conjunctivae normal.      Pupils: Pupils are equal, round, and reactive to light.   Cardiovascular:      Rate and Rhythm: Normal rate and regular rhythm.      Pulses: Normal pulses.      Heart sounds: Normal heart sounds.   Pulmonary:      Effort: Pulmonary effort is normal. No respiratory distress.      Breath sounds: Normal breath sounds. No stridor. No wheezing, rhonchi or rales.   Chest:      Chest wall: No tenderness.   Musculoskeletal:         General: Normal range of motion.      Cervical back: Normal range of motion.   Skin:     General: Skin is warm and dry.      Findings: No rash.   Neurological:      General: No focal deficit present.      Mental Status: She is alert and oriented to person, place, and time.      Cranial Nerves: Cranial nerves 2-12 are intact.      Sensory: Sensation is intact.      Motor: Motor function is intact.      Coordination: Coordination is intact.      Gait: Gait is intact.   Psychiatric:         Behavior: Behavior normal.                History of Present Illness      Patient Active Problem List   Diagnosis    Chronic daily headache    Intractable chronic migraine without aura    Lupus (systemic lupus erythematosus)    Pain in joint involving multiple sites    Anemia    Anxiety    Migraine    Meniere's disease    Hypothyroidism    Depressive disorder    Coronary artery spasm    Aortic insufficiency    Hypertension    History of Lyme disease    Kidney disease, chronic, stage II (GFR 60-89 ml/min)    Nodule of skin of left upper extremity    Weakness of right upper extremity    Vitamin D deficiency    History of renal stone    Nausea    Cervicalgia    Cervical spondylosis without myelopathy    Neuroforaminal stenosis of cervical spine    Cervical radiculopathy    Hoarseness of voice    Screen for colon cancer    Essential hypertension    Acute non-recurrent maxillary sinusitis    SOB (shortness of breath) on exertion    Moderate persistent asthma without  complication    Solitary lung nodule    Hoarse voice quality    Medication refill    Sore throat    MELTON (dyspnea on exertion)    Multiple pulmonary nodules    Intermittent lightheadedness    Dizziness    Partial symptomatic epilepsy with complex partial seizures, not intractable, without status epilepticus    Screening, lipid    Stomach ache    Stress at home    Generalized abdominal pain    Abdominal pain    Other fatigue    Gastritis without bleeding         Past Medical History:   Diagnosis Date    Acute non-recurrent maxillary sinusitis 05/11/2022    Allergic     Anemia     Asthma     COVID-19 07/28/2021    COVID-19 05/21/2022    Epilepsy     Headache     Heart disease     HL (hearing loss)     Hypertension     Hypothyroidism     Kidney disease     Lupus     Migraine     Renal insufficiency     Seizures     Sjogren's disease     Solitary lung nodule 09/27/2022    Thyroid disease     Tremor     Upper respiratory tract infection 11/08/2021          Family History   Problem Relation Age of Onset    Hypertension Mother     Cancer Father     Arthritis Sister     Heart disease Brother     No Known Problems Brother     No Known Problems Brother           Past Surgical History:   Procedure Laterality Date    APPENDECTOMY      BRAIN SURGERY      occipital Neurotomy    CARDIAC CATHETERIZATION      CHOLECYSTECTOMY      ENDOSCOPY N/A 10/24/2023    Procedure: ESOPHAGOGASTRODUODENOSCOPY WITH BIOPSY X1 AREA;  Surgeon: Wesley Davies MD;  Location: [facility] ENDOSCOPY;  Service: Gastroenterology;  Laterality: N/A;  POST: GASTRITIS    OCCIPITAL NEURECTOMY  2010    OVARY SURGERY      SINUS SURGERY            Social History     Socioeconomic History    Marital status:    Tobacco Use    Smoking status: Never     Passive exposure: Never    Smokeless tobacco: Never   Vaping Use    Vaping Use: Never used   Substance and Sexual Activity    Alcohol use: No    Drug use: Never    Sexual activity: Defer                     Result Review :                                  BMI is within normal parameters. No other follow-up for BMI required.               Assessment and Plan      Diagnoses and all orders for this visit:    1. Partial symptomatic epilepsy with complex partial seizures, not intractable, without status epilepticus (Primary)    2. Gastritis without bleeding, unspecified chronicity, unspecified gastritis type    3. Nausea    4. Other fatigue  -     CBC & Differential  -     Iron  -     Vitamin B12 and Folate  -     Ferritin  -     CBC and Differential  -     Reticulocytes    5. MELTON (dyspnea on exertion)  -     CBC & Differential  -     Iron  -     Vitamin B12 and Folate  -     Ferritin  -     CBC and Differential  -     Reticulocytes    6. Iron deficiency anemia, unspecified iron deficiency anemia type      Patient currently stable.  She has not had any seizure activity since prior to seeing neurology.  She is not on any antiepileptic treatment.  Neurology states for patient to contact if she develops any activity.  Neurology presume she will resume activity at some point.  Regarding patient's intermittent headaches defer to neurology.  Patient verbalized understanding.    Abdominal pain is better since stopping the lacosamide but still with some intermittent nausea.  Recommended dietary modification and avoiding acidic food and drink, caffeine, and irritating intake.    Patient has followed up with cardiology.  She had a CT calcium score back in November 2022 which was 0.  Event monitor in past indicated no underlying arrhythmias.  Patient did have PFO on echo November 2023 and an EF of 67% with mild AR and mild MR.  Had bilateral heart cath 11/30/2023 which was normal.  Was diagnosed with anemia and iron deficiency after the heart cath.  Cardiology recommend IV iron infusions if she is unable to tolerate oral iron.                  Follow Up       Return in about 6 weeks (around 1/25/2024) for with Piper  FORREST Levine.      Patient was given instructions and counseling regarding her condition or for health maintenance advice. Please see specific information pulled into the AVS if appropriate.     Piper Levine, [id number]/14/[id number]:57 EST  This note has been electronically signed

## 2023-12-15 LAB
25(OH)D3+25(OH)D2 SERPL-MCNC: 35.1 NG/ML (ref 30–100)
ALBUMIN SERPL-MCNC: 4.4 G/DL (ref 3.8–4.9)
ALBUMIN/GLOB SERPL: 2.3 {RATIO} (ref 1.2–2.2)
ALP SERPL-CCNC: 105 IU/L (ref 44–121)
ALT SERPL-CCNC: 11 IU/L (ref 0–32)
AST SERPL-CCNC: 19 IU/L (ref 0–40)
BASOPHILS # BLD AUTO: 0 X10E3/UL (ref 0–0.2)
BASOPHILS NFR BLD AUTO: 1 %
BILIRUB SERPL-MCNC: 0.4 MG/DL (ref 0–1.2)
BUN SERPL-MCNC: 13 MG/DL (ref 6–24)
BUN/CREAT SERPL: 14 (ref 9–23)
CALCIUM SERPL-MCNC: 9.8 MG/DL (ref 8.7–10.2)
CHLORIDE SERPL-SCNC: 103 MMOL/L (ref 96–106)
CO2 SERPL-SCNC: 23 MMOL/L (ref 20–29)
CREAT SERPL-MCNC: 0.9 MG/DL (ref 0.57–1)
EGFRCR SERPLBLD CKD-EPI 2021: 75 ML/MIN/1.73
EOSINOPHIL # BLD AUTO: 0.1 X10E3/UL (ref 0–0.4)
EOSINOPHIL NFR BLD AUTO: 1 %
ERYTHROCYTE [DISTWIDTH] IN BLOOD BY AUTOMATED COUNT: 15.2 % (ref 11.7–15.4)
FERRITIN SERPL-MCNC: 20 NG/ML (ref 15–150)
FOLATE SERPL-MCNC: 10.1 NG/ML
GLOBULIN SER CALC-MCNC: 1.9 G/DL (ref 1.5–4.5)
GLUCOSE SERPL-MCNC: 90 MG/DL (ref 70–99)
HCT VFR BLD AUTO: 32.2 % (ref 34–46.6)
HGB BLD-MCNC: 10.3 G/DL (ref 11.1–15.9)
IMM GRANULOCYTES # BLD AUTO: 0 X10E3/UL (ref 0–0.1)
IMM GRANULOCYTES NFR BLD AUTO: 0 %
IRON SERPL-MCNC: 35 UG/DL (ref 27–159)
LYMPHOCYTES # BLD AUTO: 0.7 X10E3/UL (ref 0.7–3.1)
LYMPHOCYTES NFR BLD AUTO: 17 %
MCH RBC QN AUTO: 26.4 PG (ref 26.6–33)
MCHC RBC AUTO-ENTMCNC: 32 G/DL (ref 31.5–35.7)
MCV RBC AUTO: 83 FL (ref 79–97)
MONOCYTES # BLD AUTO: 0.4 X10E3/UL (ref 0.1–0.9)
MONOCYTES NFR BLD AUTO: 9 %
NEUTROPHILS # BLD AUTO: 3.2 X10E3/UL (ref 1.4–7)
NEUTROPHILS NFR BLD AUTO: 72 %
PLATELET # BLD AUTO: 185 X10E3/UL (ref 150–450)
POTASSIUM SERPL-SCNC: 3.9 MMOL/L (ref 3.5–5.2)
PROT SERPL-MCNC: 6.3 G/DL (ref 6–8.5)
RBC # BLD AUTO: 3.9 X10E6/UL (ref 3.77–5.28)
RETICS/RBC NFR AUTO: 1.2 % (ref 0.6–2.6)
SODIUM SERPL-SCNC: 143 MMOL/L (ref 134–144)
TSH SERPL DL<=0.005 MIU/L-ACNC: 0.54 UIU/ML (ref 0.45–4.5)
VIT B12 SERPL-MCNC: 429 PG/ML (ref 232–1245)
WBC # BLD AUTO: 4.4 X10E3/UL (ref 3.4–10.8)

## 2023-12-21 RX ORDER — SUMATRIPTAN 100 MG/1
100 TABLET, FILM COATED ORAL ONCE AS NEEDED
Qty: 9 TABLET | Refills: 5 | Status: SHIPPED | OUTPATIENT
Start: 2023-12-21

## 2023-12-21 NOTE — TELEPHONE ENCOUNTER
Patient having increased headaches and migraines has f/u on 1/25 was wanting to know if she can be seen sooner. Patient had cervical injection in the past and having the same symptoms coming back wants to talk with you about getting another injection. Also needs refill on her Sumatriptan.

## 2024-01-11 RX ORDER — LEVOTHYROXINE SODIUM 0.07 MG/1
75 TABLET ORAL DAILY
Qty: 90 TABLET | Refills: 1 | Status: SHIPPED | OUTPATIENT
Start: 2024-01-11

## 2024-01-25 ENCOUNTER — OFFICE VISIT (OUTPATIENT)
Dept: PAIN MEDICINE | Facility: CLINIC | Age: 59
End: 2024-01-25
Payer: COMMERCIAL

## 2024-01-25 VITALS
SYSTOLIC BLOOD PRESSURE: 147 MMHG | OXYGEN SATURATION: 99 % | HEART RATE: 72 BPM | BODY MASS INDEX: 19.19 KG/M2 | DIASTOLIC BLOOD PRESSURE: 81 MMHG | RESPIRATION RATE: 16 BRPM | WEIGHT: 126.2 LBS

## 2024-01-25 DIAGNOSIS — M47.812 CERVICAL SPONDYLOSIS WITHOUT MYELOPATHY: ICD-10-CM

## 2024-01-25 DIAGNOSIS — G43.709 CHRONIC MIGRAINE WITHOUT AURA WITHOUT STATUS MIGRAINOSUS, NOT INTRACTABLE: ICD-10-CM

## 2024-01-25 DIAGNOSIS — M54.2 CERVICALGIA: Primary | ICD-10-CM

## 2024-01-25 DIAGNOSIS — R51.9 CHRONIC DAILY HEADACHE: ICD-10-CM

## 2024-01-25 DIAGNOSIS — M48.02 NEUROFORAMINAL STENOSIS OF CERVICAL SPINE: ICD-10-CM

## 2024-01-25 DIAGNOSIS — M54.12 CERVICAL RADICULOPATHY: ICD-10-CM

## 2024-01-25 DIAGNOSIS — M25.50 PAIN IN JOINT INVOLVING MULTIPLE SITES: ICD-10-CM

## 2024-01-25 RX ORDER — HYDROCODONE BITARTRATE AND ACETAMINOPHEN 10; 300 MG/1; MG/1
1 TABLET ORAL EVERY 6 HOURS PRN
Qty: 120 TABLET | Refills: 0 | Status: SHIPPED | OUTPATIENT
Start: 2024-01-25

## 2024-01-25 RX ORDER — HYDROCODONE BITARTRATE AND ACETAMINOPHEN 10; 325 MG/1; MG/1
1 TABLET ORAL EVERY 6 HOURS PRN
Qty: 120 TABLET | Refills: 0 | Status: SHIPPED | OUTPATIENT
Start: 2024-01-25

## 2024-01-25 NOTE — PROGRESS NOTES
Subjective   Tasneem Valentino is a 59 y.o. female.     History of Present Illness  Chronic daily headaches, also widespread joint pain with SLE, 10/10 at worst, 1/10 at best, always present, varies, aching, stabbing, worse with weather changes, interferes with ADLs, sleep, failed chiropractor, PT, massage, occipital neurotomy, headaches stim trial. MRI brain wnl. Prior notes reviewed, as above, was seeing Dr. Laguerre with Norco 10mg QID prn and Imitrex 100mg #9/month with some relief, also started Amovig 70mg, uses Zofran 4mg prn. No FH of substance abuse. 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 Had 3 cervical ESIs, symptoms essentially resolved after 1st, improved after 2nd, weakness worsening with a lot of lifting at work. Getting out of breath with any exercise, dx with COPD and asthma but nonsmoker, being worked up, had unremarkable 2D echo and Holter monitor, going to see Neuro, off work on FMLA for now, Neuro dx her with seizures with abnormal EEG, increased Topamax used for migraine prophylaxis now also controlling seizures, began Zonisamide also.   Neck Pain   Associated symptoms include headaches. Pertinent negatives include no chest pain, fever, numbness, trouble swallowing or weakness.   Headache       The following portions of the patient's history were reviewed and updated as appropriate: allergies, current medications, past family history, past medical history, past social history, past surgical history and problem list.    Review of Systems   Constitutional:  Negative for chills, fatigue and fever.   HENT:  Positive for hearing loss. Negative for trouble swallowing.    Eyes:  Positive for visual disturbance.   Respiratory:  Negative for shortness of breath.    Cardiovascular:  Negative for chest pain.   Gastrointestinal:  Positive for constipation. Negative for abdominal pain, diarrhea, nausea and vomiting.    Genitourinary:  Negative for urinary incontinence.   Musculoskeletal:  Positive for neck pain. Negative for arthralgias, back pain, joint swelling and myalgias.   Neurological:  Positive for dizziness and headache. Negative for weakness and numbness.       Objective   Physical Exam   Constitutional: She is oriented to person, place, and time. She appears well-developed and well-nourished.   HENT:   Head: Normocephalic and atraumatic.   Eyes: Pupils are equal, round, and reactive to light.   Cardiovascular: Normal rate and regular rhythm.   Murmur heard.  Pulmonary/Chest: Breath sounds normal.   Abdominal: Soft. Bowel sounds are normal. She exhibits no distension. There is no abdominal tenderness.   Neurological: She is alert and oriented to person, place, and time. She has normal reflexes. She displays normal reflexes. No sensory deficit.   Psychiatric: Her behavior is normal. Thought content normal.         Assessment & Plan   Diagnoses and all orders for this visit:    1. Cervicalgia (Primary)    2. Cervical spondylosis without myelopathy    3. Cervical radiculopathy    4. Chronic daily headache    5. Chronic migraine without aura without status migrainosus, not intractable    6. Neuroforaminal stenosis of cervical spine    7. Pain in joint involving multiple sites        UDS in order 10/19/23. Inspect reviewed, in order.  Treatment plan will consist of continuing current medication as long as it remains effective and is necessary, while evaluating patient at each visit and determining if the medication can be lowered or discontinued, while also using nonopioid therapies to reduce reliance on opioids.  Cont Norco 10mg QID prn, Imitrex 100mg #9. Filled Norco 1/16/24 per new Inspect.  Began Zofran 4mg TID prn.  Had 3 cervical ESIs per MRI with nearly 100% resolution of radicular symptoms. No contrast per allergy. Some discomfort with left paramedian approach, will perform on right in future. Schedule  repeat.  Failed headache procedures. Refer to Dr. Seiple for headaches.  RTC for LUPILLO then in 3 months for f/u.

## 2024-01-29 ENCOUNTER — OFFICE VISIT (OUTPATIENT)
Dept: FAMILY MEDICINE CLINIC | Facility: CLINIC | Age: 59
End: 2024-01-29
Payer: COMMERCIAL

## 2024-01-29 VITALS
BODY MASS INDEX: 19.37 KG/M2 | SYSTOLIC BLOOD PRESSURE: 155 MMHG | HEART RATE: 72 BPM | HEIGHT: 68 IN | OXYGEN SATURATION: 98 % | TEMPERATURE: 97.7 F | RESPIRATION RATE: 18 BRPM | WEIGHT: 127.8 LBS | DIASTOLIC BLOOD PRESSURE: 82 MMHG

## 2024-01-29 DIAGNOSIS — Z12.31 ENCOUNTER FOR SCREENING MAMMOGRAM FOR MALIGNANT NEOPLASM OF BREAST: ICD-10-CM

## 2024-01-29 DIAGNOSIS — R42 DIZZINESS: Primary | ICD-10-CM

## 2024-01-29 PROCEDURE — 99213 OFFICE O/P EST LOW 20 MIN: CPT | Performed by: NURSE PRACTITIONER

## 2024-01-29 NOTE — PROGRESS NOTES
"Chief Complaint  Seizures and Dizziness        Tasneem Valentino presents to [facility]        Subjective      59-year-old female patient presents today to follow-up on seizures and dizziness.    Has been with dizziness for the past week. She didn't feel well today and sat down at home prior to visit.  Not dizzy currently however it is positional. She has underlying hx of Ménière's. She denies any seizure activity since September 19, 2023.     1/25/2024 saw pain management Dr. Epps and no change in plan. Dr. Epps referred pt to Dr. Seipel is following her for headaches as they continue. She does not have an appointment yet but trying to get one. Will see again next month 2/6/24.     Does follow Hetal TORREZ. Has not seen in a while due to neurological status. Pat due for pap.  She is due for her mammogram.    Saw Dr. Gandhi with pulmonology and had PFT's completed. Will request records. She saw Dr. Tarik Parnell with rheumatology and completed some labs last week but does not have those back. She follows again in March 25, 2024.                     Objective         Vital Signs:     /82 (BP Location: Right arm, Patient Position: Sitting, Cuff Size: Adult)   Pulse 72   Temp 97.7 °F (36.5 °C) (Infrared)   Resp 18   Ht 172.7 cm (67.99\")   Wt 58 kg (127 lb 12.8 oz)   SpO2 98%   BMI 19.44 kg/m²       Physical Exam  Vitals reviewed.   Constitutional:       Appearance: She is well-developed.      Comments:      HENT:      Head: Normocephalic and atraumatic.      Right Ear: Tympanic membrane, ear canal and external ear normal.      Left Ear: Tympanic membrane, ear canal and external ear normal.      Nose: Nose normal.      Mouth/Throat:      Mouth: Mucous membranes are moist.      Pharynx: Oropharynx is clear.   Eyes:      Conjunctiva/sclera: Conjunctivae normal.      Pupils: Pupils are equal, round, and reactive to light.   Cardiovascular:      Rate and Rhythm: Normal " rate and regular rhythm.      Pulses: Normal pulses.      Heart sounds: Normal heart sounds.   Pulmonary:      Effort: Pulmonary effort is normal. No respiratory distress.      Breath sounds: Normal breath sounds. No stridor. No wheezing, rhonchi or rales.   Chest:      Chest wall: No tenderness.   Musculoskeletal:         General: Normal range of motion.      Cervical back: Normal range of motion.   Skin:     General: Skin is warm and dry.      Findings: No rash.   Neurological:      Mental Status: She is alert and oriented to person, place, and time.   Psychiatric:         Behavior: Behavior normal.                History of Present Illness      Patient Active Problem List   Diagnosis    Chronic daily headache    Intractable chronic migraine without aura    Lupus (systemic lupus erythematosus)    Pain in joint involving multiple sites    Anemia    Anxiety    Migraine    Meniere's disease    Hypothyroidism    Depressive disorder    Coronary artery spasm    Aortic insufficiency    Hypertension    History of Lyme disease    Kidney disease, chronic, stage II (GFR 60-89 ml/min)    Nodule of skin of left upper extremity    Weakness of right upper extremity    Vitamin D deficiency    History of renal stone    Nausea    Cervicalgia    Cervical spondylosis without myelopathy    Neuroforaminal stenosis of cervical spine    Cervical radiculopathy    Hoarseness of voice    Screen for colon cancer    Essential hypertension    Acute non-recurrent maxillary sinusitis    SOB (shortness of breath) on exertion    Moderate persistent asthma without complication    Solitary lung nodule    Hoarse voice quality    Medication refill    Sore throat    MELTON (dyspnea on exertion)    Multiple pulmonary nodules    Intermittent lightheadedness    Dizziness    Partial symptomatic epilepsy with complex partial seizures, not intractable, without status epilepticus    Screening, lipid    Stomach ache    Stress at home    Generalized abdominal pain     Abdominal pain    Other fatigue    Gastritis without bleeding    Encounter for screening mammogram for malignant neoplasm of breast         Past Medical History:   Diagnosis Date    Acute non-recurrent maxillary sinusitis 05/11/2022    Allergic     Anemia     Asthma     COVID-19 07/28/2021    COVID-19 05/21/2022    Epilepsy     Headache     Heart disease     HL (hearing loss)     Hypertension     Hypothyroidism     Kidney disease     Lupus     Migraine     Renal insufficiency     Seizures     Sjogren's disease     Solitary lung nodule 09/27/2022    Thyroid disease     Tremor     Upper respiratory tract infection 11/08/2021          Family History   Problem Relation Age of Onset    Hypertension Mother     Cancer Father     Arthritis Sister     Heart disease Brother     No Known Problems Brother     No Known Problems Brother           Past Surgical History:   Procedure Laterality Date    APPENDECTOMY      BRAIN SURGERY      occipital Neurotomy    CARDIAC CATHETERIZATION      CHOLECYSTECTOMY      ENDOSCOPY N/A 10/24/2023    Procedure: ESOPHAGOGASTRODUODENOSCOPY WITH BIOPSY X1 AREA;  Surgeon: Wesley Davies MD;  Location: [facility] ENDOSCOPY;  Service: Gastroenterology;  Laterality: N/A;  POST: GASTRITIS    OCCIPITAL NEURECTOMY  2010    OVARY SURGERY      SINUS SURGERY            Social History     Socioeconomic History    Marital status:    Tobacco Use    Smoking status: Never     Passive exposure: Never    Smokeless tobacco: Never   Vaping Use    Vaping Use: Never used   Substance and Sexual Activity    Alcohol use: No    Drug use: Never    Sexual activity: Defer                    Result Review :                                  BMI is within normal parameters. No other follow-up for BMI required.               Assessment and Plan      Diagnoses and all orders for this visit:    1. Dizziness (Primary)    2. Encounter for screening mammogram for malignant neoplasm of breast  -     Mammo Screening  Bilateral With CAD; Future      Patient with some dizziness today but could be underlying to Ménière's disease.  TMs are normal on exam today.  She has not had any seizure activity since September.  Last saw neurology Dr. Santiago November 27, 2023.  Has not had any seizure activity since prior to November.  Neurology discontinued the topiramate because of the kidney stone history at that visit as well as the lacosamide because of GI pain. No prophylactic treatment was continued neurology is awaiting for another episode and patient to call office if that occurs.     Will obtain records from pulmonology for PFT results, rheumatology for recent appointment, OB/GYN.  Ordering mammogram as patient is past due.  [facility]  Will follow patient in 3 months since condition is stable and no significant changes or episodes of seizure activity.  She is to continue following with Dr. Seipel and Dr. Santiago with neurology.                         Follow Up       Return in about 3 months (around 4/29/2024) for with FORREST Arboleda.      Patient was given instructions and counseling regarding her condition or for health maintenance advice. Please see specific information pulled into the AVS if appropriate.     Piper Levine, APRN1/29/202410:36 EST  This note has been electronically signed

## 2024-02-06 ENCOUNTER — HOSPITAL ENCOUNTER (OUTPATIENT)
Dept: PAIN MEDICINE | Facility: HOSPITAL | Age: 59
Discharge: HOME | End: 2024-02-06
Payer: COMMERCIAL

## 2024-02-06 VITALS
SYSTOLIC BLOOD PRESSURE: 132 MMHG | OXYGEN SATURATION: 99 % | HEIGHT: 68 IN | WEIGHT: 127 LBS | TEMPERATURE: 97.1 F | RESPIRATION RATE: 16 BRPM | BODY MASS INDEX: 19.25 KG/M2 | DIASTOLIC BLOOD PRESSURE: 75 MMHG | HEART RATE: 69 BPM

## 2024-02-06 DIAGNOSIS — M54.12 CERVICAL RADICULOPATHY: ICD-10-CM

## 2024-02-06 DIAGNOSIS — R52 PAIN: ICD-10-CM

## 2024-02-06 DIAGNOSIS — M54.2 CERVICALGIA: Primary | ICD-10-CM

## 2024-02-06 PROCEDURE — 77003 FLUOROGUIDE FOR SPINE INJECT: CPT

## 2024-02-06 PROCEDURE — 25010000002 DEXAMETHASONE SODIUM PHOSPHATE 10 MG/ML SOLUTION: Performed by: PHYSICAL MEDICINE & REHABILITATION

## 2024-02-06 PROCEDURE — 62321 NJX INTERLAMINAR CRV/THRC: CPT | Performed by: PHYSICAL MEDICINE & REHABILITATION

## 2024-02-06 RX ORDER — DEXAMETHASONE SODIUM PHOSPHATE 10 MG/ML
10 INJECTION, SOLUTION INTRAMUSCULAR; INTRAVENOUS ONCE
Status: COMPLETED | OUTPATIENT
Start: 2024-02-06 | End: 2024-02-06

## 2024-02-06 RX ADMIN — DEXAMETHASONE SODIUM PHOSPHATE 10 MG: 10 INJECTION, SOLUTION INTRAMUSCULAR; INTRAVENOUS at 09:34

## 2024-02-06 NOTE — DISCHARGE INSTRUCTIONS

## 2024-02-07 ENCOUNTER — TELEPHONE (OUTPATIENT)
Dept: PAIN MEDICINE | Facility: HOSPITAL | Age: 59
End: 2024-02-07
Payer: COMMERCIAL

## 2024-02-07 NOTE — TELEPHONE ENCOUNTER
Post procedure phone call completed.  Pt states they are doing good and denies questions or concerns. Pain #5

## 2024-02-14 ENCOUNTER — OFFICE (OUTPATIENT)
Dept: URBAN - METROPOLITAN AREA CLINIC 64 | Facility: CLINIC | Age: 59
End: 2024-02-14

## 2024-02-14 VITALS
WEIGHT: 129 LBS | HEIGHT: 68 IN | HEART RATE: 73 BPM | SYSTOLIC BLOOD PRESSURE: 136 MMHG | DIASTOLIC BLOOD PRESSURE: 81 MMHG

## 2024-02-14 DIAGNOSIS — R10.13 EPIGASTRIC PAIN: ICD-10-CM

## 2024-02-14 DIAGNOSIS — Z12.11 ENCOUNTER FOR SCREENING FOR MALIGNANT NEOPLASM OF COLON: ICD-10-CM

## 2024-02-14 PROCEDURE — 99213 OFFICE O/P EST LOW 20 MIN: CPT | Performed by: NURSE PRACTITIONER

## 2024-02-22 RX ORDER — SUMATRIPTAN 100 MG/1
TABLET, FILM COATED ORAL
Qty: 9 TABLET | Refills: 5 | OUTPATIENT
Start: 2024-02-22

## 2024-03-18 RX ORDER — SUMATRIPTAN 100 MG/1
TABLET, FILM COATED ORAL
Qty: 9 TABLET | Refills: 5 | OUTPATIENT
Start: 2024-03-18

## 2024-03-18 RX ORDER — SUMATRIPTAN 100 MG/1
100 TABLET, FILM COATED ORAL ONCE AS NEEDED
Qty: 9 TABLET | Refills: 0 | Status: SHIPPED | OUTPATIENT
Start: 2024-03-18

## 2024-04-17 ENCOUNTER — ON CAMPUS - OUTPATIENT (OUTPATIENT)
Dept: RURAL HOSPITAL 3 | Facility: HOSPITAL | Age: 59
End: 2024-04-17

## 2024-04-17 DIAGNOSIS — Z53.8 PROCEDURE AND TREATMENT NOT CARRIED OUT FOR OTHER REASONS: ICD-10-CM

## 2024-04-17 PROCEDURE — 45330 DIAGNOSTIC SIGMOIDOSCOPY: CPT | Performed by: INTERNAL MEDICINE

## 2024-04-29 ENCOUNTER — OFFICE VISIT (OUTPATIENT)
Dept: FAMILY MEDICINE CLINIC | Facility: CLINIC | Age: 59
End: 2024-04-29
Payer: COMMERCIAL

## 2024-04-29 VITALS
HEIGHT: 68 IN | DIASTOLIC BLOOD PRESSURE: 88 MMHG | OXYGEN SATURATION: 100 % | HEART RATE: 74 BPM | WEIGHT: 127 LBS | BODY MASS INDEX: 19.25 KG/M2 | SYSTOLIC BLOOD PRESSURE: 143 MMHG | TEMPERATURE: 97.7 F

## 2024-04-29 DIAGNOSIS — E55.9 VITAMIN D DEFICIENCY: ICD-10-CM

## 2024-04-29 DIAGNOSIS — G40.209 PARTIAL SYMPTOMATIC EPILEPSY WITH COMPLEX PARTIAL SEIZURES, NOT INTRACTABLE, WITHOUT STATUS EPILEPTICUS: ICD-10-CM

## 2024-04-29 DIAGNOSIS — E03.9 ACQUIRED HYPOTHYROIDISM: ICD-10-CM

## 2024-04-29 DIAGNOSIS — I10 PRIMARY HYPERTENSION: Primary | ICD-10-CM

## 2024-04-29 DIAGNOSIS — R59.0 ENLARGED LYMPH NODE IN NECK: ICD-10-CM

## 2024-04-29 PROBLEM — R10.9 ABDOMINAL PAIN: Status: RESOLVED | Noted: 2023-10-23 | Resolved: 2024-04-29

## 2024-04-29 PROBLEM — Z12.31 ENCOUNTER FOR SCREENING MAMMOGRAM FOR MALIGNANT NEOPLASM OF BREAST: Status: RESOLVED | Noted: 2024-01-29 | Resolved: 2024-04-29

## 2024-04-29 PROBLEM — J01.00 ACUTE NON-RECURRENT MAXILLARY SINUSITIS: Status: RESOLVED | Noted: 2022-05-11 | Resolved: 2024-04-29

## 2024-04-29 PROBLEM — R06.09 DOE (DYSPNEA ON EXERTION): Status: RESOLVED | Noted: 2023-01-26 | Resolved: 2024-04-29

## 2024-04-29 PROBLEM — Z12.11 SCREEN FOR COLON CANCER: Status: RESOLVED | Noted: 2022-02-10 | Resolved: 2024-04-29

## 2024-04-29 PROBLEM — Z76.0 MEDICATION REFILL: Status: RESOLVED | Noted: 2022-10-28 | Resolved: 2024-04-29

## 2024-04-29 PROBLEM — R91.1 SOLITARY LUNG NODULE: Status: RESOLVED | Noted: 2022-09-27 | Resolved: 2024-04-29

## 2024-04-29 PROBLEM — R49.0 HOARSENESS OF VOICE: Status: RESOLVED | Noted: 2021-11-08 | Resolved: 2024-04-29

## 2024-04-29 PROBLEM — R10.9 STOMACH ACHE: Status: RESOLVED | Noted: 2023-09-11 | Resolved: 2024-04-29

## 2024-04-29 PROBLEM — J02.9 SORE THROAT: Status: RESOLVED | Noted: 2022-10-28 | Resolved: 2024-04-29

## 2024-04-29 PROBLEM — R10.84 GENERALIZED ABDOMINAL PAIN: Status: RESOLVED | Noted: 2023-10-23 | Resolved: 2024-04-29

## 2024-04-29 PROCEDURE — 99214 OFFICE O/P EST MOD 30 MIN: CPT | Performed by: NURSE PRACTITIONER

## 2024-04-29 RX ORDER — PREDNISONE 5 MG/1
1 TABLET ORAL DAILY
COMMUNITY
Start: 2024-04-25

## 2024-04-29 RX ORDER — LINACLOTIDE 290 UG/1
290 CAPSULE, GELATIN COATED ORAL EVERY MORNING
COMMUNITY
Start: 2024-04-17

## 2024-04-29 RX ORDER — LEVETIRACETAM 500 MG/1
TABLET, FILM COATED ORAL
COMMUNITY
Start: 2024-04-15

## 2024-04-29 NOTE — PROGRESS NOTES
"Chief Complaint  Hypertension, Hypothyroidism, and Adenopathy        Tasneem Valentino presents to [facility] INTER[facility] MEDIC[facility]        Subjective      59-year-old female patient presents today to follow-up on chronic medical issues.  Accompanied with . Tasneem gives permission to discuss today's visit & treatment plan with accompanied person.    PMH hypertension, SOB, aortic insufficiency, Ménière's, hypothyroidism, vitamin D deficiency, CKD stage III, anxiety, lupus, migraines, epilepsy.    Saw Dr. Santiago end of March after seizure and started carbamazepine  nightly however could not tolerate due to side effects.  That was discontinued and she wasplaced on Keppra twice daily. Tolerating well and without sedation or other side effects.  Denies any seizure activity.  She has an upcoming appointment with him in two weeks 5/13/24.  She does continue the sumatriptan.    Saw  for lupus follow-up rheumatology.  Labs were completed.  Patient was advised to follow up with PCP for ultrasound for enlarged lymph nodes in the neck region. Left tonsillar most enlarged per patient.  Nontender.  Rheumatology will  see again in 6-8 weeks.    She has been walking in the evenings to help with her pulmonary function.    Mammogram was completed 3/12/2024 normal.    Continues to follow Dr. Epps with pain management.  Last appointment was January no changes to treatment plan.  Upcoming appointment in May.                    Objective         Vital Signs:     /88 (BP Location: Right arm, Patient Position: Sitting, Cuff Size: Adult)   Pulse 74   Temp 97.7 °F (36.5 °C) (Infrared)   Ht 172.7 cm (67.99\")   Wt 57.6 kg (127 lb)   SpO2 100%   BMI 19.31 kg/m²       Physical Exam  Vitals reviewed.   Constitutional:       Appearance: She is well-developed.      Comments:      HENT:      Head: Normocephalic and atraumatic.      Nose: Nose normal.      Mouth/Throat:      Mouth: Mucous membranes are moist.      " Pharynx: Oropharynx is clear.   Eyes:      Conjunctiva/sclera: Conjunctivae normal.      Pupils: Pupils are equal, round, and reactive to light.   Cardiovascular:      Rate and Rhythm: Normal rate and regular rhythm.      Pulses: Normal pulses.      Heart sounds: Normal heart sounds.   Pulmonary:      Effort: Pulmonary effort is normal. No respiratory distress.      Breath sounds: Normal breath sounds. No stridor. No wheezing, rhonchi or rales.   Chest:      Chest wall: No tenderness.   Musculoskeletal:         General: Normal range of motion.      Cervical back: Normal range of motion.   Lymphadenopathy:      Head:      Right side of head: Tonsillar adenopathy present.      Left side of head: Tonsillar adenopathy present.      Comments: Left tonsillar lymph node soft nontender mobile approximately 8 mm round.  Right tonsillar similar in characteristics approximately 5 mm round.   Skin:     General: Skin is warm and dry.      Findings: No rash.   Neurological:      Mental Status: She is alert and oriented to person, place, and time.   Psychiatric:         Behavior: Behavior normal.                History of Present Illness      Patient Active Problem List   Diagnosis    Chronic daily headache    Intractable chronic migraine without aura    Lupus (systemic lupus erythematosus)    Pain in joint involving multiple sites    Anemia    Anxiety    Migraine    Meniere's disease    Hypothyroidism    Depressive disorder    Coronary artery spasm    Aortic insufficiency    Hypertension    History of Lyme disease    Kidney disease, chronic, stage II (GFR 60-89 ml/min)    Nodule of skin of left upper extremity    Weakness of right upper extremity    Vitamin D deficiency    History of renal stone    Nausea    Cervicalgia    Cervical spondylosis without myelopathy    Neuroforaminal stenosis of cervical spine    Cervical radiculopathy    SOB (shortness of breath) on exertion    Moderate persistent asthma without complication     Hoarse voice quality    Multiple pulmonary nodules    Intermittent lightheadedness    Dizziness    Partial symptomatic epilepsy with complex partial seizures, not intractable, without status epilepticus    Screening, lipid    Stress at home    Other fatigue    Gastritis without bleeding    Enlarged lymph node in neck         Past Medical History:   Diagnosis Date    Acute non-recurrent maxillary sinusitis 05/11/2022    Allergic     Anemia     Asthma     COVID-19 07/28/2021    COVID-19 05/21/2022    MELTON (dyspnea on exertion) 01/26/2023    Epilepsy     Essential hypertension 05/11/2022    Generalized abdominal pain 10/23/2023    Headache     Heart disease     HL (hearing loss)     Hoarseness of voice 11/08/2021    Hypertension     Hypothyroidism     Kidney disease     Lupus     Migraine     Renal insufficiency     Screen for colon cancer 02/10/2022    Seizures     Sjogren's disease     Solitary lung nodule 09/27/2022    Sore throat 10/28/2022    Stomach ache 09/11/2023    Thyroid disease     Tremor     Upper respiratory tract infection 11/08/2021          Family History   Problem Relation Age of Onset    Hypertension Mother     Cancer Father     Arthritis Sister     Heart disease Brother     No Known Problems Brother     No Known Problems Brother           Past Surgical History:   Procedure Laterality Date    APPENDECTOMY      BRAIN SURGERY      occipital Neurotomy    CARDIAC CATHETERIZATION      CHOLECYSTECTOMY      ENDOSCOPY N/A 10/24/2023    Procedure: ESOPHAGOGASTRODUODENOSCOPY WITH BIOPSY X1 AREA;  Surgeon: Wesley Davies MD;  Location: [facility] ENDOSCOPY;  Service: Gastroenterology;  Laterality: N/A;  POST: GASTRITIS    OCCIPITAL NEURECTOMY  2010    OVARY SURGERY      SINUS SURGERY            Social History     Socioeconomic History    Marital status:    Tobacco Use    Smoking status: Never     Passive exposure: Never    Smokeless tobacco: Never   Vaping Use    Vaping status: Never Used   Substance  and Sexual Activity    Alcohol use: No    Drug use: Never    Sexual activity: Defer                    Result Review :                                  BMI is within normal parameters. No other follow-up for BMI required.               Assessment and Plan      Diagnoses and all orders for this visit:    1. Primary hypertension (Primary)  -     CBC & Differential  -     Comprehensive metabolic panel    2. Acquired hypothyroidism  -     TSH    3. Vitamin D deficiency  -     Vitamin D,25-Hydroxy    4. Enlarged lymph node in neck  -     US Head Neck Soft Tissue; Future    5. Partial symptomatic epilepsy with complex partial seizures, not intractable, without status epilepticus      Patient due for labs today we will check the above.  Blood pressure looks good.  No dysphagia.  She is with bilateral tonsillar lymphadenopathy left larger than right.  Nodes are mobile, nontender, round.  Left tonsillar, approximately 8 mm in size.  Will go ahead and order soft tissue neck ultrasound to evaluate.  Most likely due to underlying lupus and environmental seasonal allergies.  We will await results and notify patient.  She is to continue following with neurology                  Follow Up       No follow-ups on file.      Patient was given instructions and counseling regarding her condition or for health maintenance advice. Please see specific information pulled into the AVS if appropriate.     Piper Levine, [id number]/29/202408:[id number] EDT  This note has been electronically signed

## 2024-04-30 LAB
25(OH)D3+25(OH)D2 SERPL-MCNC: 37.3 NG/ML (ref 30–100)
ALBUMIN SERPL-MCNC: 4.4 G/DL (ref 3.8–4.9)
ALBUMIN/GLOB SERPL: 1.8 {RATIO} (ref 1.2–2.2)
ALP SERPL-CCNC: 149 IU/L (ref 44–121)
ALT SERPL-CCNC: 17 IU/L (ref 0–32)
AST SERPL-CCNC: 19 IU/L (ref 0–40)
BASOPHILS # BLD AUTO: 0 X10E3/UL (ref 0–0.2)
BASOPHILS NFR BLD AUTO: 1 %
BILIRUB SERPL-MCNC: 0.3 MG/DL (ref 0–1.2)
BUN SERPL-MCNC: 18 MG/DL (ref 6–24)
BUN/CREAT SERPL: 20 (ref 9–23)
CALCIUM SERPL-MCNC: 9.9 MG/DL (ref 8.7–10.2)
CHLORIDE SERPL-SCNC: 104 MMOL/L (ref 96–106)
CO2 SERPL-SCNC: 26 MMOL/L (ref 20–29)
CREAT SERPL-MCNC: 0.92 MG/DL (ref 0.57–1)
EGFRCR SERPLBLD CKD-EPI 2021: 72 ML/MIN/1.73
EOSINOPHIL # BLD AUTO: 0.1 X10E3/UL (ref 0–0.4)
EOSINOPHIL NFR BLD AUTO: 2 %
ERYTHROCYTE [DISTWIDTH] IN BLOOD BY AUTOMATED COUNT: 14.2 % (ref 11.7–15.4)
GLOBULIN SER CALC-MCNC: 2.4 G/DL (ref 1.5–4.5)
GLUCOSE SERPL-MCNC: 83 MG/DL (ref 70–99)
HCT VFR BLD AUTO: 38.7 % (ref 34–46.6)
HGB BLD-MCNC: 12 G/DL (ref 11.1–15.9)
IMM GRANULOCYTES # BLD AUTO: 0 X10E3/UL (ref 0–0.1)
IMM GRANULOCYTES NFR BLD AUTO: 1 %
LYMPHOCYTES # BLD AUTO: 0.8 X10E3/UL (ref 0.7–3.1)
LYMPHOCYTES NFR BLD AUTO: 23 %
MCH RBC QN AUTO: 26.6 PG (ref 26.6–33)
MCHC RBC AUTO-ENTMCNC: 31 G/DL (ref 31.5–35.7)
MCV RBC AUTO: 86 FL (ref 79–97)
MONOCYTES # BLD AUTO: 0.5 X10E3/UL (ref 0.1–0.9)
MONOCYTES NFR BLD AUTO: 15 %
NEUTROPHILS # BLD AUTO: 2.1 X10E3/UL (ref 1.4–7)
NEUTROPHILS NFR BLD AUTO: 58 %
PLATELET # BLD AUTO: 188 X10E3/UL (ref 150–450)
POTASSIUM SERPL-SCNC: 4.3 MMOL/L (ref 3.5–5.2)
PROT SERPL-MCNC: 6.8 G/DL (ref 6–8.5)
RBC # BLD AUTO: 4.51 X10E6/UL (ref 3.77–5.28)
SODIUM SERPL-SCNC: 141 MMOL/L (ref 134–144)
TSH SERPL DL<=0.005 MIU/L-ACNC: 1.01 UIU/ML (ref 0.45–4.5)
WBC # BLD AUTO: 3.5 X10E3/UL (ref 3.4–10.8)

## 2024-05-02 ENCOUNTER — OFFICE VISIT (OUTPATIENT)
Dept: PAIN MEDICINE | Facility: CLINIC | Age: 59
End: 2024-05-02
Payer: COMMERCIAL

## 2024-05-02 VITALS
RESPIRATION RATE: 16 BRPM | BODY MASS INDEX: 19.62 KG/M2 | WEIGHT: 129 LBS | SYSTOLIC BLOOD PRESSURE: 141 MMHG | DIASTOLIC BLOOD PRESSURE: 83 MMHG | HEART RATE: 61 BPM | OXYGEN SATURATION: 99 %

## 2024-05-02 DIAGNOSIS — M25.50 PAIN IN JOINT INVOLVING MULTIPLE SITES: ICD-10-CM

## 2024-05-02 DIAGNOSIS — M47.812 CERVICAL SPONDYLOSIS WITHOUT MYELOPATHY: ICD-10-CM

## 2024-05-02 DIAGNOSIS — M48.02 NEUROFORAMINAL STENOSIS OF CERVICAL SPINE: ICD-10-CM

## 2024-05-02 DIAGNOSIS — M54.2 CERVICALGIA: Primary | ICD-10-CM

## 2024-05-02 DIAGNOSIS — G43.719 INTRACTABLE CHRONIC MIGRAINE WITHOUT AURA AND WITHOUT STATUS MIGRAINOSUS: ICD-10-CM

## 2024-05-02 DIAGNOSIS — M54.12 CERVICAL RADICULOPATHY: ICD-10-CM

## 2024-05-02 RX ORDER — HYDROCODONE BITARTRATE AND ACETAMINOPHEN 10; 325 MG/1; MG/1
1 TABLET ORAL EVERY 6 HOURS PRN
Qty: 120 TABLET | Refills: 0 | Status: SHIPPED | OUTPATIENT
Start: 2024-05-02

## 2024-05-02 NOTE — PROGRESS NOTES
Subjective   Tasneem Valentino is a 59 y.o. female.     History of Present Illness  Chronic daily headaches, also widespread joint pain with SLE, 10/10 at worst, 1/10 at best, always present, varies, aching, stabbing, worse with weather changes, interferes with ADLs, sleep, failed chiropractor, PT, massage, occipital neurotomy, headaches stim trial. MRI brain wnl. Prior notes reviewed, as above, was seeing Dr. Laguerre with Norco 10mg QID prn and Imitrex 100mg #9/month with some relief, also started Amovig 70mg, uses Zofran 4mg prn. No FH of substance abuse. 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 Had 3 cervical ESIs, symptoms essentially resolved after 1st, improved after 2nd, weakness worsening with a lot of lifting at work. Getting out of breath with any exercise, dx with COPD and asthma but nonsmoker, being worked up, had unremarkable 2D echo and Holter monitor, going to see Neuro, off work on FMLA for now, Neuro dx her with seizures with abnormal EEG, increased Topamax used for migraine prophylaxis now also controlling seizures, began Zonisamide also.   Neck Pain   Associated symptoms include headaches. Pertinent negatives include no chest pain, fever, numbness, trouble swallowing or weakness.   Headache       The following portions of the patient's history were reviewed and updated as appropriate: allergies, current medications, past family history, past medical history, past social history, past surgical history and problem list.    Review of Systems   Constitutional:  Negative for chills, fatigue and fever.   HENT:  Positive for hearing loss. Negative for trouble swallowing.    Eyes:  Positive for visual disturbance.   Respiratory:  Negative for shortness of breath.    Cardiovascular:  Negative for chest pain.   Gastrointestinal:  Positive for constipation. Negative for abdominal pain, diarrhea, nausea and vomiting.    Genitourinary:  Negative for urinary incontinence.   Musculoskeletal:  Positive for neck pain. Negative for arthralgias, back pain, joint swelling and myalgias.   Neurological:  Positive for dizziness and headache. Negative for weakness and numbness.       Objective   Physical Exam   Constitutional: She is oriented to person, place, and time. She appears well-developed and well-nourished.   HENT:   Head: Normocephalic and atraumatic.   Eyes: Pupils are equal, round, and reactive to light.   Cardiovascular: Normal rate and regular rhythm.   Murmur heard.  Pulmonary/Chest: Breath sounds normal.   Abdominal: Soft. Bowel sounds are normal. She exhibits no distension. There is no abdominal tenderness.   Neurological: She is alert and oriented to person, place, and time. She has normal reflexes. She displays normal reflexes. No sensory deficit.   Psychiatric: Her behavior is normal. Thought content normal.         Assessment & Plan   Diagnoses and all orders for this visit:    1. Cervicalgia (Primary)    2. Cervical radiculopathy    3. Cervical spondylosis without myelopathy    4. Intractable chronic migraine without aura and without status migrainosus    5. Neuroforaminal stenosis of cervical spine    6. Pain in joint involving multiple sites        UDS in order 10/19/23. Inspect reviewed, in order.  Treatment plan will consist of continuing current medication as long as it remains effective and is necessary, while evaluating patient at each visit and determining if the medication can be lowered or discontinued, while also using nonopioid therapies to reduce reliance on opioids.  Cont Norco 10mg QID prn, Imitrex 100mg #9. Filled Norco 3/29/24 per new Inspect.  Began Zofran 4mg TID prn.  Had 3 cervical ESIs per MRI with nearly 100% resolution of radicular symptoms. No contrast per allergy. Some discomfort with left paramedian approach, will perform on right in future. Performed repeat with similar relief still.  Failed  headache procedures. Referred to Dr. Seiple for headaches, seeing Dr. Santiago, started Keppra.  RTC in 3 months for f/u.

## 2024-05-28 RX ORDER — LEVOTHYROXINE SODIUM 0.07 MG/1
75 TABLET ORAL DAILY
Qty: 90 TABLET | Refills: 1 | Status: SHIPPED | OUTPATIENT
Start: 2024-05-28

## 2024-07-29 ENCOUNTER — OFFICE VISIT (OUTPATIENT)
Dept: FAMILY MEDICINE CLINIC | Facility: CLINIC | Age: 59
End: 2024-07-29
Payer: COMMERCIAL

## 2024-07-29 VITALS
HEIGHT: 68 IN | OXYGEN SATURATION: 99 % | SYSTOLIC BLOOD PRESSURE: 134 MMHG | HEART RATE: 74 BPM | BODY MASS INDEX: 20.31 KG/M2 | DIASTOLIC BLOOD PRESSURE: 83 MMHG | WEIGHT: 134 LBS | TEMPERATURE: 97 F

## 2024-07-29 DIAGNOSIS — E03.9 ACQUIRED HYPOTHYROIDISM: ICD-10-CM

## 2024-07-29 DIAGNOSIS — I10 PRIMARY HYPERTENSION: ICD-10-CM

## 2024-07-29 DIAGNOSIS — G40.209 PARTIAL SYMPTOMATIC EPILEPSY WITH COMPLEX PARTIAL SEIZURES, NOT INTRACTABLE, WITHOUT STATUS EPILEPTICUS: Primary | ICD-10-CM

## 2024-07-29 PROBLEM — F43.9 STRESS AT HOME: Status: RESOLVED | Noted: 2023-10-16 | Resolved: 2024-07-29

## 2024-07-29 PROCEDURE — 99214 OFFICE O/P EST MOD 30 MIN: CPT | Performed by: NURSE PRACTITIONER

## 2024-07-29 RX ORDER — RIMEGEPANT SULFATE 75 MG/75MG
TABLET, ORALLY DISINTEGRATING ORAL
COMMUNITY
Start: 2024-07-27

## 2024-07-29 NOTE — PROGRESS NOTES
"Chief Complaint  Seizures        Tasneem Valentino presents to [facility] MEDIC[facility]        Subjective      59-year-old female patient presents today to follow-up on seizures.  Medical history of lupus, depression, cervicalgia, migraines.    Patient was following Dr. Seipel for headaches. She takes maintenance every other day and does not help with acute headache episodes. Continues sumatriptan as needed.     She follows GI and getting repeat colonoscopy next month. Continues Linzess.     Dr. Santiago for epilepsy. Has since been referred to [facility]. Dr. Santiago has altered her Keprra dose and she is with an appt on September 6. Her last seizure episode was middle of June.     She does continue to follow pain management Dr. Epps. Treatment plan has not changed in quite a while.     Still with SOB with increased activity and when extremely hot. She no longer takes daily Breo. She only uses albuterol PRN. She will see Dr Gandhi next month.                         Objective         Vital Signs:     /83 (BP Location: Right arm, Patient Position: Sitting, Cuff Size: Adult)   Pulse 74   Temp 97 °F (36.1 °C) (Infrared)   Ht 172.7 cm (67.99\")   Wt 60.8 kg (134 lb)   SpO2 99%   BMI 20.38 kg/m²       Physical Exam  Vitals reviewed.   Constitutional:       Appearance: She is well-developed.      Comments:      HENT:      Head: Normocephalic and atraumatic.      Nose: Nose normal.      Mouth/Throat:      Mouth: Mucous membranes are moist.      Pharynx: Oropharynx is clear.   Eyes:      Conjunctiva/sclera: Conjunctivae normal.      Pupils: Pupils are equal, round, and reactive to light.   Cardiovascular:      Rate and Rhythm: Normal rate and regular rhythm.      Pulses: Normal pulses.      Heart sounds: Normal heart sounds.   Pulmonary:      Effort: Pulmonary effort is normal. No respiratory distress.      Breath sounds: Normal breath sounds. No stridor. No wheezing, rhonchi or rales.   Chest:      Chest " wall: No tenderness.   Musculoskeletal:         General: Normal range of motion.      Cervical back: Normal range of motion.   Skin:     General: Skin is warm and dry.      Findings: No rash.   Neurological:      Mental Status: She is alert and oriented to person, place, and time.   Psychiatric:         Attention and Perception: Attention and perception normal. She is attentive.         Mood and Affect: Mood normal. Affect is flat.         Speech: Speech normal.         Behavior: Behavior normal. Behavior is cooperative.         Thought Content: Thought content normal.                History of Present Illness      Patient Active Problem List   Diagnosis    Chronic daily headache    Intractable chronic migraine without aura    Lupus (systemic lupus erythematosus)    Pain in joint involving multiple sites    Anemia    Anxiety    Migraine    Meniere's disease    Hypothyroidism    Depressive disorder    Coronary artery spasm    Aortic insufficiency    Hypertension    History of Lyme disease    Kidney disease, chronic, stage II (GFR 60-89 ml/min)    Nodule of skin of left upper extremity    Weakness of right upper extremity    Vitamin D deficiency    History of renal stone    Nausea    Cervicalgia    Cervical spondylosis without myelopathy    Neuroforaminal stenosis of cervical spine    Cervical radiculopathy    SOB (shortness of breath) on exertion    Moderate persistent asthma without complication    Hoarse voice quality    Multiple pulmonary nodules    Intermittent lightheadedness    Dizziness    Partial symptomatic epilepsy with complex partial seizures, not intractable, without status epilepticus    Screening, lipid    Other fatigue    Gastritis without bleeding    Enlarged lymph node in neck         Past Medical History:   Diagnosis Date    Acute non-recurrent maxillary sinusitis 05/11/2022    Allergic     Anemia     Asthma     COVID-19 07/28/2021    COVID-19 05/21/2022    MELTON (dyspnea on exertion) 01/26/2023     Epilepsy     Essential hypertension 05/11/2022    Generalized abdominal pain 10/23/2023    Headache     Heart disease     HL (hearing loss)     Hoarseness of voice 11/08/2021    Hypertension     Hypothyroidism     Kidney disease     Lupus     Migraine     Renal insufficiency     Screen for colon cancer 02/10/2022    Seizures     Sjogren's disease     Solitary lung nodule 09/27/2022    Sore throat 10/28/2022    Stomach ache 09/11/2023    Stress at home 10/16/2023    Thyroid disease     Tremor     Upper respiratory tract infection 11/08/2021          Family History   Problem Relation Age of Onset    Hypertension Mother     Cancer Father     Arthritis Sister     Heart disease Brother     No Known Problems Brother     No Known Problems Brother           Past Surgical History:   Procedure Laterality Date    APPENDECTOMY      BRAIN SURGERY      occipital Neurotomy    CARDIAC CATHETERIZATION      CHOLECYSTECTOMY      ENDOSCOPY N/A 10/24/2023    Procedure: ESOPHAGOGASTRODUODENOSCOPY WITH BIOPSY X1 AREA;  Surgeon: Wesley Davies MD;  Location: [facility] ENDOSCOPY;  Service: Gastroenterology;  Laterality: N/A;  POST: GASTRITIS    OCCIPITAL NEURECTOMY  2010    OVARY SURGERY      SINUS SURGERY            Social History     Socioeconomic History    Marital status:    Tobacco Use    Smoking status: Never     Passive exposure: Never    Smokeless tobacco: Never   Vaping Use    Vaping status: Never Used   Substance and Sexual Activity    Alcohol use: No    Drug use: Never    Sexual activity: Defer                    Result Review :                                  BMI is within normal parameters. No other follow-up for BMI required.               Assessment and Plan      Diagnoses and all orders for this visit:    1. Partial symptomatic epilepsy with complex partial seizures, not intractable, without status epilepticus (Primary)    2. Acquired hypothyroidism  -     TSH    3. Primary hypertension  -     CBC &  Differential  -     Comprehensive metabolic panel        Patient seizures are much improved but continues to have seizure activity.  She is going to see a new specialist at [facility] per Dr. Abdi recommendation.  She remains adherent on the Keppra.  She does follow with neurology for migraines and pain management for cervicalgia and neuroforaminal stenosis.  Patient will see pulmonology next month regarding shortness of breath.  She does report it is improving and she only uses albuterol as needed.  Blood pressure stable.  She is due for labs we will check TSH level and see how thyroid is doing.  She has been stable on Synthroid 75 mcg for some time now                  Follow Up       No follow-ups on file.      Patient was given instructions and counseling regarding her condition or for health maintenance advice. Please see specific information pulled into the AVS if appropriate.     Piper Levine, APRN7/29/202409:25 EDT  This note has been electronically signed

## 2024-07-30 ENCOUNTER — PATIENT MESSAGE (OUTPATIENT)
Dept: FAMILY MEDICINE CLINIC | Facility: CLINIC | Age: 59
End: 2024-07-30
Payer: COMMERCIAL

## 2024-07-30 LAB
ALBUMIN SERPL-MCNC: 4.6 G/DL (ref 3.8–4.9)
ALP SERPL-CCNC: 140 IU/L (ref 44–121)
ALT SERPL-CCNC: 14 IU/L (ref 0–32)
AST SERPL-CCNC: 21 IU/L (ref 0–40)
BASOPHILS # BLD AUTO: 0 X10E3/UL (ref 0–0.2)
BASOPHILS NFR BLD AUTO: 1 %
BILIRUB SERPL-MCNC: 0.3 MG/DL (ref 0–1.2)
BUN SERPL-MCNC: 18 MG/DL (ref 6–24)
BUN/CREAT SERPL: 18 (ref 9–23)
CALCIUM SERPL-MCNC: 9.9 MG/DL (ref 8.7–10.2)
CHLORIDE SERPL-SCNC: 103 MMOL/L (ref 96–106)
CO2 SERPL-SCNC: 23 MMOL/L (ref 20–29)
CREAT SERPL-MCNC: 1.01 MG/DL (ref 0.57–1)
EGFRCR SERPLBLD CKD-EPI 2021: 65 ML/MIN/1.73
EOSINOPHIL # BLD AUTO: 0.1 X10E3/UL (ref 0–0.4)
EOSINOPHIL NFR BLD AUTO: 2 %
ERYTHROCYTE [DISTWIDTH] IN BLOOD BY AUTOMATED COUNT: 13.4 % (ref 11.7–15.4)
GLOBULIN SER CALC-MCNC: 2.4 G/DL (ref 1.5–4.5)
GLUCOSE SERPL-MCNC: 86 MG/DL (ref 70–99)
HCT VFR BLD AUTO: 43 % (ref 34–46.6)
HGB BLD-MCNC: 13.7 G/DL (ref 11.1–15.9)
IMM GRANULOCYTES # BLD AUTO: 0 X10E3/UL (ref 0–0.1)
IMM GRANULOCYTES NFR BLD AUTO: 0 %
LYMPHOCYTES # BLD AUTO: 1.1 X10E3/UL (ref 0.7–3.1)
LYMPHOCYTES NFR BLD AUTO: 22 %
MCH RBC QN AUTO: 28.5 PG (ref 26.6–33)
MCHC RBC AUTO-ENTMCNC: 31.9 G/DL (ref 31.5–35.7)
MCV RBC AUTO: 90 FL (ref 79–97)
MONOCYTES # BLD AUTO: 0.4 X10E3/UL (ref 0.1–0.9)
MONOCYTES NFR BLD AUTO: 9 %
NEUTROPHILS # BLD AUTO: 3.1 X10E3/UL (ref 1.4–7)
NEUTROPHILS NFR BLD AUTO: 66 %
PLATELET # BLD AUTO: 221 X10E3/UL (ref 150–450)
POTASSIUM SERPL-SCNC: 4.4 MMOL/L (ref 3.5–5.2)
PROT SERPL-MCNC: 7 G/DL (ref 6–8.5)
RBC # BLD AUTO: 4.8 X10E6/UL (ref 3.77–5.28)
SODIUM SERPL-SCNC: 142 MMOL/L (ref 134–144)
TSH SERPL DL<=0.005 MIU/L-ACNC: 1.32 UIU/ML (ref 0.45–4.5)
WBC # BLD AUTO: 4.8 X10E3/UL (ref 3.4–10.8)

## 2024-08-01 ENCOUNTER — OFFICE VISIT (OUTPATIENT)
Dept: PAIN MEDICINE | Facility: CLINIC | Age: 59
End: 2024-08-01
Payer: COMMERCIAL

## 2024-08-01 VITALS
SYSTOLIC BLOOD PRESSURE: 132 MMHG | BODY MASS INDEX: 20.53 KG/M2 | WEIGHT: 135 LBS | DIASTOLIC BLOOD PRESSURE: 87 MMHG | OXYGEN SATURATION: 100 % | RESPIRATION RATE: 16 BRPM | HEART RATE: 73 BPM

## 2024-08-01 DIAGNOSIS — Z79.899 HIGH RISK MEDICATION USE: Primary | ICD-10-CM

## 2024-08-01 DIAGNOSIS — M47.812 CERVICAL SPONDYLOSIS WITHOUT MYELOPATHY: ICD-10-CM

## 2024-08-01 DIAGNOSIS — M25.50 PAIN IN JOINT INVOLVING MULTIPLE SITES: ICD-10-CM

## 2024-08-01 DIAGNOSIS — G43.719 INTRACTABLE CHRONIC MIGRAINE WITHOUT AURA AND WITHOUT STATUS MIGRAINOSUS: ICD-10-CM

## 2024-08-01 DIAGNOSIS — M54.2 CERVICALGIA: Primary | ICD-10-CM

## 2024-08-01 DIAGNOSIS — M54.12 CERVICAL RADICULOPATHY: ICD-10-CM

## 2024-08-01 DIAGNOSIS — M48.02 NEUROFORAMINAL STENOSIS OF CERVICAL SPINE: ICD-10-CM

## 2024-08-01 RX ORDER — SUMATRIPTAN 100 MG/1
100 TABLET, FILM COATED ORAL ONCE AS NEEDED
Qty: 9 TABLET | Refills: 5 | Status: SHIPPED | OUTPATIENT
Start: 2024-08-01

## 2024-08-01 RX ORDER — HYDROCODONE BITARTRATE AND ACETAMINOPHEN 10; 300 MG/1; MG/1
1 TABLET ORAL EVERY 6 HOURS PRN
Qty: 120 TABLET | Refills: 0 | Status: SHIPPED | OUTPATIENT
Start: 2024-08-01

## 2024-08-01 NOTE — PROGRESS NOTES
Subjective   Tasneem Valentino is a 59 y.o. female.     History of Present Illness  Chronic daily headaches, also widespread joint pain with SLE, 10/10 at worst, 1/10 at best, always present, varies, aching, stabbing, worse with weather changes, interferes with ADLs, sleep, failed chiropractor, PT, massage, occipital neurotomy, headaches stim trial. MRI brain wnl. Prior notes reviewed, as above, was seeing Dr. Laguerre with Norco 10mg QID prn and Imitrex 100mg #9/month with some relief, also started Amovig 70mg, uses Zofran 4mg prn. No FH of substance abuse. 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 Had 3 cervical ESIs, symptoms essentially resolved after 1st, improved after 2nd, weakness worsening with a lot of lifting at work. Getting out of breath with any exercise, dx with COPD and asthma but nonsmoker, being worked up, had unremarkable 2D echo and Holter monitor, going to see Neuro, off work on FMLA for now, Neuro dx her with seizures with abnormal EEG, increased Topamax used for migraine prophylaxis now also controlling seizures, began Zonisamide also.   Neck Pain   Associated symptoms include headaches. Pertinent negatives include no chest pain, fever, numbness, trouble swallowing or weakness.   Headache       The following portions of the patient's history were reviewed and updated as appropriate: allergies, current medications, past family history, past medical history, past social history, past surgical history and problem list.    Review of Systems   Constitutional:  Negative for chills, fatigue and fever.   HENT:  Positive for hearing loss. Negative for trouble swallowing.    Eyes:  Positive for visual disturbance.   Respiratory:  Negative for shortness of breath.    Cardiovascular:  Negative for chest pain.   Gastrointestinal:  Positive for constipation. Negative for abdominal pain, diarrhea, nausea and vomiting.    Genitourinary:  Negative for urinary incontinence.   Musculoskeletal:  Positive for neck pain. Negative for arthralgias, back pain, joint swelling and myalgias.   Neurological:  Positive for dizziness and headache. Negative for weakness and numbness.       Objective   Physical Exam   Constitutional: She is oriented to person, place, and time. She appears well-developed and well-nourished.   HENT:   Head: Normocephalic and atraumatic.   Eyes: Pupils are equal, round, and reactive to light.   Cardiovascular: Normal rate and regular rhythm.   Murmur heard.  Pulmonary/Chest: Breath sounds normal.   Abdominal: Soft. Bowel sounds are normal. She exhibits no distension. There is no abdominal tenderness.   Neurological: She is alert and oriented to person, place, and time. She has normal reflexes. She displays normal reflexes. No sensory deficit.   Psychiatric: Her behavior is normal. Thought content normal.         Assessment & Plan   Diagnoses and all orders for this visit:    1. Cervicalgia (Primary)    2. Cervical radiculopathy    3. Cervical spondylosis without myelopathy    4. Intractable chronic migraine without aura and without status migrainosus    5. Neuroforaminal stenosis of cervical spine    6. Pain in joint involving multiple sites        UDS in order 10/19/23. Inspect reviewed, in order.  Treatment plan will consist of continuing current medication as long as it remains effective and is necessary, while evaluating patient at each visit and determining if the medication can be lowered or discontinued, while also using nonopioid therapies to reduce reliance on opioids.  Cont Norco 10mg QID prn, Imitrex 100mg #9. Filled Norco 7/2/24 per new Inspect.  Patient's symptoms are still adequately managed by current medication regimen, is doing well at this strength and dosage, therefore I will continue to prescribe unchanged as the most appropriate course of treatment.  Began Zofran 4mg TID prn.  Had 3 cervical ESIs  per MRI with nearly 100% resolution of radicular symptoms. No contrast per allergy. Some discomfort with left paramedian approach, will perform on right in future. Performed repeat with similar relief still.  Failed headache procedures. Referred to Dr. Seiple for headaches, seeing Dr. Santiago, started Keppra.  RTC in 3 months for f/u.

## 2024-08-21 ENCOUNTER — ON CAMPUS - OUTPATIENT (OUTPATIENT)
Age: 59
End: 2024-08-21
Payer: COMMERCIAL

## 2024-08-21 ENCOUNTER — ON CAMPUS - OUTPATIENT (OUTPATIENT)
Dept: RURAL HOSPITAL 3 | Facility: HOSPITAL | Age: 59
End: 2024-08-21
Payer: COMMERCIAL

## 2024-08-21 DIAGNOSIS — Z12.11 ENCOUNTER FOR SCREENING FOR MALIGNANT NEOPLASM OF COLON: ICD-10-CM

## 2024-08-21 DIAGNOSIS — D12.3 BENIGN NEOPLASM OF TRANSVERSE COLON: ICD-10-CM

## 2024-08-21 DIAGNOSIS — D12.0 BENIGN NEOPLASM OF CECUM: ICD-10-CM

## 2024-08-21 PROCEDURE — 45385 COLONOSCOPY W/LESION REMOVAL: CPT | Mod: 33 | Performed by: INTERNAL MEDICINE

## 2024-10-31 ENCOUNTER — OFFICE VISIT (OUTPATIENT)
Dept: PAIN MEDICINE | Facility: CLINIC | Age: 59
End: 2024-10-31
Payer: COMMERCIAL

## 2024-10-31 VITALS
DIASTOLIC BLOOD PRESSURE: 82 MMHG | SYSTOLIC BLOOD PRESSURE: 143 MMHG | BODY MASS INDEX: 20.84 KG/M2 | HEART RATE: 63 BPM | WEIGHT: 137 LBS | RESPIRATION RATE: 16 BRPM | OXYGEN SATURATION: 97 %

## 2024-10-31 DIAGNOSIS — M54.2 CERVICALGIA: Primary | ICD-10-CM

## 2024-10-31 DIAGNOSIS — M25.50 PAIN IN JOINT INVOLVING MULTIPLE SITES: ICD-10-CM

## 2024-10-31 DIAGNOSIS — M47.812 CERVICAL SPONDYLOSIS WITHOUT MYELOPATHY: ICD-10-CM

## 2024-10-31 DIAGNOSIS — M54.12 CERVICAL RADICULOPATHY: ICD-10-CM

## 2024-10-31 DIAGNOSIS — R51.9 CHRONIC DAILY HEADACHE: ICD-10-CM

## 2024-10-31 RX ORDER — SUMATRIPTAN 100 MG/1
100 TABLET, FILM COATED ORAL ONCE AS NEEDED
Qty: 9 TABLET | Refills: 5 | Status: SHIPPED | OUTPATIENT
Start: 2024-10-31

## 2024-10-31 RX ORDER — HYDROCODONE BITARTRATE AND ACETAMINOPHEN 10; 325 MG/1; MG/1
1 TABLET ORAL EVERY 6 HOURS PRN
Qty: 120 TABLET | Refills: 0 | Status: SHIPPED | OUTPATIENT
Start: 2024-10-31

## 2024-10-31 RX ORDER — ATOGEPANT 60 MG/1
TABLET ORAL
COMMUNITY

## 2024-10-31 RX ORDER — LINACLOTIDE 145 UG/1
145 CAPSULE, GELATIN COATED ORAL EVERY MORNING
COMMUNITY
Start: 2024-08-22

## 2024-10-31 NOTE — PROGRESS NOTES
Subjective   Tasneem Valentino is a 59 y.o. female.     History of Present Illness  Chronic daily headaches, also widespread joint pain with SLE, 10/10 at worst, 1/10 at best, always present, varies, aching, stabbing, worse with weather changes, interferes with ADLs, sleep, failed chiropractor, PT, massage, occipital neurotomy, headaches stim trial. MRI brain wnl. Prior notes reviewed, as above, was seeing Dr. Laguerre with Norco 10mg QID prn and Imitrex 100mg #9/month with some relief, also started Amovig 70mg, uses Zofran 4mg prn. No FH of substance abuse. 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 Had 3 cervical ESIs, symptoms essentially resolved after 1st, improved after 2nd, weakness worsening with a lot of lifting at work. Getting out of breath with any exercise, dx with COPD and asthma but nonsmoker, being worked up, had unremarkable 2D echo and Holter monitor, going to see Neuro, off work on FMLA for now, Neuro dx her with seizures with abnormal EEG, increased Topamax used for migraine prophylaxis now also controlling seizures, began Zonisamide also.   Neck Pain   Associated symptoms include headaches. Pertinent negatives include no chest pain, fever, numbness, trouble swallowing or weakness.   Headache       The following portions of the patient's history were reviewed and updated as appropriate: allergies, current medications, past family history, past medical history, past social history, past surgical history and problem list.    Review of Systems   Constitutional:  Negative for chills, fatigue and fever.   HENT:  Positive for hearing loss. Negative for trouble swallowing.    Eyes:  Positive for visual disturbance.   Respiratory:  Negative for shortness of breath.    Cardiovascular:  Negative for chest pain.   Gastrointestinal:  Positive for constipation. Negative for abdominal pain, diarrhea, nausea and vomiting.    Genitourinary:  Negative for urinary incontinence.   Musculoskeletal:  Positive for neck pain. Negative for arthralgias, back pain, joint swelling and myalgias.   Neurological:  Positive for dizziness and headache. Negative for weakness and numbness.       Objective   Physical Exam   Constitutional: She is oriented to person, place, and time. She appears well-developed and well-nourished.   HENT:   Head: Normocephalic and atraumatic.   Eyes: Pupils are equal, round, and reactive to light.   Cardiovascular: Normal rate and regular rhythm.   Murmur heard.  Pulmonary/Chest: Breath sounds normal.   Abdominal: Soft. Bowel sounds are normal. She exhibits no distension. There is no abdominal tenderness.   Neurological: She is alert and oriented to person, place, and time. She has normal reflexes. She displays normal reflexes. No sensory deficit.   Psychiatric: Her behavior is normal. Thought content normal.         Assessment & Plan   Diagnoses and all orders for this visit:    1. Cervicalgia (Primary)    2. Cervical spondylosis without myelopathy    3. Cervical radiculopathy    4. Chronic daily headache    5. Pain in joint involving multiple sites        UDS in order 8/1/24. Inspect reviewed, in order.  Treatment plan will consist of continuing current medication as long as it remains effective and is necessary, while evaluating patient at each visit and determining if the medication can be lowered or discontinued, while also using nonopioid therapies to reduce reliance on opioids.  Cont Norco 10mg QID prn, Imitrex 100mg #9. Filled Norco 9/25/24 per new Inspect. Two refills this visit.  Patient's symptoms are still adequately managed by current medication regimen, is doing well at this strength and dosage, therefore I will continue to prescribe unchanged as the most appropriate course of treatment.  Began Zofran 4mg TID prn.  Had 3 cervical ESIs per MRI with nearly 100% resolution of radicular symptoms. No contrast per  allergy. Some discomfort with left paramedian approach, will perform on right in future. Performed repeat with similar relief still.  Failed headache procedures. Referred to Dr. Seiple for headaches, seeing Dr. Santiago, [facility].  RTC in 3 months for f/u.

## 2024-11-11 ENCOUNTER — OFFICE VISIT (OUTPATIENT)
Dept: FAMILY MEDICINE CLINIC | Facility: CLINIC | Age: 59
End: 2024-11-11
Payer: COMMERCIAL

## 2024-11-11 VITALS
HEIGHT: 68 IN | DIASTOLIC BLOOD PRESSURE: 76 MMHG | BODY MASS INDEX: 20.67 KG/M2 | TEMPERATURE: 97.5 F | RESPIRATION RATE: 18 BRPM | SYSTOLIC BLOOD PRESSURE: 111 MMHG | WEIGHT: 136.4 LBS | HEART RATE: 65 BPM | OXYGEN SATURATION: 97 %

## 2024-11-11 DIAGNOSIS — I10 PRIMARY HYPERTENSION: ICD-10-CM

## 2024-11-11 DIAGNOSIS — R11.0 NAUSEA: Primary | ICD-10-CM

## 2024-11-11 DIAGNOSIS — G40.209 PARTIAL SYMPTOMATIC EPILEPSY WITH COMPLEX PARTIAL SEIZURES, NOT INTRACTABLE, WITHOUT STATUS EPILEPTICUS: ICD-10-CM

## 2024-11-11 DIAGNOSIS — E03.9 ACQUIRED HYPOTHYROIDISM: ICD-10-CM

## 2024-11-11 DIAGNOSIS — I35.1 NONRHEUMATIC AORTIC VALVE INSUFFICIENCY: ICD-10-CM

## 2024-11-11 DIAGNOSIS — M32.9 SYSTEMIC LUPUS ERYTHEMATOSUS, UNSPECIFIED SLE TYPE, UNSPECIFIED ORGAN INVOLVEMENT STATUS: ICD-10-CM

## 2024-11-11 DIAGNOSIS — K52.9 GASTROENTERITIS: ICD-10-CM

## 2024-11-11 PROCEDURE — 87428 SARSCOV & INF VIR A&B AG IA: CPT | Performed by: FAMILY MEDICINE

## 2024-11-11 PROCEDURE — 99214 OFFICE O/P EST MOD 30 MIN: CPT | Performed by: FAMILY MEDICINE

## 2024-11-11 NOTE — PROGRESS NOTES
"Subjective   Tasneem Valentino is a 59 y.o. female.   Chief Complaint   Patient presents with    Establish Care    Diarrhea    Headache    Fatigue    Nasal Congestion    Nausea       History of Present Illness   59 y.o. female presents to the office today to transfer care to me from Piper Webb who is no longer in the practice.    She has a complicated medical history as below.  Piper has been prescribing her levothyroxine but looks like all of her other medicines come from specialists.    She has chronic pain and follows with Dr. Epps    Looks like she has complex partial seizures.  She was admitted from 9/30 through 10/3 at [facility] for continuous video EEG monitoring and cardiac telemetry.      Looks like they weaned off her antiseizure medicines, she underwent sleep deprivation, hyperventilation, photic stimulation.  None of these things triggered any typical events.  Baseline waking and sleep EEG was normal.  Discharge diagnosis was \"spells of trembling\".  The plan was to do home EEG monitoring to try to capture 1 of these events.  They recommended she strictly adhere to seizure precautions until these events are under control.  Looks like neurology is also following her for migraines.    According to the chart she has asthma    Lupus-    History of Present Illness  The patient presents for evaluation of multiple medical concerns. She is accompanied by an adult male.    She has been experiencing illness since Friday, characterized by nausea, diarrhea, cold sweats, weakness, headaches, and sinus congestion. The diarrhea is not frequent but is accompanied by intense abdominal pain after eating, necessitating a visit to the bathroom. She can tolerate liquids, but solid food makes her feel unwell. The diarrhea was watery on the first day and has been loose since, occurring once daily. She has been avoiding food due to fear of exacerbating the symptoms. She has been experiencing cold sweats but no fever. " She has been taking Zofran at home and has been supplementing her hydration with Gatorade.    She has a history of sinus issues and has had seizures since the birth of her third child. She suspected she had COVID-19 when she noticed spots on her lungs and experienced shortness of breath. She has been unable to work and was referred to a neurologist at [place]. She has not had a seizure since September 3, 2024, and is currently under the care of [facility]'s neuro team. She has been on nine different medications, each causing some side effects. Keppra has been the most effective medication, but it causes some forgetfulness. She felt better when she was off Keppra during her hospital stay, but she had to resume it. She is scheduled for a home EEG on November 15, 2024.    She has lupus and is under the care of a rheumatologist. She is taking hydroxychloroquine and prednisone, which increases her appetite, so she does not take it daily.    She has high blood pressure and a leaky aortic valve. She was hospitalized with gastritis and lost weight down to 118 pounds. She has not worked since her symptoms started. She has had COVID-19 three times and received the Fernando and Fernando vaccine. She was unable to pass her CPR test due to lack of strength and shortness of breath. She is seeking documentation for her disability insurance.    SOCIAL HISTORY  The patient denies smoking.      Patient Active Problem List    Diagnosis Date Noted    Enlarged lymph node in neck 04/29/2024    Other fatigue 12/14/2023    Gastritis without bleeding 12/14/2023    Screening, lipid 05/01/2023    Partial symptomatic epilepsy with complex partial seizures, not intractable, without status epilepticus 03/23/2023    Dizziness 02/23/2023    Multiple pulmonary nodules 01/26/2023    Intermittent lightheadedness 01/26/2023    Hoarse voice quality 10/28/2022    Moderate persistent asthma without complication 09/27/2022    SOB (shortness of breath) on  exertion 07/08/2022    Cervicalgia 09/01/2020    Cervical spondylosis without myelopathy 09/01/2020    Neuroforaminal stenosis of cervical spine 09/01/2020    Cervical radiculopathy 09/01/2020    Nausea 07/21/2020    Hypertension 06/23/2020    History of Lyme disease 06/23/2020    Kidney disease, chronic, stage II (GFR 60-89 ml/min) 06/23/2020    Nodule of skin of left upper extremity 06/23/2020    Weakness of right upper extremity 06/23/2020    Vitamin D deficiency 06/23/2020    History of renal stone 06/23/2020    Anemia 06/22/2020    Anxiety 06/22/2020    Migraine 06/22/2020    Pain in joint involving multiple sites 11/05/2019    Aortic insufficiency 05/30/2019    Hypothyroidism 07/12/2017    Depressive disorder 07/12/2017    Intractable chronic migraine without aura 02/11/2013     Note Last Updated: 11/5/2019     Overview:   2015 IMO UPDATE      Lupus (systemic lupus erythematosus) 06/01/2012    Coronary artery spasm 06/01/2010    Chronic daily headache 01/01/2000    Meniere's disease 09/01/1991           Past Surgical History:   Procedure Laterality Date    APPENDECTOMY      BRAIN SURGERY      occipital Neurotomy    CARDIAC CATHETERIZATION      CHOLECYSTECTOMY      ENDOSCOPY N/A 10/24/2023    Procedure: ESOPHAGOGASTRODUODENOSCOPY WITH BIOPSY X1 AREA;  Surgeon: Wesley Davies MD;  Location: [facility] ENDOSCOPY;  Service: Gastroenterology;  Laterality: N/A;  POST: GASTRITIS    OCCIPITAL NEURECTOMY  2010    OVARY SURGERY      SINUS SURGERY       Current Outpatient Medications on File Prior to Visit   Medication Sig    albuterol sulfate  (90 Base) MCG/ACT inhaler INHALE 2 PUFFS EVERY 4 HOURS AS NEEDED FOR WHEEZING    Atogepant (Qulipta) 60 MG tablet Take 1 tablet by mouth Daily.    dilTIAZem CD (CARDIZEM CD) 240 MG 24 hr capsule Take 1 capsule by mouth Daily.    HYDROcodone-acetaminophen (Norco)  MG per tablet Take 1 tablet by mouth Every 6 (Six) Hours As Needed for Moderate Pain.     HYDROcodone-acetaminophen (Norco)  MG per tablet Take 1 tablet by mouth Every 6 (Six) Hours As Needed for Moderate Pain.    HYDROcodone-acetaminophen (Norco)  MG per tablet Take 1 tablet by mouth Every 6 (Six) Hours As Needed for Moderate Pain.    HYDROcodone-Acetaminophen (XODOL )  MG per tablet Take 1 tablet by mouth Every 6 (Six) Hours As Needed for Moderate Pain.    hydroxychloroquine (PLAQUENIL) 200 MG tablet Take 1 tablet by mouth 2 (Two) Times a Day.    Keppra 500 MG tablet Take  by mouth. 250 am and 500 pm    levothyroxine (SYNTHROID, LEVOTHROID) 75 MCG tablet TAKE 1 TABLET BY MOUTH DAILY    Linzess 145 MCG capsule capsule Take 1 capsule by mouth Every Morning.    meclizine (ANTIVERT) 25 MG tablet Take 1 tablet by mouth 3 (Three) Times a Day As Needed.    nitroglycerin (NITROSTAT) 0.4 MG SL tablet Place 1 tablet under the tongue Every 5 (Five) Minutes As Needed for Chest Pain. Take no more than 3 doses in 15 minutes.    ondansetron (ZOFRAN) 4 MG tablet Take 1 tablet by mouth Every 6 (Six) Hours As Needed for Nausea or Vomiting.    predniSONE (DELTASONE) 5 MG tablet Take 1 tablet by mouth Daily.    promethazine (PHENERGAN) 25 MG tablet Take 0.5-1 tablets by mouth Every 8 (Eight) Hours As Needed for Nausea or Vomiting.    SUMAtriptan (IMITREX) 100 MG tablet Take 1 tablet by mouth 1 (One) Time As Needed for Migraine. Take one tablet at onset of headache. May repeat dose one time in 2 hours if headache not relieved.     No current facility-administered medications on file prior to visit.     Allergies   Allergen Reactions    Contrast Dye (Echo Or Unknown Ct/Mr) Itching and Shortness Of Breath    Sulfa Antibiotics Swelling    Tizanidine Mental Status Change and Hallucinations    Iodinated Contrast Media Itching    Breztri Aerosphere [Budeson-Glycopyrrol-Formoterol] Photosensitivity     Double vision    Lacosamide GI Intolerance    Oxcarbazepine Confusion    Topiramate Confusion     Social  "History     Socioeconomic History    Marital status:    Tobacco Use    Smoking status: Never     Passive exposure: Never    Smokeless tobacco: Never   Vaping Use    Vaping status: Never Used   Substance and Sexual Activity    Alcohol use: No    Drug use: Never    Sexual activity: Yes     Partners: Male     Family History   Problem Relation Age of Onset    Hypertension Mother     Cancer Father     Arthritis Sister     Heart disease Brother     No Known Problems Brother     No Known Problems Brother        Review of Systems    Objective   /76 (BP Location: Right arm, Patient Position: Sitting, Cuff Size: Adult)   Pulse 65   Temp 97.5 °F (36.4 °C) (Infrared)   Resp 18   Ht 172.7 cm (67.99\")   Wt 61.9 kg (136 lb 6.4 oz)   LMP  (LMP Unknown)   SpO2 97%   Breastfeeding No   BMI 20.75 kg/m²   Physical Exam  Constitutional:       Appearance: She is well-developed.      Comments:      HENT:      Head: Normocephalic and atraumatic.   Eyes:      Conjunctiva/sclera: Conjunctivae normal.   Cardiovascular:      Rate and Rhythm: Normal rate.   Pulmonary:      Effort: Pulmonary effort is normal.   Musculoskeletal:         General: Normal range of motion.      Cervical back: Normal range of motion.   Skin:     General: Skin is warm and dry.      Findings: No rash.   Neurological:      Mental Status: She is alert and oriented to person, place, and time.   Psychiatric:         Behavior: Behavior normal.       Physical Exam        Office Visit on 07/29/2024   Component Date Value Ref Range Status    WBC 07/29/2024 4.8  3.4 - 10.8 x10E3/uL Final    RBC 07/29/2024 4.80  3.77 - 5.28 x10E6/uL Final    Hemoglobin 07/29/2024 13.7  11.1 - 15.9 g/dL Final    Hematocrit 07/29/2024 43.0  34.0 - 46.6 % Final    MCV 07/29/2024 90  79 - 97 fL Final    MCH 07/29/2024 28.5  26.6 - 33.0 pg Final    MCHC 07/29/2024 31.9  31.5 - 35.7 g/dL Final    RDW 07/29/2024 13.4  11.7 - 15.4 % Final    Platelets 07/29/2024 221  150 - 450 " x10E3/uL Final    Neutrophil Rel % 07/29/2024 66  Not Estab. % Final    Lymphocyte Rel % 07/29/2024 22  Not Estab. % Final    Monocyte Rel % 07/29/2024 9  Not Estab. % Final    Eosinophil Rel % 07/29/2024 2  Not Estab. % Final    Basophil Rel % 07/29/2024 1  Not Estab. % Final    Neutrophils Absolute 07/29/2024 3.1  1.4 - 7.0 x10E3/uL Final    Lymphocytes Absolute 07/29/2024 1.1  0.7 - 3.1 x10E3/uL Final    Monocytes Absolute 07/29/2024 0.4  0.1 - 0.9 x10E3/uL Final    Eosinophils Absolute 07/29/2024 0.1  0.0 - 0.4 x10E3/uL Final    Basophils Absolute 07/29/2024 0.0  0.0 - 0.2 x10E3/uL Final    Immature Granulocyte Rel % 07/29/2024 0  Not Estab. % Final    Immature Grans Absolute 07/29/2024 0.0  0.0 - 0.1 x10E3/uL Final    Glucose 07/29/2024 86  70 - 99 mg/dL Final    BUN 07/29/2024 18  6 - 24 mg/dL Final    Creatinine 07/29/2024 1.01 (H)  0.57 - 1.00 mg/dL Final    EGFR Result 07/29/2024 65  >59 mL/min/1.73 Final    BUN/Creatinine Ratio 07/29/2024 18  9 - 23 Final    Sodium 07/29/2024 142  134 - 144 mmol/L Final    Potassium 07/29/2024 4.4  3.5 - 5.2 mmol/L Final    Chloride 07/29/2024 103  96 - 106 mmol/L Final    Total CO2 07/29/2024 23  20 - 29 mmol/L Final    Calcium 07/29/2024 9.9  8.7 - 10.2 mg/dL Final    Total Protein 07/29/2024 7.0  6.0 - 8.5 g/dL Final    Albumin 07/29/2024 4.6  3.8 - 4.9 g/dL Final    Globulin 07/29/2024 2.4  1.5 - 4.5 g/dL Final    Total Bilirubin 07/29/2024 0.3  0.0 - 1.2 mg/dL Final    Alkaline Phosphatase 07/29/2024 140 (H)  44 - 121 IU/L Final    AST (SGOT) 07/29/2024 21  0 - 40 IU/L Final    ALT (SGPT) 07/29/2024 14  0 - 32 IU/L Final    TSH 07/29/2024 1.320  0.450 - 4.500 uIU/mL Final     Results  Office Visit on 11/11/2024   Component Date Value Ref Range Status    SARS Antigen 11/11/2024 Not Detected  Not Detected, Presumptive Negative Final    Influenza A Antigen DANY 11/11/2024 Not Detected  Not Detected Final    Influenza B Antigen DANY 11/11/2024 Not Detected  Not Detected  Final    Internal Control 11/11/2024 Passed  Passed Final    Lot Number 11/11/2024 [id number]   Final    Expiration Date 11/11/2024 10/23/25   Final         BMI is within normal parameters. No other follow-up for BMI required.     Assessment & Plan   Diagnoses and all orders for this visit:    1. Nausea (Primary)  -     POCT SARS-CoV-2 Antigen DANY + Flu    2. Acquired hypothyroidism    3. Partial symptomatic epilepsy with complex partial seizures, not intractable, without status epilepticus    4. Systemic lupus erythematosus, unspecified SLE type, unspecified organ involvement status    5. Gastroenteritis    6. Primary hypertension    7. Nonrheumatic aortic valve insufficiency      Assessment & Plan  1. Gastroenteritis.  Her symptoms of crampy abdominal pain and loose stool post meals suggest a viral gastroenteritis. She reports nausea, diarrhea, cold sweats, and weakness since Friday. She was advised to continue taking Zofran for nausea and to maintain hydration with Gatorade to replenish lost salts. If her stool becomes mucousy or bloody, a stool test will be conducted. She was also advised to monitor for any blood in the stool.    2. Seizure Disorder.  She has not had a seizure since September 3. She is currently on Keppra, which has been effective but causes forgetfulness. The dosage was decreased due to memory issues. She is scheduled for a home EEG on November 15 and will follow up with her neurologist afterward. She was advised to inform her family about the potential forgetfulness caused by her medication.  She is a nurse by training and currently the memory issues caused by the Keppra as well as the presence of a seizure disorder itself, prevent her from working in her usual occupation.      3. Lupus.  She is currently taking hydroxychloroquine and prednisone for lupus. Prednisone helps but causes increased appetite. She was advised to continue her current regimen and to avoid changing medications due to the  complexity of her conditions.    4. Hypertension.  She has a history of high blood pressure and is being followed by a cardiologist. She is scheduled to see the cardiologist's nurse practitioner on Wednesday for a follow-up and cardiac clearance.    5. Health Maintenance.  Her thyroid levels were last checked 3 months ago and were within normal range. Thyroid function will be monitored once or twice a year.    Follow-up  Return in 3 months for follow up.        Call with any problems or concerns before next visit       Return in about 3 months (around 2/11/2025).  Patient or patient representative verbalized consent for the use of Ambient Listening during the visit with  Elena Moore MD for chart documentation. 11/11/2024  12:19 EST    Part of this note may be an electronic transcription/translation of spoken language to printed text using the Dragon Dictation System    Elena Moore MD11/11/202412:21 EST  This note has been electronically signed

## 2025-01-22 DIAGNOSIS — M54.2 CERVICALGIA: ICD-10-CM

## 2025-01-22 RX ORDER — HYDROCODONE BITARTRATE AND ACETAMINOPHEN 10; 325 MG/1; MG/1
1 TABLET ORAL EVERY 6 HOURS PRN
Qty: 120 TABLET | Refills: 0 | Status: SHIPPED | OUTPATIENT
Start: 2025-01-22

## 2025-01-30 ENCOUNTER — OFFICE VISIT (OUTPATIENT)
Dept: PAIN MEDICINE | Facility: CLINIC | Age: 60
End: 2025-01-30
Payer: COMMERCIAL

## 2025-01-30 VITALS
BODY MASS INDEX: 20.53 KG/M2 | DIASTOLIC BLOOD PRESSURE: 79 MMHG | WEIGHT: 135 LBS | OXYGEN SATURATION: 99 % | SYSTOLIC BLOOD PRESSURE: 131 MMHG | HEART RATE: 77 BPM | RESPIRATION RATE: 16 BRPM

## 2025-01-30 DIAGNOSIS — M54.2 CERVICALGIA: Primary | ICD-10-CM

## 2025-01-30 DIAGNOSIS — M25.50 PAIN IN JOINT INVOLVING MULTIPLE SITES: ICD-10-CM

## 2025-01-30 DIAGNOSIS — M47.812 CERVICAL SPONDYLOSIS WITHOUT MYELOPATHY: ICD-10-CM

## 2025-01-30 DIAGNOSIS — M48.02 NEUROFORAMINAL STENOSIS OF CERVICAL SPINE: ICD-10-CM

## 2025-01-30 DIAGNOSIS — G43.719 INTRACTABLE CHRONIC MIGRAINE WITHOUT AURA AND WITHOUT STATUS MIGRAINOSUS: ICD-10-CM

## 2025-01-30 DIAGNOSIS — M54.12 CERVICAL RADICULOPATHY: ICD-10-CM

## 2025-01-30 RX ORDER — HYDROCODONE BITARTRATE AND ACETAMINOPHEN 10; 325 MG/1; MG/1
1 TABLET ORAL EVERY 6 HOURS PRN
Qty: 120 TABLET | Refills: 0 | Status: SHIPPED | OUTPATIENT
Start: 2025-01-30

## 2025-01-30 RX ORDER — GALCANEZUMAB 120 MG/ML
INJECTION, SOLUTION SUBCUTANEOUS
COMMUNITY
Start: 2025-01-13

## 2025-01-30 NOTE — PROGRESS NOTES
Subjective   Tasneem Valentino is a 60 y.o. female.     History of Present Illness  Chronic daily headaches, also widespread joint pain with SLE, 10/10 at worst, 1/10 at best, always present, varies, aching, stabbing, worse with weather changes, interferes with ADLs, sleep, failed chiropractor, PT, massage, occipital neurotomy, headaches stim trial. MRI brain wnl. Prior notes reviewed, as above, was seeing Dr. Laguerre with Norco 10mg QID prn and Imitrex 100mg #9/month with some relief, also started Amovig 70mg, uses Zofran 4mg prn. No FH of substance abuse. 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 Had 3 cervical ESIs, symptoms essentially resolved after 1st, improved after 2nd, weakness worsening with a lot of lifting at work. Getting out of breath with any exercise, dx with COPD and asthma but nonsmoker, being worked up, had unremarkable 2D echo and Holter monitor, going to see Neuro, off work on FMLA for now, Neuro dx her with seizures with abnormal EEG, increased Topamax used for migraine prophylaxis now also controlling seizures, began Zonisamide also.   Neck Pain   Associated symptoms include headaches. Pertinent negatives include no chest pain, fever, numbness, trouble swallowing or weakness.   Headache       The following portions of the patient's history were reviewed and updated as appropriate: allergies, current medications, past family history, past medical history, past social history, past surgical history and problem list.    Review of Systems   Constitutional:  Negative for chills, fatigue and fever.   HENT:  Positive for hearing loss. Negative for trouble swallowing.    Eyes:  Positive for visual disturbance.   Respiratory:  Negative for shortness of breath.    Cardiovascular:  Negative for chest pain.   Gastrointestinal:  Positive for constipation. Negative for abdominal pain, diarrhea, nausea and vomiting.    Genitourinary:  Negative for urinary incontinence.   Musculoskeletal:  Positive for neck pain. Negative for arthralgias, back pain, joint swelling and myalgias.   Neurological:  Positive for dizziness and headache. Negative for weakness and numbness.       Objective   Physical Exam   Constitutional: She is oriented to person, place, and time. She appears well-developed and well-nourished.   HENT:   Head: Normocephalic and atraumatic.   Eyes: Pupils are equal, round, and reactive to light.   Cardiovascular: Normal rate and regular rhythm.   Murmur heard.  Pulmonary/Chest: Breath sounds normal.   Abdominal: Soft. Bowel sounds are normal. She exhibits no distension. There is no abdominal tenderness.   Neurological: She is alert and oriented to person, place, and time. She has normal reflexes. She displays normal reflexes. No sensory deficit.   Psychiatric: Her behavior is normal. Thought content normal.         Assessment & Plan   Diagnoses and all orders for this visit:    1. Cervicalgia (Primary)    2. Cervical spondylosis without myelopathy    3. Cervical radiculopathy    4. Intractable chronic migraine without aura and without status migrainosus    5. Neuroforaminal stenosis of cervical spine    6. Pain in joint involving multiple sites        UDS in order 8/1/24. Inspect reviewed, in order.  Treatment plan will consist of continuing current medication as long as it remains effective and is necessary, while evaluating patient at each visit and determining if the medication can be lowered or discontinued, while also using nonopioid therapies to reduce reliance on opioids.  Cont Norco 10mg QID prn, Imitrex 100mg #9. Filled Norco 1/22/25 per new Inspect. Two refills this visit.  Patient's symptoms are still adequately managed by current medication regimen, is doing well at this strength and dosage, therefore I will continue to prescribe unchanged as the most appropriate course of treatment.  Began Zofran 4mg TID  prn.  Had 3 cervical ESIs per MRI with nearly 100% resolution of radicular symptoms. No contrast per allergy. Some discomfort with left paramedian approach, will perform on right in future. Performed repeat with similar relief still.  New MRI C-spine to look for worsening pathology, eval for procedures.  Failed headache procedures. Referred to Dr. Seiple for headaches, seeing milton Pang.  RTC in 1 month to review MRI.                  Physical Exam  Constitutional:       Appearance: She is well-developed and well-nourished.   HENT:      Head: Normocephalic and atraumatic.   Eyes:      Pupils: Pupils are equal, round, and reactive to light.   Cardiovascular:      Rate and Rhythm: Normal rate and regular rhythm.      Heart sounds: Murmur heard.   Pulmonary:      Breath sounds: Normal breath sounds.   Abdominal:      General: Bowel sounds are normal. There is no distension.      Palpations: Abdomen is soft.      Tenderness: There is no abdominal tenderness.   Neurological:      Mental Status: She is alert and oriented to person, place, and time.      Sensory: No sensory deficit.      Deep Tendon Reflexes: Reflexes are normal and symmetric. Reflexes normal.   Psychiatric:         Behavior: Behavior normal.         Thought Content: Thought content normal.

## 2025-02-17 ENCOUNTER — OFFICE VISIT (OUTPATIENT)
Dept: FAMILY MEDICINE CLINIC | Facility: CLINIC | Age: 60
End: 2025-02-17
Payer: COMMERCIAL

## 2025-02-17 VITALS
DIASTOLIC BLOOD PRESSURE: 79 MMHG | BODY MASS INDEX: 20.46 KG/M2 | OXYGEN SATURATION: 97 % | TEMPERATURE: 97.7 F | WEIGHT: 135 LBS | SYSTOLIC BLOOD PRESSURE: 130 MMHG | RESPIRATION RATE: 18 BRPM | HEART RATE: 73 BPM | HEIGHT: 68 IN

## 2025-02-17 DIAGNOSIS — G40.209 PARTIAL SYMPTOMATIC EPILEPSY WITH COMPLEX PARTIAL SEIZURES, NOT INTRACTABLE, WITHOUT STATUS EPILEPTICUS: ICD-10-CM

## 2025-02-17 DIAGNOSIS — E55.9 VITAMIN D DEFICIENCY: ICD-10-CM

## 2025-02-17 DIAGNOSIS — Z12.31 ENCOUNTER FOR SCREENING MAMMOGRAM FOR MALIGNANT NEOPLASM OF BREAST: ICD-10-CM

## 2025-02-17 DIAGNOSIS — E03.9 ACQUIRED HYPOTHYROIDISM: Primary | ICD-10-CM

## 2025-02-17 PROCEDURE — 99214 OFFICE O/P EST MOD 30 MIN: CPT | Performed by: FAMILY MEDICINE

## 2025-02-17 NOTE — PROGRESS NOTES
Subjective   Tasneem Valentino is a 60 y.o. female.   Chief Complaint   Patient presents with    Hypertension    Seizures    Hypothyroidism       History of Present Illness   60 y.o. female with complicated past medical history as outlined in previous notes presents to the office today to follow-up primarily on hypothyroidism.  Transferred care to me from Piper Webb who is no longer in this practice about 3 months ago.  Last labs to follow her thyroid level was 6 months ago.  TSH was 1.32.  Remains on levothyroxine 75 mcg/day.      She has hypertension and actually follows with cardiology regarding that.    She follows with Dr. Epps for chronic pain.    She sees neurology for seizures, chronic headaches.    History of Present Illness  The patient presents for evaluation of influenza, seizures, lupus, chronic kidney disease, and sinus issues.    She is currently 2.5 weeks post-influenza diagnosis, with persistent symptoms of cough and fatigue. She reports that her lungs were clear upon auscultation at home approximately one week ago. She experiences episodes of coughing until she expectorates phlegm, which she attributes to sinus drainage. She anticipates her voice becoming raspy by the afternoon, a symptom she associates with allergies. She has been using three HEPA filters in her home, which she finds beneficial.    She has been seizure-free since September 2024. She has an upcoming appointment with her neurologist next month and a subsequent visit to the [facility] in April 2025. Despite the absence of seizures, she experiences occasional days of discomfort and memory lapses, which she attributes to her medication, Keppra. She plans to discuss these side effects with her neurologist during her April 2025 visit. She expresses a desire to return to work but fears potential harm to others or making significant errors due to her condition. She is currently on long-term disability.    Her  rheumatologist has initiated the process for her to start Benlysta, pending insurance approval. She recently underwent extensive lab work ordered by her rheumatologist.    She has been informed by her nephrologist that she is in stage 2 or 3a of chronic kidney disease, but she is uncertain about the consistency of this diagnosis as she has been told it does not change.    She believes it is time for her routine mammogram. She was supposed to have an MRI of her cervical neck tomorrow morning because she keeps having difficulty with it.    MEDICATIONS  Current: Keppra, levothyroxine      Patient Active Problem List    Diagnosis Date Noted    Enlarged lymph node in neck 04/29/2024    Other fatigue 12/14/2023    Gastritis without bleeding 12/14/2023    Screening, lipid 05/01/2023    Partial symptomatic epilepsy with complex partial seizures, not intractable, without status epilepticus 03/23/2023    Dizziness 02/23/2023    Multiple pulmonary nodules 01/26/2023    Intermittent lightheadedness 01/26/2023    Hoarse voice quality 10/28/2022    Moderate persistent asthma without complication 09/27/2022    SOB (shortness of breath) on exertion 07/08/2022    Cervicalgia 09/01/2020    Cervical spondylosis without myelopathy 09/01/2020    Neuroforaminal stenosis of cervical spine 09/01/2020    Cervical radiculopathy 09/01/2020    Nausea 07/21/2020    Hypertension 06/23/2020    History of Lyme disease 06/23/2020    Kidney disease, chronic, stage II (GFR 60-89 ml/min) 06/23/2020    Nodule of skin of left upper extremity 06/23/2020    Weakness of right upper extremity 06/23/2020    Vitamin D deficiency 06/23/2020    History of renal stone 06/23/2020    Anemia 06/22/2020    Anxiety 06/22/2020    Migraine 06/22/2020    Pain in joint involving multiple sites 11/05/2019    Aortic insufficiency 05/30/2019    Hypothyroidism 07/12/2017    Depressive disorder 07/12/2017    Intractable chronic migraine without aura 02/11/2013     Note Last  Updated: 11/5/2019     Overview:   2015 IMO UPDATE      Lupus (systemic lupus erythematosus) 06/01/2012    Coronary artery spasm 06/01/2010    Chronic daily headache 01/01/2000    Meniere's disease 09/01/1991           Past Surgical History:   Procedure Laterality Date    APPENDECTOMY      BRAIN SURGERY      occipital Neurotomy    CARDIAC CATHETERIZATION      CHOLECYSTECTOMY      ENDOSCOPY N/A 10/24/2023    Procedure: ESOPHAGOGASTRODUODENOSCOPY WITH BIOPSY X1 AREA;  Surgeon: Wesley Davies MD;  Location: [facility] ENDOSCOPY;  Service: Gastroenterology;  Laterality: N/A;  POST: GASTRITIS    OCCIPITAL NEURECTOMY  2010    OVARY SURGERY      SINUS SURGERY       Current Outpatient Medications on File Prior to Visit   Medication Sig    albuterol sulfate  (90 Base) MCG/ACT inhaler INHALE 2 PUFFS EVERY 4 HOURS AS NEEDED FOR WHEEZING    dilTIAZem CD (CARDIZEM CD) 240 MG 24 hr capsule Take 1 capsule by mouth Daily.    Emgality 120 MG/ML auto-injector pen ADMINISTER 1 ML UNDER THE SKIN 1 TIME A MONTH    HYDROcodone-acetaminophen (Norco)  MG per tablet Take 1 tablet by mouth Every 6 (Six) Hours As Needed for Moderate Pain.    HYDROcodone-acetaminophen (Norco)  MG per tablet Take 1 tablet by mouth Every 6 (Six) Hours As Needed for Moderate Pain.    hydroxychloroquine (PLAQUENIL) 200 MG tablet Take 1 tablet by mouth 2 (Two) Times a Day.    Keppra 500 MG tablet Take  by mouth. 250 am and 500 pm    levothyroxine (SYNTHROID, LEVOTHROID) 75 MCG tablet TAKE 1 TABLET BY MOUTH DAILY    Linzess 145 MCG capsule capsule Take 1 capsule by mouth Every Morning.    meclizine (ANTIVERT) 25 MG tablet Take 1 tablet by mouth 3 (Three) Times a Day As Needed.    naloxone (NARCAN) 4 MG/0.1ML nasal spray Call 911. Don't prime. [place] in 1 nostril for overdose. Repeat in 2-3 minutes in other nostril if no or minimal breathing/responsiveness.    nitroglycerin (NITROSTAT) 0.4 MG SL tablet Place 1 tablet under the tongue Every 5  (Five) Minutes As Needed for Chest Pain. Take no more than 3 doses in 15 minutes.    ondansetron (ZOFRAN) 4 MG tablet Take 1 tablet by mouth Every 6 (Six) Hours As Needed for Nausea or Vomiting.    predniSONE (DELTASONE) 5 MG tablet Take 1 tablet by mouth Daily.    promethazine (PHENERGAN) 25 MG tablet Take 0.5-1 tablets by mouth Every 8 (Eight) Hours As Needed for Nausea or Vomiting.    SUMAtriptan (IMITREX) 100 MG tablet Take 1 tablet by mouth 1 (One) Time As Needed for Migraine. Take one tablet at onset of headache. May repeat dose one time in 2 hours if headache not relieved.    HYDROcodone-acetaminophen (Norco)  MG per tablet Take 1 tablet by mouth Every 6 (Six) Hours As Needed for Moderate Pain.    HYDROcodone-Acetaminophen (XODOL )  MG per tablet Take 1 tablet by mouth Every 6 (Six) Hours As Needed for Moderate Pain.     No current facility-administered medications on file prior to visit.     Allergies   Allergen Reactions    Contrast Dye (Echo Or Unknown Ct/Mr) Itching and Shortness Of Breath    Sulfa Antibiotics Swelling    Tizanidine Mental Status Change and Hallucinations    Iodinated Contrast Media Itching    Breztri Aerosphere [Budeson-Glycopyrrol-Formoterol] Photosensitivity     Double vision    Lacosamide GI Intolerance    Oxcarbazepine Confusion    Topiramate Confusion     Social History     Socioeconomic History    Marital status:    Tobacco Use    Smoking status: Never     Passive exposure: Never    Smokeless tobacco: Never   Vaping Use    Vaping status: Never Used   Substance and Sexual Activity    Alcohol use: No    Drug use: Never    Sexual activity: Yes     Partners: Male     Family History   Problem Relation Age of Onset    Hypertension Mother     Cancer Father     Arthritis Sister     Heart disease Brother     No Known Problems Brother     No Known Problems Brother        Review of Systems    Objective   /79 (BP Location: Right arm, Patient Position: Sitting,  "Cuff Size: Adult)   Pulse 73   Temp 97.7 °F (36.5 °C) (Infrared)   Resp 18   Ht 172.7 cm (67.99\")   Wt 61.2 kg (135 lb)   LMP  (LMP Unknown)   SpO2 97%   Breastfeeding No   BMI 20.53 kg/m²   Physical Exam  Constitutional:       Appearance: She is well-developed.      Comments:      HENT:      Head: Normocephalic and atraumatic.   Eyes:      Conjunctiva/sclera: Conjunctivae normal.   Cardiovascular:      Rate and Rhythm: Normal rate.   Pulmonary:      Effort: Pulmonary effort is normal.   Musculoskeletal:         General: Normal range of motion.      Cervical back: Normal range of motion.   Skin:     General: Skin is warm and dry.      Findings: No rash.   Neurological:      Mental Status: She is alert and oriented to person, place, and time.   Psychiatric:         Behavior: Behavior normal.       Physical Exam  Lungs are clear. No rhonchi or wheezes heard on forced expiration.      Office Visit on 11/11/2024   Component Date Value Ref Range Status    SARS Antigen 11/11/2024 Not Detected  Not Detected, Presumptive Negative Final    Influenza A Antigen DANY 11/11/2024 Not Detected  Not Detected Final    Influenza B Antigen DANY 11/11/2024 Not Detected  Not Detected Final    Internal Control 11/11/2024 Passed  Passed Final    Lot Number 11/11/2024 [id number]   Final    Expiration Date 11/11/2024 10/23/25   Final     Results  Laboratory Studies  Hemoglobin is normal. Platelet count is 214,000. Glucose is 95. BUN is 23. Creatinine is 1.03. eGFR is 63. Liver tests are normal. WALTER's are positive.    BMI is within normal parameters. No other follow-up for BMI required.     Assessment & Plan   Diagnoses and all orders for this visit:    1. Acquired hypothyroidism (Primary)  -     T4, Free  -     T3, Free  -     TSH    2. Vitamin D deficiency  -     Vitamin D,25-Hydroxy    3. Encounter for screening mammogram for malignant neoplasm of breast  -     Mammo Screening Digital Tomosynthesis Bilateral With CAD; " Future      Assessment & Plan  1. Influenza.  She is currently 2.5 weeks post-influenza diagnosis, with persistent symptoms of cough and fatigue. It was explained that the recovery period from influenza typically spans 4 to 6 weeks. She was advised to utilize a HEPA filter in her bedroom during the night to alleviate her sinus issues.    2. Seizures.  She has not experienced any seizures since September 2024. She is currently on Keppra, which has been effective in controlling her seizures but causes memory issues. She will discuss potential adjustments to her medication regimen with her neurologist in April 2025.    3. Lupus.  She is awaiting insurance approval for Benlysta, which may be administered as either an injection or infusion, depending on insurance requirements. Recent lab work was reviewed, showing normal hemoglobin and platelet counts.    4. Chronic kidney disease.  Her eGFR is 63, indicating stage II chronic kidney disease. She was reassured that this is considered normal and that fluctuations in lab results are common.  Keep follow-up with her nephrologist per his recommendations.    5. Health maintenance.  Her last mammogram was conducted on 03/12/2024. A mammogram will be scheduled for her.    Hypothyroidism-will adjust dose of levothyroxine according to lab results.  A thyroid function test will be ordered today, with a plan to monitor it biannually.           Call with any problems or concerns before next visit       Return in about 6 months (around 8/17/2025).  Patient or patient representative verbalized consent for the use of Ambient Listening during the visit with  Elena Moore MD for chart documentation. 2/17/2025  16:49 EST    Part of this note may be an electronic transcription/translation of spoken language to printed text using the Dragon Dictation System    Elena Moore MD2/17/202516:48 EST  This note has been electronically signed

## 2025-02-18 LAB
25(OH)D3+25(OH)D2 SERPL-MCNC: 31.4 NG/ML (ref 30–100)
T3FREE SERPL-MCNC: 2.7 PG/ML (ref 2–4.4)
T4 FREE SERPL-MCNC: 1.77 NG/DL (ref 0.82–1.77)
TSH SERPL DL<=0.005 MIU/L-ACNC: 0.31 UIU/ML (ref 0.45–4.5)

## 2025-02-18 RX ORDER — LEVOTHYROXINE SODIUM 75 UG/1
75 TABLET ORAL DAILY
Qty: 90 TABLET | Refills: 1 | Status: SHIPPED | OUTPATIENT
Start: 2025-02-18

## 2025-02-27 ENCOUNTER — TELEPHONE (OUTPATIENT)
Dept: PAIN MEDICINE | Facility: CLINIC | Age: 60
End: 2025-02-27
Payer: COMMERCIAL

## 2025-02-27 ENCOUNTER — OFFICE VISIT (OUTPATIENT)
Dept: PAIN MEDICINE | Facility: CLINIC | Age: 60
End: 2025-02-27
Payer: COMMERCIAL

## 2025-02-27 VITALS
OXYGEN SATURATION: 99 % | DIASTOLIC BLOOD PRESSURE: 86 MMHG | RESPIRATION RATE: 16 BRPM | BODY MASS INDEX: 20.53 KG/M2 | SYSTOLIC BLOOD PRESSURE: 125 MMHG | HEART RATE: 70 BPM | WEIGHT: 135 LBS

## 2025-02-27 DIAGNOSIS — M47.812 CERVICAL SPONDYLOSIS WITHOUT MYELOPATHY: ICD-10-CM

## 2025-02-27 DIAGNOSIS — M79.10 MYALGIA: ICD-10-CM

## 2025-02-27 DIAGNOSIS — M25.50 PAIN IN JOINT INVOLVING MULTIPLE SITES: ICD-10-CM

## 2025-02-27 DIAGNOSIS — G43.719 INTRACTABLE CHRONIC MIGRAINE WITHOUT AURA AND WITHOUT STATUS MIGRAINOSUS: ICD-10-CM

## 2025-02-27 DIAGNOSIS — M54.12 CERVICAL RADICULOPATHY: ICD-10-CM

## 2025-02-27 DIAGNOSIS — M48.02 NEUROFORAMINAL STENOSIS OF CERVICAL SPINE: ICD-10-CM

## 2025-02-27 DIAGNOSIS — M54.2 CERVICALGIA: Primary | ICD-10-CM

## 2025-02-27 RX ORDER — HYDROCODONE BITARTRATE AND ACETAMINOPHEN 10; 325 MG/1; MG/1
1 TABLET ORAL EVERY 6 HOURS PRN
Qty: 120 TABLET | Refills: 0 | Status: SHIPPED | OUTPATIENT
Start: 2025-02-27

## 2025-02-27 RX ORDER — HYDROCODONE BITARTRATE AND ACETAMINOPHEN 10; 300 MG/1; MG/1
1 TABLET ORAL EVERY 6 HOURS PRN
Qty: 120 TABLET | Refills: 0 | Status: SHIPPED | OUTPATIENT
Start: 2025-02-27

## 2025-02-27 NOTE — PROGRESS NOTES
Subjective   Tasneem Valentino is a 60 y.o. female.     History of Present Illness  Chronic daily headaches, also widespread joint pain with SLE, 10/10 at worst, 1/10 at best, always present, varies, aching, stabbing, worse with weather changes, interferes with ADLs, sleep, failed chiropractor, PT, massage, occipital neurotomy, headaches stim trial. MRI brain wnl. Prior notes reviewed, as above, was seeing Dr. Laguerre with Norco 10mg QID prn and Imitrex 100mg #9/month with some relief, also started Amovig 70mg, uses Zofran 4mg prn. No FH of substance abuse. 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 Had 3 cervical ESIs, symptoms essentially resolved after 1st, improved after 2nd, weakness worsening with a lot of lifting at work. Getting out of breath with any exercise, dx with COPD and asthma but nonsmoker, being worked up, had unremarkable 2D echo and Holter monitor, going to see Neuro, off work on FMLA for now, Neuro dx her with seizures with abnormal EEG, increased Topamax used for migraine prophylaxis now also controlling seizures, began Zonisamide also.   Neck Pain   Associated symptoms include headaches. Pertinent negatives include no chest pain, fever, numbness, trouble swallowing or weakness.   Headache       The following portions of the patient's history were reviewed and updated as appropriate: allergies, current medications, past family history, past medical history, past social history, past surgical history and problem list.    Review of Systems   Constitutional:  Negative for chills, fatigue and fever.   HENT:  Positive for hearing loss. Negative for trouble swallowing.    Eyes:  Positive for visual disturbance.   Respiratory:  Negative for shortness of breath.    Cardiovascular:  Negative for chest pain.   Gastrointestinal:  Positive for constipation. Negative for abdominal pain, diarrhea, nausea and vomiting.    Genitourinary:  Negative for urinary incontinence.   Musculoskeletal:  Positive for neck pain. Negative for arthralgias, back pain, joint swelling and myalgias.   Neurological:  Positive for dizziness and headache. Negative for weakness and numbness.       Objective   Physical Exam   Constitutional: She is oriented to person, place, and time. She appears well-developed and well-nourished.   HENT:   Head: Normocephalic and atraumatic.   Eyes: Pupils are equal, round, and reactive to light.   Cardiovascular: Normal rate and regular rhythm.   Murmur heard.  Pulmonary/Chest: Breath sounds normal.   Abdominal: Soft. Bowel sounds are normal. She exhibits no distension. There is no abdominal tenderness.   Neurological: She is alert and oriented to person, place, and time. She has normal reflexes. She displays normal reflexes. No sensory deficit.   Psychiatric: Her behavior is normal. Thought content normal.         Assessment & Plan   Diagnoses and all orders for this visit:    1. Cervicalgia (Primary)    2. Cervical spondylosis without myelopathy    3. Cervical radiculopathy    4. Intractable chronic migraine without aura and without status migrainosus    5. Neuroforaminal stenosis of cervical spine    6. Pain in joint involving multiple sites        UDS in order 8/1/24. Inspect reviewed, in order.  Treatment plan will consist of continuing current medication as long as it remains effective and is necessary, while evaluating patient at each visit and determining if the medication can be lowered or discontinued, while also using nonopioid therapies to reduce reliance on opioids.  Cont Norco 10mg QID prn, Imitrex 100mg #9. Filled Norco 2/21/25 per new Inspect. Two refills this visit.  Patient's symptoms are still adequately managed by current medication regimen, is doing well at this strength and dosage, therefore I will continue to prescribe unchanged as the most appropriate course of treatment.  Began Zofran 4mg TID  prn.  Had 3 cervical ESIs per MRI with nearly 100% resolution of radicular symptoms. No contrast per allergy. Some discomfort with left paramedian approach, will perform on right in future. Performed repeat with similar relief still.  New MRI C-spine to look for worsening pathology, eval for procedures, similar to last MRI. Schedule TPIs of b/l upper traps and paraspinals.  Failed headache procedures. Referred to Dr. Seiple for headaches, seeing Dr. Santiago, started Keppra.  RTC for TPIs then in 3 months for f/u

## 2025-03-21 ENCOUNTER — PROCEDURE VISIT (OUTPATIENT)
Dept: PAIN MEDICINE | Facility: CLINIC | Age: 60
End: 2025-03-21
Payer: COMMERCIAL

## 2025-03-21 VITALS
RESPIRATION RATE: 16 BRPM | OXYGEN SATURATION: 99 % | SYSTOLIC BLOOD PRESSURE: 138 MMHG | HEART RATE: 64 BPM | DIASTOLIC BLOOD PRESSURE: 80 MMHG

## 2025-03-21 DIAGNOSIS — M54.2 CERVICALGIA: ICD-10-CM

## 2025-03-21 DIAGNOSIS — M79.10 MYALGIA: Primary | ICD-10-CM

## 2025-03-21 RX ORDER — METHYLPREDNISOLONE ACETATE 40 MG/ML
40 INJECTION, SUSPENSION INTRA-ARTICULAR; INTRALESIONAL; INTRAMUSCULAR; SOFT TISSUE ONCE
Status: COMPLETED | OUTPATIENT
Start: 2025-03-21 | End: 2025-03-21

## 2025-03-21 RX ORDER — LIDOCAINE HYDROCHLORIDE 10 MG/ML
10 INJECTION, SOLUTION EPIDURAL; INFILTRATION; INTRACAUDAL; PERINEURAL ONCE
Status: COMPLETED | OUTPATIENT
Start: 2025-03-21 | End: 2025-03-21

## 2025-03-21 RX ADMIN — METHYLPREDNISOLONE ACETATE 40 MG: 40 INJECTION, SUSPENSION INTRA-ARTICULAR; INTRALESIONAL; INTRAMUSCULAR; SOFT TISSUE at 11:27

## 2025-03-21 RX ADMIN — LIDOCAINE HYDROCHLORIDE 10 ML: 10 INJECTION, SOLUTION EPIDURAL; INFILTRATION; INTRACAUDAL; PERINEURAL at 11:26

## 2025-03-21 NOTE — PROGRESS NOTES
Subjective   Tasneem Valentino is a 60 y.o. female.     History of Present Illness  Chronic daily headaches, also widespread joint pain with SLE, 10/10 at worst, 1/10 at best, always present, varies, aching, stabbing, worse with weather changes, interferes with ADLs, sleep, failed chiropractor, PT, massage, occipital neurotomy, headaches stim trial. MRI brain wnl. Prior notes reviewed, as above, was seeing Dr. Laguerre with Norco 10mg QID prn and Imitrex 100mg #9/month with some relief, also started Amovig 70mg, uses Zofran 4mg prn. No FH of substance abuse. 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 Had 3 cervical ESIs, symptoms essentially resolved after 1st, improved after 2nd, weakness worsening with a lot of lifting at work. Getting out of breath with any exercise, dx with COPD and asthma but nonsmoker, being worked up, had unremarkable 2D echo and Holter monitor, going to see Neuro, off work on FMLA for now, Neuro dx her with seizures with abnormal EEG, increased Topamax used for migraine prophylaxis now also controlling seizures, began Zonisamide also.   Neck Pain   Associated symptoms include headaches. Pertinent negatives include no chest pain, fever, numbness, trouble swallowing or weakness.   Headache       The following portions of the patient's history were reviewed and updated as appropriate: allergies, current medications, past family history, past medical history, past social history, past surgical history and problem list.    Review of Systems   Constitutional:  Negative for chills, fatigue and fever.   HENT:  Positive for hearing loss. Negative for trouble swallowing.    Eyes:  Positive for visual disturbance.   Respiratory:  Negative for shortness of breath.    Cardiovascular:  Negative for chest pain.   Gastrointestinal:  Positive for constipation. Negative for abdominal pain, diarrhea, nausea and vomiting.    Genitourinary:  Negative for urinary incontinence.   Musculoskeletal:  Positive for neck pain. Negative for arthralgias, back pain, joint swelling and myalgias.   Neurological:  Positive for dizziness and headache. Negative for weakness and numbness.       Objective   Physical Exam   Constitutional: She is oriented to person, place, and time. She appears well-developed and well-nourished.   HENT:   Head: Normocephalic and atraumatic.   Eyes: Pupils are equal, round, and reactive to light.   Cardiovascular: Normal rate and regular rhythm.   Murmur heard.  Pulmonary/Chest: Breath sounds normal.   Abdominal: Soft. Bowel sounds are normal. She exhibits no distension. There is no abdominal tenderness.   Neurological: She is alert and oriented to person, place, and time. She has normal reflexes. She displays normal reflexes. No sensory deficit.   Psychiatric: Her behavior is normal. Thought content normal.         Assessment & Plan   Diagnoses and all orders for this visit:    1. Myalgia (Primary)    2. Cervicalgia        UDS in order 8/1/24. Inspect reviewed, in order.  Treatment plan will consist of continuing current medication as long as it remains effective and is necessary, while evaluating patient at each visit and determining if the medication can be lowered or discontinued, while also using nonopioid therapies to reduce reliance on opioids.  Cont Norco 10mg QID prn, Imitrex 100mg #9. Filled Norco 2/21/25 per new Inspect. Two refills this visit.  Patient's symptoms are still adequately managed by current medication regimen, is doing well at this strength and dosage, therefore I will continue to prescribe unchanged as the most appropriate course of treatment.  Began Zofran 4mg TID prn.  Had 3 cervical ESIs per MRI with nearly 100% resolution of radicular symptoms. No contrast per allergy. Some discomfort with left paramedian approach, will perform on right in future. Performed repeat with similar relief  "still.  New MRI C-spine to look for worsening pathology, eval for procedures, similar to last MRI. Schedule TPIs of b/l upper traps and paraspinals.  Failed headache procedures. Referred to Dr. Seiple for headaches, seeing milton Pang.  RTC in 3 months for f/u      Trigger Point Injections    PREOPERATIVE DIAGNOSIS: Myofascial pain    POSTOPERATIVE DIAGNOSIS: Myofascial pain    PROCEDURE PERFORMED:   Trigger point injections    The patient understands the risks and benefits of the procedure and wishes to proceed. The patient was seen in the preoperative area. Patient's consent was obtained and updated. Vitals were taken. Patient was then placed in a seated position for the injection. 10 sites marked, 5 left and 5 right in cervical and thoracic paraspinal muscles and trapezius muscles. Each site prepped with alcohol swabs x 4, then injected using a 25g 1.5\" needle using 0.5-1ml of solution of Depomedrol 10mg and Lidocaine 1% 9ml after negative aspiration followed by needling. The patient tolerated with no carolyn-procedural complications.  A sterile dressing was placed over the puncture sites.                   "

## 2025-05-22 ENCOUNTER — OFFICE VISIT (OUTPATIENT)
Dept: PAIN MEDICINE | Facility: CLINIC | Age: 60
End: 2025-05-22
Payer: COMMERCIAL

## 2025-05-22 VITALS
DIASTOLIC BLOOD PRESSURE: 74 MMHG | HEART RATE: 70 BPM | WEIGHT: 141.6 LBS | SYSTOLIC BLOOD PRESSURE: 125 MMHG | RESPIRATION RATE: 16 BRPM | OXYGEN SATURATION: 97 % | BODY MASS INDEX: 21.54 KG/M2

## 2025-05-22 DIAGNOSIS — M79.10 MYALGIA: ICD-10-CM

## 2025-05-22 DIAGNOSIS — M54.12 CERVICAL RADICULOPATHY: ICD-10-CM

## 2025-05-22 DIAGNOSIS — G43.709 CHRONIC MIGRAINE WITHOUT AURA WITHOUT STATUS MIGRAINOSUS, NOT INTRACTABLE: ICD-10-CM

## 2025-05-22 DIAGNOSIS — M25.50 PAIN IN JOINT INVOLVING MULTIPLE SITES: ICD-10-CM

## 2025-05-22 DIAGNOSIS — M54.2 CERVICALGIA: Primary | ICD-10-CM

## 2025-05-22 DIAGNOSIS — M47.812 CERVICAL SPONDYLOSIS WITHOUT MYELOPATHY: ICD-10-CM

## 2025-05-22 RX ORDER — HYDROCODONE BITARTRATE AND ACETAMINOPHEN 10; 325 MG/1; MG/1
1 TABLET ORAL EVERY 6 HOURS PRN
Qty: 120 TABLET | Refills: 0 | Status: SHIPPED | OUTPATIENT
Start: 2025-05-22

## 2025-05-22 RX ORDER — BELIMUMAB 200 MG/ML
SOLUTION SUBCUTANEOUS WEEKLY
COMMUNITY

## 2025-05-22 RX ORDER — SUMATRIPTAN SUCCINATE 100 MG/1
100 TABLET ORAL ONCE AS NEEDED
Qty: 9 TABLET | Refills: 5 | Status: SHIPPED | OUTPATIENT
Start: 2025-05-22

## 2025-05-22 NOTE — PROGRESS NOTES
Subjective   Tasneem Valentino is a 60 y.o. female.     History of Present Illness  Chronic daily headaches, also widespread joint pain with SLE, 10/10 at worst, 1/10 at best, always present, varies, aching, stabbing, worse with weather changes, interferes with ADLs, sleep, failed chiropractor, PT, massage, occipital neurotomy, headaches stim trial. MRI brain wnl. Prior notes reviewed, as above, was seeing Dr. Laguerre with Norco 10mg QID prn and Imitrex 100mg #9/month with some relief, also started Amovig 70mg, uses Zofran 4mg prn. No FH of substance abuse. 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 Had 3 cervical ESIs, symptoms essentially resolved after 1st, improved after 2nd, weakness worsening with a lot of lifting at work. Getting out of breath with any exercise, dx with COPD and asthma but nonsmoker, being worked up, had unremarkable 2D echo and Holter monitor, going to see Neuro, off work on FMLA for now, Neuro dx her with seizures with abnormal EEG, increased Topamax used for migraine prophylaxis now also controlling seizures, began Zonisamide also.   Neck Pain   Associated symptoms include headaches. Pertinent negatives include no chest pain, fever, numbness, trouble swallowing or weakness.   Headache       The following portions of the patient's history were reviewed and updated as appropriate: allergies, current medications, past family history, past medical history, past social history, past surgical history and problem list.    Review of Systems   Constitutional:  Negative for chills, fatigue and fever.   HENT:  Positive for hearing loss. Negative for trouble swallowing.    Eyes:  Positive for visual disturbance.   Respiratory:  Negative for shortness of breath.    Cardiovascular:  Negative for chest pain.   Gastrointestinal:  Positive for constipation. Negative for abdominal pain, diarrhea, nausea and vomiting.    Genitourinary:  Negative for urinary incontinence.   Musculoskeletal:  Positive for neck pain. Negative for arthralgias, back pain, joint swelling and myalgias.   Neurological:  Positive for dizziness and headache. Negative for weakness and numbness.       Objective   Physical Exam   Constitutional: She is oriented to person, place, and time. She appears well-developed and well-nourished.   HENT:   Head: Normocephalic and atraumatic.   Eyes: Pupils are equal, round, and reactive to light.   Cardiovascular: Normal rate and regular rhythm.   Murmur heard.  Pulmonary/Chest: Breath sounds normal.   Abdominal: Soft. Bowel sounds are normal. She exhibits no distension. There is no abdominal tenderness.   Neurological: She is alert and oriented to person, place, and time. She has normal reflexes. She displays normal reflexes. No sensory deficit.   Psychiatric: Her behavior is normal. Thought content normal.         Assessment & Plan   Diagnoses and all orders for this visit:    1. Cervicalgia (Primary)    2. Cervical spondylosis without myelopathy    3. Cervical radiculopathy    4. Chronic migraine without aura without status migrainosus, not intractable    5. Myalgia    6. Pain in joint involving multiple sites        UDS in order 8/1/24. Inspect reviewed, in order.  Treatment plan will consist of continuing current medication as long as it remains effective and is necessary, while evaluating patient at each visit and determining if the medication can be lowered or discontinued, while also using nonopioid therapies to reduce reliance on opioids.  Cont Norco 10mg QID prn, Imitrex 100mg #9. Filled Norco 4/23/25 per new Inspect. Three refills this visit.  Patient's symptoms are still adequately managed by current medication regimen, is doing well at this strength and dosage, therefore I will continue to prescribe unchanged as the most appropriate course of treatment.  Began Zofran 4mg TID prn.  Had 3 cervical ESIs per MRI  with nearly 100% resolution of radicular symptoms. No contrast per allergy. Some discomfort with left paramedian approach, will perform on right in future. Performed repeat with similar relief still.  New MRI C-spine to look for worsening pathology, eval for procedures, similar to last MRI. Schedule TPIs of b/l upper traps and paraspinals.  Failed headache procedures. Referred to Dr. Seiple for headaches, seeing Dr. Santiago, started Keppra.  RTC in 3 months for f/u                      Physical Exam

## 2025-05-24 RX ORDER — LEVOTHYROXINE SODIUM 75 UG/1
75 TABLET ORAL DAILY
Qty: 90 TABLET | Refills: 1 | Status: SHIPPED | OUTPATIENT
Start: 2025-05-24

## 2025-06-13 ENCOUNTER — TELEPHONE (OUTPATIENT)
Dept: FAMILY MEDICINE CLINIC | Facility: CLINIC | Age: 60
End: 2025-06-13
Payer: COMMERCIAL

## 2025-06-13 NOTE — TELEPHONE ENCOUNTER
Received request from [facility] for mammogram diagnostic left and left breast ultrasound orders

## 2025-08-14 ENCOUNTER — OFFICE VISIT (OUTPATIENT)
Dept: PAIN MEDICINE | Facility: CLINIC | Age: 60
End: 2025-08-14
Payer: COMMERCIAL

## 2025-08-14 VITALS
HEART RATE: 61 BPM | BODY MASS INDEX: 21.29 KG/M2 | WEIGHT: 140 LBS | SYSTOLIC BLOOD PRESSURE: 126 MMHG | DIASTOLIC BLOOD PRESSURE: 77 MMHG | OXYGEN SATURATION: 99 % | RESPIRATION RATE: 16 BRPM

## 2025-08-14 DIAGNOSIS — M25.50 PAIN IN JOINT INVOLVING MULTIPLE SITES: ICD-10-CM

## 2025-08-14 DIAGNOSIS — M47.812 CERVICAL SPONDYLOSIS WITHOUT MYELOPATHY: ICD-10-CM

## 2025-08-14 DIAGNOSIS — G43.719 INTRACTABLE CHRONIC MIGRAINE WITHOUT AURA AND WITHOUT STATUS MIGRAINOSUS: ICD-10-CM

## 2025-08-14 DIAGNOSIS — M54.2 CERVICALGIA: Primary | ICD-10-CM

## 2025-08-14 DIAGNOSIS — M79.10 MYALGIA: ICD-10-CM

## 2025-08-14 DIAGNOSIS — M54.12 CERVICAL RADICULOPATHY: ICD-10-CM

## 2025-08-14 DIAGNOSIS — Z79.899 HIGH RISK MEDICATION USE: Primary | ICD-10-CM

## 2025-08-14 RX ORDER — HYDROCODONE BITARTRATE AND ACETAMINOPHEN 10; 325 MG/1; MG/1
1 TABLET ORAL EVERY 6 HOURS PRN
Qty: 120 TABLET | Refills: 0 | Status: SHIPPED | OUTPATIENT
Start: 2025-08-14

## 2025-08-14 RX ORDER — FOLIC ACID 1 MG/1
1 TABLET ORAL DAILY
COMMUNITY
Start: 2025-08-04

## 2025-08-14 RX ORDER — METHOTREXATE 2.5 MG/1
TABLET ORAL
COMMUNITY
Start: 2025-08-05

## (undated) DEVICE — BITEBLOCK ENDO W/STRAP 60F A/ LF DISP

## (undated) DEVICE — SINGLE-USE BIOPSY FORCEPS: Brand: RADIAL JAW 4

## (undated) DEVICE — PK ENDO GI 50